# Patient Record
Sex: MALE | Race: WHITE | NOT HISPANIC OR LATINO | Employment: OTHER | URBAN - METROPOLITAN AREA
[De-identification: names, ages, dates, MRNs, and addresses within clinical notes are randomized per-mention and may not be internally consistent; named-entity substitution may affect disease eponyms.]

---

## 2021-10-26 ENCOUNTER — TRANSCRIBE ORDERS (OUTPATIENT)
Dept: ADMINISTRATIVE | Facility: HOSPITAL | Age: 71
End: 2021-10-26

## 2021-10-26 ENCOUNTER — LAB (OUTPATIENT)
Dept: LAB | Facility: HOSPITAL | Age: 71
End: 2021-10-26

## 2021-10-26 DIAGNOSIS — E87.5 SERUM POTASSIUM ELEVATED: Primary | ICD-10-CM

## 2021-10-26 DIAGNOSIS — E87.5 SERUM POTASSIUM ELEVATED: ICD-10-CM

## 2021-10-26 LAB
ALBUMIN SERPL-MCNC: 4.3 G/DL (ref 3.5–5.2)
ALBUMIN/GLOB SERPL: 1.8 G/DL
ALP SERPL-CCNC: 101 U/L (ref 39–117)
ALT SERPL W P-5'-P-CCNC: 25 U/L (ref 1–41)
ANION GAP SERPL CALCULATED.3IONS-SCNC: 7.1 MMOL/L (ref 5–15)
AST SERPL-CCNC: 22 U/L (ref 1–40)
BILIRUB SERPL-MCNC: 0.3 MG/DL (ref 0–1.2)
BUN SERPL-MCNC: 14 MG/DL (ref 8–23)
BUN/CREAT SERPL: 12.3 (ref 7–25)
CALCIUM SPEC-SCNC: 9.8 MG/DL (ref 8.6–10.5)
CHLORIDE SERPL-SCNC: 102 MMOL/L (ref 98–107)
CO2 SERPL-SCNC: 28.9 MMOL/L (ref 22–29)
CREAT SERPL-MCNC: 1.14 MG/DL (ref 0.76–1.27)
GFR SERPL CREATININE-BSD FRML MDRD: 63 ML/MIN/1.73
GLOBULIN UR ELPH-MCNC: 2.4 GM/DL
GLUCOSE SERPL-MCNC: 80 MG/DL (ref 65–99)
POTASSIUM SERPL-SCNC: 4.7 MMOL/L (ref 3.5–5.2)
PROT SERPL-MCNC: 6.7 G/DL (ref 6–8.5)
SODIUM SERPL-SCNC: 138 MMOL/L (ref 136–145)

## 2021-10-26 PROCEDURE — 36415 COLL VENOUS BLD VENIPUNCTURE: CPT

## 2021-10-26 PROCEDURE — 80053 COMPREHEN METABOLIC PANEL: CPT

## 2022-01-14 ENCOUNTER — TRANSCRIBE ORDERS (OUTPATIENT)
Dept: ADMINISTRATIVE | Facility: HOSPITAL | Age: 72
End: 2022-01-14

## 2022-01-14 DIAGNOSIS — C61 PROSTATE CANCER: Primary | ICD-10-CM

## 2022-02-23 ENCOUNTER — HOSPITAL ENCOUNTER (OUTPATIENT)
Dept: MRI IMAGING | Facility: HOSPITAL | Age: 72
Discharge: HOME | End: 2022-02-23
Admitting: STUDENT IN AN ORGANIZED HEALTH CARE EDUCATION/TRAINING PROGRAM

## 2022-02-23 DIAGNOSIS — C61 PROSTATE CANCER: ICD-10-CM

## 2022-02-23 LAB — CREAT BLDA-MCNC: 1.2 MG/DL (ref 0.6–1.3)

## 2022-02-23 PROCEDURE — 82565 ASSAY OF CREATININE: CPT

## 2022-02-23 PROCEDURE — 0 GADOBENATE DIMEGLUMINE 529 MG/ML SOLUTION: Performed by: STUDENT IN AN ORGANIZED HEALTH CARE EDUCATION/TRAINING PROGRAM

## 2022-02-23 PROCEDURE — A9577 INJ MULTIHANCE: HCPCS | Performed by: STUDENT IN AN ORGANIZED HEALTH CARE EDUCATION/TRAINING PROGRAM

## 2022-02-23 PROCEDURE — 72197 MRI PELVIS W/O & W/DYE: CPT

## 2022-02-23 RX ADMIN — GADOBENATE DIMEGLUMINE 16 ML: 529 INJECTION, SOLUTION INTRAVENOUS at 07:11

## 2023-05-09 ENCOUNTER — TRANSCRIBE ORDERS (OUTPATIENT)
Dept: ADMINISTRATIVE | Facility: HOSPITAL | Age: 73
End: 2023-05-09
Payer: MEDICARE

## 2023-05-09 DIAGNOSIS — R74.8 ELEVATED LIVER ENZYMES: Primary | ICD-10-CM

## 2023-05-11 ENCOUNTER — HOSPITAL ENCOUNTER (OUTPATIENT)
Dept: ULTRASOUND IMAGING | Facility: HOSPITAL | Age: 73
Discharge: HOME | End: 2023-05-11
Admitting: NURSE PRACTITIONER
Payer: MEDICARE

## 2023-05-11 DIAGNOSIS — R74.8 ELEVATED LIVER ENZYMES: ICD-10-CM

## 2023-05-11 PROCEDURE — 76705 ECHO EXAM OF ABDOMEN: CPT

## 2023-05-12 ENCOUNTER — TRANSCRIBE ORDERS (OUTPATIENT)
Dept: ADMINISTRATIVE | Facility: HOSPITAL | Age: 73
End: 2023-05-12
Payer: MEDICARE

## 2023-05-12 ENCOUNTER — HOSPITAL ENCOUNTER (OUTPATIENT)
Dept: CT IMAGING | Facility: HOSPITAL | Age: 73
Discharge: HOME | End: 2023-05-12
Payer: MEDICARE

## 2023-05-12 DIAGNOSIS — Z80.42 FAMILY HX OF PROSTATE CANCER: ICD-10-CM

## 2023-05-12 DIAGNOSIS — K76.9 LIVER LESION: Primary | ICD-10-CM

## 2023-05-12 DIAGNOSIS — R93.5 ABNORMAL ULTRASOUND OF ABDOMEN: ICD-10-CM

## 2023-05-12 DIAGNOSIS — K76.9 LIVER LESION: ICD-10-CM

## 2023-05-12 PROCEDURE — 74176 CT ABD & PELVIS W/O CONTRAST: CPT

## 2023-05-12 PROCEDURE — 71250 CT THORAX DX C-: CPT

## 2023-05-15 ENCOUNTER — LAB (OUTPATIENT)
Dept: LAB | Facility: HOSPITAL | Age: 73
End: 2023-05-15
Payer: MEDICARE

## 2023-05-15 ENCOUNTER — TELEPHONE (OUTPATIENT)
Dept: ONCOLOGY | Facility: CLINIC | Age: 73
End: 2023-05-15

## 2023-05-15 ENCOUNTER — CONSULT (OUTPATIENT)
Dept: ONCOLOGY | Facility: CLINIC | Age: 73
End: 2023-05-15
Payer: MEDICARE

## 2023-05-15 VITALS
SYSTOLIC BLOOD PRESSURE: 103 MMHG | WEIGHT: 147.6 LBS | OXYGEN SATURATION: 98 % | HEIGHT: 72 IN | TEMPERATURE: 98.1 F | BODY MASS INDEX: 19.99 KG/M2 | DIASTOLIC BLOOD PRESSURE: 69 MMHG | HEART RATE: 92 BPM

## 2023-05-15 DIAGNOSIS — Z86.002 PERSONAL HISTORY OF IN-SITU NEOPLASM OF OTHER AND UNSPECIFIED GENITAL ORGANS: ICD-10-CM

## 2023-05-15 DIAGNOSIS — C79.9 METASTATIC MALIGNANT NEOPLASM, UNSPECIFIED SITE: ICD-10-CM

## 2023-05-15 DIAGNOSIS — C61 PROSTATE CANCER: ICD-10-CM

## 2023-05-15 DIAGNOSIS — K76.9 LIVER LESION: Primary | ICD-10-CM

## 2023-05-15 DIAGNOSIS — G89.3 PAIN OF METASTATIC MALIGNANCY: ICD-10-CM

## 2023-05-15 LAB
ALBUMIN SERPL-MCNC: 3.8 G/DL (ref 3.5–5.2)
ALBUMIN/GLOB SERPL: 1.4 G/DL
ALP SERPL-CCNC: 275 U/L (ref 39–117)
ALT SERPL W P-5'-P-CCNC: 33 U/L (ref 1–41)
ANION GAP SERPL CALCULATED.3IONS-SCNC: 10 MMOL/L (ref 5–15)
APTT PPP: 32 SECONDS (ref 24–31)
AST SERPL-CCNC: 27 U/L (ref 1–40)
BASOPHILS # BLD AUTO: 0.05 10*3/MM3 (ref 0–0.2)
BASOPHILS NFR BLD AUTO: 0.5 % (ref 0–1.5)
BILIRUB SERPL-MCNC: 0.5 MG/DL (ref 0–1.2)
BUN SERPL-MCNC: 20 MG/DL (ref 8–23)
BUN/CREAT SERPL: 16.9 (ref 7–25)
CALCIUM SPEC-SCNC: 10 MG/DL (ref 8.6–10.5)
CHLORIDE SERPL-SCNC: 91 MMOL/L (ref 98–107)
CO2 SERPL-SCNC: 28 MMOL/L (ref 22–29)
CREAT SERPL-MCNC: 1.18 MG/DL (ref 0.76–1.27)
DEPRECATED RDW RBC AUTO: 40.7 FL (ref 37–54)
EGFRCR SERPLBLD CKD-EPI 2021: 65.6 ML/MIN/1.73
EOSINOPHIL # BLD AUTO: 0.15 10*3/MM3 (ref 0–0.4)
EOSINOPHIL NFR BLD AUTO: 1.5 % (ref 0.3–6.2)
ERYTHROCYTE [DISTWIDTH] IN BLOOD BY AUTOMATED COUNT: 13.1 % (ref 12.3–15.4)
GLOBULIN UR ELPH-MCNC: 2.8 GM/DL
GLUCOSE SERPL-MCNC: 102 MG/DL (ref 65–99)
HCT VFR BLD AUTO: 38.9 % (ref 37.5–51)
HGB BLD-MCNC: 13.1 G/DL (ref 13–17.7)
INR PPP: 1.01 (ref 0.93–1.1)
LYMPHOCYTES # BLD AUTO: 1.03 10*3/MM3 (ref 0.7–3.1)
LYMPHOCYTES NFR BLD AUTO: 10.4 % (ref 19.6–45.3)
MCH RBC QN AUTO: 29.4 PG (ref 26.6–33)
MCHC RBC AUTO-ENTMCNC: 33.7 G/DL (ref 31.5–35.7)
MCV RBC AUTO: 87.2 FL (ref 79–97)
MONOCYTES # BLD AUTO: 1.26 10*3/MM3 (ref 0.1–0.9)
MONOCYTES NFR BLD AUTO: 12.7 % (ref 5–12)
NEUTROPHILS NFR BLD AUTO: 7.46 10*3/MM3 (ref 1.7–7)
NEUTROPHILS NFR BLD AUTO: 74.9 % (ref 42.7–76)
PLATELET # BLD AUTO: 320 10*3/MM3 (ref 140–450)
PMV BLD AUTO: 9.8 FL (ref 6–12)
POTASSIUM SERPL-SCNC: 4.8 MMOL/L (ref 3.5–5.2)
PROT SERPL-MCNC: 6.6 G/DL (ref 6–8.5)
PROTHROMBIN TIME: 10.8 SECONDS (ref 9.6–11.7)
RBC # BLD AUTO: 4.46 10*6/MM3 (ref 4.14–5.8)
SODIUM SERPL-SCNC: 129 MMOL/L (ref 136–145)
WBC NRBC COR # BLD: 9.95 10*3/MM3 (ref 3.4–10.8)

## 2023-05-15 PROCEDURE — 85025 COMPLETE CBC W/AUTO DIFF WBC: CPT

## 2023-05-15 PROCEDURE — 85610 PROTHROMBIN TIME: CPT | Performed by: INTERNAL MEDICINE

## 2023-05-15 PROCEDURE — 36415 COLL VENOUS BLD VENIPUNCTURE: CPT | Performed by: INTERNAL MEDICINE

## 2023-05-15 PROCEDURE — 84153 ASSAY OF PSA TOTAL: CPT | Performed by: INTERNAL MEDICINE

## 2023-05-15 PROCEDURE — 80053 COMPREHEN METABOLIC PANEL: CPT | Performed by: INTERNAL MEDICINE

## 2023-05-15 PROCEDURE — 82378 CARCINOEMBRYONIC ANTIGEN: CPT | Performed by: INTERNAL MEDICINE

## 2023-05-15 PROCEDURE — 85730 THROMBOPLASTIN TIME PARTIAL: CPT | Performed by: INTERNAL MEDICINE

## 2023-05-15 RX ORDER — METOPROLOL TARTRATE 50 MG/1
50 TABLET, FILM COATED ORAL 2 TIMES DAILY WITH MEALS
Status: ON HOLD | COMMUNITY
Start: 2023-03-21

## 2023-05-15 RX ORDER — OXYCODONE HYDROCHLORIDE 5 MG/1
5 TABLET ORAL EVERY 6 HOURS PRN
Qty: 120 TABLET | Refills: 0 | Status: ON HOLD | OUTPATIENT
Start: 2023-05-15

## 2023-05-15 RX ORDER — LISINOPRIL 40 MG/1
1 TABLET ORAL DAILY
Status: ON HOLD | COMMUNITY
Start: 2023-01-30

## 2023-05-15 RX ORDER — HYDROCODONE BITARTRATE AND ACETAMINOPHEN 5; 325 MG/1; MG/1
1 TABLET ORAL EVERY 6 HOURS PRN
COMMUNITY
End: 2023-05-22

## 2023-05-15 RX ORDER — MIRTAZAPINE 7.5 MG/1
7.5 TABLET, FILM COATED ORAL NIGHTLY
Status: ON HOLD | COMMUNITY
Start: 2023-05-08

## 2023-05-15 NOTE — TELEPHONE ENCOUNTER
Caller: LEIDY    Relationship: Other - [facility]    Best call back number: [phone]    What is the best time to reach you: ANYTIME    Who are you requesting to speak with (clinical staff, provider, specific staff member): CLINICAL TEAM    What was the call regarding: LEIDY STATED HE RECVD THE PRESCRIPTION FOR PTS OXYCODONE BUT PT JUST PICKED UP A SCRIPT FOR HYDROCODONE 5-325 (60 TABLETS) - PLEASE CALL ASAP TO ADVISE IF THE OXYCODONE SHOULD BE DISPENSED ON TOP OF THE HYDROCODONE OR NOT.     Do you require a callback: YES

## 2023-05-15 NOTE — TELEPHONE ENCOUNTER
PER DR. MOORE, HE WANTS THE OXYCODONE DISPENSED AS HE IS ELIMINATING THE ACETAMINOPHEN.  GAVE APPROVAL TO PHARMACIST BALTAZAR AT [facility].  SHE WILL ALSO EDUCATE PATIENT TO STOP TAKING THE HYDROCODONE, ONLY USE THE OXYCODONE.

## 2023-05-15 NOTE — PROGRESS NOTES
HEMATOLOGY ONCOLOGY OUTPATIENT CONSULTATION       Patient name: Cali Cavazos  : 1950  MRN: [id number]  Primary Care Physician: William Crawford MD  Referring Physician: No ref. provider found  Reason For Consult: Metastatic malignancy. History of prostate cancer.     History of Present Illness:  Patient is a 72 y.o.     5/15/2023: Mr. Cavazos was referred for the investigation and treatment of what appeared to be a metastatic malignancy. His symptoms had started at the end of  or the beginning of . He first noted a loss of appetite. More important, however, he had dysgeusia. This led slowly to a significant reduction in the amount he ate and consequently he had a drop in his weight. Slowly he developed low back pain that became progressively more intense. His dysgeusia became more severe. He had imaging of the abdomen that reported findings consistent with metastatic disease involving lymph nodes in the ben hepatis, mid abdominal mesentery and the mid abdominal retroperitoneum. Some suggestion of peritoneal carcinomatosis was also present. But more important, there were several nodules in the liver, the largest of which was about 1.8 cm. No obvious source was present, though a non-specific lesion measuring 1.5 cm was present in the mid left kidney. Also a change in caliber of the colon near the level of the rectum. The exam revealed him to be a slender man in no distress. No jaundice and not ill or in distress. No jaundice. No oral lesions and no palpable adenopathy. Lungs diminished. The liver appeared enlarged to palpation of the abdomen; at the the level of the mid clavicular line it seemed to be approximately 5 cm below the costal margin and it was hard and nodular. Family history was not contributory to the present illness. A decision was made to obtain biopsy of the liver. Several laboratory exams were requested. To see me in 2 weeks.     Subjective:  5/15/2023:  In the office for the first time with the above.     Past Medical History:   Diagnosis Date   • Hypertension    • Prostate cancer      Past Surgical History:   Procedure Laterality Date   • BACK SURGERY      two back surgeries    • COLONOSCOPY         Current Outpatient Medications:   •  HYDROcodone-acetaminophen (NORCO) 5-325 MG per tablet, Take 1 tablet by mouth Every 6 (Six) Hours As Needed., Disp: , Rfl:   •  lisinopril (PRINIVIL,ZESTRIL) 40 MG tablet, Take 1 tablet by mouth Daily., Disp: , Rfl:   •  metoprolol tartrate (LOPRESSOR) 50 MG tablet, Take 1 tablet by mouth 2 (Two) Times a Day With Meals., Disp: , Rfl:   •  mirtazapine (REMERON) 7.5 MG tablet, , Disp: , Rfl:   •  oxyCODONE (Roxicodone) 5 MG immediate release tablet, Take 1 tablet by mouth Every 6 (Six) Hours As Needed for Moderate Pain., Disp: 120 tablet, Rfl: 0    Allergies   Allergen Reactions   • Penicillins Hives     Family History   Problem Relation Age of Onset   • Stroke Mother    • Stomach cancer Father 74   • Throat cancer Sister    • Cancer Brother         Unclear origin. Bladder?   • Prostate cancer Brother 65     Cancer-related family history includes Cancer in his brother; Prostate cancer (age of onset: 65) in his brother; Stomach cancer (age of onset: 74) in his father; Throat cancer in his sister.    Social History     Tobacco Use   • Smoking status: Former     Packs/day: 1.00     Years: 50.00     Pack years: 50.00     Types: Cigarettes     Start date:      Quit date: 2022     Years since quittin.0   • Smokeless tobacco: Never   Vaping Use   • Vaping Use: Never used   Substance Use Topics   • Alcohol use: Not Currently   • Drug use: Never     Social History     Social History Narrative   • Not on file     ROS:   Review of Systems   Constitutional: Positive for activity change, appetite change, fatigue and unexpected weight change. Negative for chills, diaphoresis and fever.   HENT: Negative for congestion, dental  "problem, drooling, ear discharge, ear pain, facial swelling, hearing loss, mouth sores, nosebleeds, postnasal drip, rhinorrhea, sinus pressure, sinus pain, sneezing, sore throat, tinnitus, trouble swallowing and voice change.    Eyes: Negative for photophobia, pain, discharge, redness, itching and visual disturbance.   Respiratory: Negative for apnea, cough, choking, chest tightness, shortness of breath, wheezing and stridor.    Cardiovascular: Negative for chest pain, palpitations and leg swelling.   Gastrointestinal: Negative for abdominal distention, abdominal pain, anal bleeding, blood in stool, constipation, diarrhea, nausea, rectal pain and vomiting.        Persistent dysgeusia.    Endocrine: Negative for cold intolerance, heat intolerance, polydipsia and polyuria.   Genitourinary: Negative for decreased urine volume, difficulty urinating, dysuria, flank pain, frequency, genital sores, hematuria and urgency.   Musculoskeletal: Positive for back pain. Negative for arthralgias, gait problem, joint swelling, myalgias, neck pain and neck stiffness.   Skin: Negative for color change, pallor and rash.   Neurological: Negative for dizziness, tremors, seizures, syncope, facial asymmetry, speech difficulty, weakness, light-headedness, numbness and headaches.   Hematological: Negative for adenopathy. Does not bruise/bleed easily.   Psychiatric/Behavioral: Negative for agitation, behavioral problems, confusion, decreased concentration, hallucinations, self-injury, sleep disturbance and suicidal ideas. The patient is not nervous/anxious.        Objective:    Vital Signs:  Vitals:    05/15/23 1448   BP: 103/69   Pulse: 92   Temp: 98.1 °F (36.7 °C)   TempSrc: Oral   SpO2: 98%   Weight: 67 kg (147 lb 9.6 oz)   Height: 182.9 cm (72\")   PainSc: 0-No pain     Body mass index is 20.02 kg/m².    ECOG  (1) Restricted in physically strenuous activity, ambulatory and able to do work of light nature    Physical Exam:   Physical " Exam  Constitutional:       General: He is not in acute distress.     Appearance: He is not ill-appearing, toxic-appearing or diaphoretic.      Comments: Slender man. Appears ill. Conversant and in good spirits. No distress.    HENT:      Head: Normocephalic and atraumatic.      Right Ear: External ear normal.      Left Ear: External ear normal.      Nose: Nose normal.      Mouth/Throat:      Mouth: Mucous membranes are moist.      Pharynx: Oropharynx is clear.   Eyes:      General: No scleral icterus.        Right eye: No discharge.         Left eye: No discharge.      Conjunctiva/sclera: Conjunctivae normal.      Pupils: Pupils are equal, round, and reactive to light.   Cardiovascular:      Rate and Rhythm: Normal rate and regular rhythm.      Pulses: Normal pulses.      Heart sounds: Normal heart sounds. No murmur heard.    No friction rub. No gallop.   Pulmonary:      Effort: No respiratory distress.      Breath sounds: No stridor. No wheezing, rhonchi or rales.   Chest:      Chest wall: No tenderness.   Abdominal:      General: Abdomen is flat. Bowel sounds are normal. There is no distension.      Palpations: Abdomen is soft. There is no mass.      Tenderness: There is no abdominal tenderness. There is no right CVA tenderness, left CVA tenderness, guarding or rebound.   Musculoskeletal:         General: No swelling, tenderness, deformity or signs of injury.      Cervical back: No rigidity.      Right lower leg: No edema.      Left lower leg: No edema.   Lymphadenopathy:      Cervical: No cervical adenopathy.   Skin:     General: Skin is warm and dry.      Coloration: Skin is not jaundiced.      Findings: No bruising or rash.   Neurological:      General: No focal deficit present.      Mental Status: He is alert and oriented to person, place, and time.      Gait: Gait normal.   Psychiatric:         Mood and Affect: Mood normal.         Behavior: Behavior normal.         Thought Content: Thought content normal.          Judgment: Judgment normal.     I Jonathan Jacob MD performed the physical exam on 5/15/2023 as documented above.     Lab Results - Last 18 Months   Lab Units 05/15/23  1437   WBC 10*3/mm3 9.95   HEMOGLOBIN g/dL 13.1   HEMATOCRIT % 38.9   PLATELETS 10*3/mm3 320   MCV fL 87.2     Lab Results - Last 18 Months   Lab Units 02/23/22  0637   CREATININE mg/dL 1.20     Lab Results   Component Value Date    GLUCOSE 80 10/26/2021    BUN 14 10/26/2021    CREATININE 1.20 02/23/2022    EGFRIFNONA 63 10/26/2021    BCR 12.3 10/26/2021    K 4.7 10/26/2021    CO2 28.9 10/26/2021    CALCIUM 9.8 10/26/2021    ALBUMIN 4.30 10/26/2021    AST 22 10/26/2021    ALT 25 10/26/2021     Assessment & Plan     1. Apparent metastatic malignancy. I independently reviewed the images of the scans of the chest, abdomen and pelvis. Reviewed the reports and all the records available. Discussed with him and his family. I strongly suspect metastatic malignancy. It is unlikely to be of prostate origin, both because of the pattern of disease but also because, reportedly, when the symptoms began, his PSA was very low. If indeed he has a malignancy, it would very likely be of gastrointestinal symptoms. Discussed with him and his family the findings and my impression. I have requested a biopsy of one of the liver lesions. I have also requested laboratory exams. Discussed with him the plans.   2. Reviewed all records available.   3. He is to return in 2 weeks with new results.     Jonathan Jacob MD on 5/15/2023 at 16:47

## 2023-05-16 LAB
CEA SERPL-MCNC: 25.1 NG/ML
PSA SERPL-MCNC: 0.39 NG/ML (ref 0–4)

## 2023-05-17 ENCOUNTER — TELEPHONE (OUTPATIENT)
Dept: ONCOLOGY | Facility: CLINIC | Age: 73
End: 2023-05-17
Payer: MEDICARE

## 2023-05-17 DIAGNOSIS — C61 PROSTATE CANCER: ICD-10-CM

## 2023-05-17 DIAGNOSIS — C79.9 METASTATIC MALIGNANT NEOPLASM, UNSPECIFIED SITE: Primary | ICD-10-CM

## 2023-05-17 NOTE — TELEPHONE ENCOUNTER
SPOKE WITH PATIENT'S WIFE TO GIVE INFORMATION REGARDING STAT CT OF AB/PELVIS SCHEDULED FOR 05/18 @ 9AM.  INFORMED HER TO BE HERE AT 8:45AM, AND LET HER KNOW PATIENT IS NOT TO HAVE ANYTHING TO EAT OR DRINK AFTER 5AM EXCEPT PLAIN WATER.  ALSO, OKAY TO TAKE MORNING MEDICATIONS.

## 2023-05-18 ENCOUNTER — HOSPITAL ENCOUNTER (OUTPATIENT)
Dept: PET IMAGING | Facility: HOSPITAL | Age: 73
Discharge: HOME | End: 2023-05-18
Payer: MEDICARE

## 2023-05-18 DIAGNOSIS — C61 PROSTATE CANCER: ICD-10-CM

## 2023-05-18 DIAGNOSIS — C79.9 METASTATIC MALIGNANT NEOPLASM, UNSPECIFIED SITE: ICD-10-CM

## 2023-05-18 PROCEDURE — 74177 CT ABD & PELVIS W/CONTRAST: CPT

## 2023-05-18 PROCEDURE — 25510000001 IOPAMIDOL PER 1 ML: Performed by: INTERNAL MEDICINE

## 2023-05-18 PROCEDURE — 71260 CT THORAX DX C+: CPT

## 2023-05-18 RX ADMIN — IOPAMIDOL 100 ML: 755 INJECTION, SOLUTION INTRAVENOUS at 10:11

## 2023-05-21 ENCOUNTER — HOSPITAL ENCOUNTER (INPATIENT)
Facility: HOSPITAL | Age: 73
LOS: 3 days | Discharge: HOME | End: 2023-05-26
Attending: EMERGENCY MEDICINE | Admitting: INTERNAL MEDICINE
Payer: MEDICARE

## 2023-05-21 DIAGNOSIS — C61 PROSTATE CANCER: ICD-10-CM

## 2023-05-21 DIAGNOSIS — C78.6 PERITONEAL CARCINOMATOSIS: Primary | ICD-10-CM

## 2023-05-21 DIAGNOSIS — K56.609 COLON OBSTRUCTION: ICD-10-CM

## 2023-05-21 DIAGNOSIS — R93.5 ABNORMAL CT OF THE ABDOMEN: ICD-10-CM

## 2023-05-21 DIAGNOSIS — K76.9 LIVER LESION: ICD-10-CM

## 2023-05-21 DIAGNOSIS — E87.1 HYPONATREMIA: ICD-10-CM

## 2023-05-21 DIAGNOSIS — R14.0 ABDOMINAL DISTENSION: ICD-10-CM

## 2023-05-21 DIAGNOSIS — R10.9 ABDOMINAL PAIN, UNSPECIFIED ABDOMINAL LOCATION: ICD-10-CM

## 2023-05-21 DIAGNOSIS — C79.9 METASTATIC MALIGNANT NEOPLASM, UNSPECIFIED SITE: ICD-10-CM

## 2023-05-21 PROCEDURE — 85025 COMPLETE CBC W/AUTO DIFF WBC: CPT | Performed by: EMERGENCY MEDICINE

## 2023-05-21 PROCEDURE — 80053 COMPREHEN METABOLIC PANEL: CPT | Performed by: EMERGENCY MEDICINE

## 2023-05-21 PROCEDURE — 99285 EMERGENCY DEPT VISIT HI MDM: CPT

## 2023-05-21 PROCEDURE — 82140 ASSAY OF AMMONIA: CPT | Performed by: EMERGENCY MEDICINE

## 2023-05-21 PROCEDURE — 85610 PROTHROMBIN TIME: CPT | Performed by: EMERGENCY MEDICINE

## 2023-05-21 RX ORDER — MAGNESIUM CARB/ALUMINUM HYDROX 105-160MG
296 TABLET,CHEWABLE ORAL ONCE
Status: DISCONTINUED | OUTPATIENT
Start: 2023-05-22 | End: 2023-05-22

## 2023-05-21 RX ORDER — SODIUM CHLORIDE 0.9 % (FLUSH) 0.9 %
10 SYRINGE (ML) INJECTION AS NEEDED
Status: DISCONTINUED | OUTPATIENT
Start: 2023-05-21 | End: 2023-05-26 | Stop reason: HOSPADM

## 2023-05-22 ENCOUNTER — INPATIENT HOSPITAL (OUTPATIENT)
Dept: URBAN - METROPOLITAN AREA HOSPITAL 84 | Facility: HOSPITAL | Age: 73
End: 2023-05-22

## 2023-05-22 DIAGNOSIS — C78.7 SECONDARY MALIGNANT NEOPLASM OF LIVER AND INTRAHEPATIC BILE: ICD-10-CM

## 2023-05-22 DIAGNOSIS — R18.8 OTHER ASCITES: ICD-10-CM

## 2023-05-22 DIAGNOSIS — R74.01 ELEVATION OF LEVELS OF LIVER TRANSAMINASE LEVELS: ICD-10-CM

## 2023-05-22 DIAGNOSIS — R43.8 OTHER DISTURBANCES OF SMELL AND TASTE: ICD-10-CM

## 2023-05-22 DIAGNOSIS — K59.00 CONSTIPATION, UNSPECIFIED: ICD-10-CM

## 2023-05-22 DIAGNOSIS — R63.0 ANOREXIA: ICD-10-CM

## 2023-05-22 DIAGNOSIS — C48.1 MALIGNANT NEOPLASM OF SPECIFIED PARTS OF PERITONEUM: ICD-10-CM

## 2023-05-22 DIAGNOSIS — R10.9 UNSPECIFIED ABDOMINAL PAIN: ICD-10-CM

## 2023-05-22 PROBLEM — R93.5 ABNORMAL CT OF THE ABDOMEN: Status: ACTIVE | Noted: 2023-05-21

## 2023-05-22 PROBLEM — E87.1 HYPONATREMIA: Status: ACTIVE | Noted: 2023-05-22

## 2023-05-22 PROBLEM — C78.6 PERITONEAL CARCINOMATOSIS: Status: ACTIVE | Noted: 2023-05-22

## 2023-05-22 LAB
ALBUMIN SERPL-MCNC: 3.7 G/DL (ref 3.5–5.2)
ALBUMIN/GLOB SERPL: 1.3 G/DL
ALP SERPL-CCNC: 299 U/L (ref 39–117)
ALT SERPL W P-5'-P-CCNC: 43 U/L (ref 1–41)
AMMONIA BLD-SCNC: 25 UMOL/L (ref 16–60)
ANION GAP SERPL CALCULATED.3IONS-SCNC: 10 MMOL/L (ref 5–15)
ANION GAP SERPL CALCULATED.3IONS-SCNC: 9 MMOL/L (ref 5–15)
AST SERPL-CCNC: 47 U/L (ref 1–40)
BASOPHILS # BLD AUTO: 0.1 10*3/MM3 (ref 0–0.2)
BASOPHILS # BLD AUTO: 0.1 10*3/MM3 (ref 0–0.2)
BASOPHILS NFR BLD AUTO: 0.9 % (ref 0–1.5)
BASOPHILS NFR BLD AUTO: 0.9 % (ref 0–1.5)
BILIRUB SERPL-MCNC: 0.4 MG/DL (ref 0–1.2)
BUN SERPL-MCNC: 19 MG/DL (ref 8–23)
BUN SERPL-MCNC: 19 MG/DL (ref 8–23)
BUN/CREAT SERPL: 18.6 (ref 7–25)
BUN/CREAT SERPL: 19.2 (ref 7–25)
CALCIUM SPEC-SCNC: 9.5 MG/DL (ref 8.6–10.5)
CALCIUM SPEC-SCNC: 9.9 MG/DL (ref 8.6–10.5)
CHLORIDE SERPL-SCNC: 95 MMOL/L (ref 98–107)
CHLORIDE SERPL-SCNC: 95 MMOL/L (ref 98–107)
CO2 SERPL-SCNC: 24 MMOL/L (ref 22–29)
CO2 SERPL-SCNC: 27 MMOL/L (ref 22–29)
CREAT SERPL-MCNC: 0.99 MG/DL (ref 0.76–1.27)
CREAT SERPL-MCNC: 1.02 MG/DL (ref 0.76–1.27)
DEPRECATED RDW RBC AUTO: 39.4 FL (ref 37–54)
DEPRECATED RDW RBC AUTO: 39.8 FL (ref 37–54)
EGFRCR SERPLBLD CKD-EPI 2021: 78.1 ML/MIN/1.73
EGFRCR SERPLBLD CKD-EPI 2021: 80.9 ML/MIN/1.73
EOSINOPHIL # BLD AUTO: 0.1 10*3/MM3 (ref 0–0.4)
EOSINOPHIL # BLD AUTO: 0.1 10*3/MM3 (ref 0–0.4)
EOSINOPHIL NFR BLD AUTO: 0.6 % (ref 0.3–6.2)
EOSINOPHIL NFR BLD AUTO: 0.8 % (ref 0.3–6.2)
ERYTHROCYTE [DISTWIDTH] IN BLOOD BY AUTOMATED COUNT: 12.9 % (ref 12.3–15.4)
ERYTHROCYTE [DISTWIDTH] IN BLOOD BY AUTOMATED COUNT: 12.9 % (ref 12.3–15.4)
GLOBULIN UR ELPH-MCNC: 2.8 GM/DL
GLUCOSE SERPL-MCNC: 101 MG/DL (ref 65–99)
GLUCOSE SERPL-MCNC: 117 MG/DL (ref 65–99)
HCT VFR BLD AUTO: 36.5 % (ref 37.5–51)
HCT VFR BLD AUTO: 36.6 % (ref 37.5–51)
HGB BLD-MCNC: 11.9 G/DL (ref 13–17.7)
HGB BLD-MCNC: 12.1 G/DL (ref 13–17.7)
HOLD SPECIMEN: NORMAL
INR PPP: 1.04 (ref 0.93–1.1)
LYMPHOCYTES # BLD AUTO: 0.7 10*3/MM3 (ref 0.7–3.1)
LYMPHOCYTES # BLD AUTO: 0.9 10*3/MM3 (ref 0.7–3.1)
LYMPHOCYTES NFR BLD AUTO: 11 % (ref 19.6–45.3)
LYMPHOCYTES NFR BLD AUTO: 7.8 % (ref 19.6–45.3)
MAGNESIUM SERPL-MCNC: 1.9 MG/DL (ref 1.6–2.4)
MCH RBC QN AUTO: 29 PG (ref 26.6–33)
MCH RBC QN AUTO: 29 PG (ref 26.6–33)
MCHC RBC AUTO-ENTMCNC: 32.8 G/DL (ref 31.5–35.7)
MCHC RBC AUTO-ENTMCNC: 33.1 G/DL (ref 31.5–35.7)
MCV RBC AUTO: 87.7 FL (ref 79–97)
MCV RBC AUTO: 88.4 FL (ref 79–97)
MONOCYTES # BLD AUTO: 1.3 10*3/MM3 (ref 0.1–0.9)
MONOCYTES # BLD AUTO: 1.3 10*3/MM3 (ref 0.1–0.9)
MONOCYTES NFR BLD AUTO: 14.3 % (ref 5–12)
MONOCYTES NFR BLD AUTO: 15.6 % (ref 5–12)
NEUTROPHILS NFR BLD AUTO: 5.8 10*3/MM3 (ref 1.7–7)
NEUTROPHILS NFR BLD AUTO: 6.9 10*3/MM3 (ref 1.7–7)
NEUTROPHILS NFR BLD AUTO: 71.7 % (ref 42.7–76)
NEUTROPHILS NFR BLD AUTO: 76.4 % (ref 42.7–76)
NRBC BLD AUTO-RTO: 0 /100 WBC (ref 0–0.2)
NRBC BLD AUTO-RTO: 0 /100 WBC (ref 0–0.2)
PHOSPHATE SERPL-MCNC: 3.1 MG/DL (ref 2.5–4.5)
PLATELET # BLD AUTO: 326 10*3/MM3 (ref 140–450)
PLATELET # BLD AUTO: 342 10*3/MM3 (ref 140–450)
PMV BLD AUTO: 7.5 FL (ref 6–12)
PMV BLD AUTO: 7.7 FL (ref 6–12)
POTASSIUM SERPL-SCNC: 4.8 MMOL/L (ref 3.5–5.2)
POTASSIUM SERPL-SCNC: 4.9 MMOL/L (ref 3.5–5.2)
PROT SERPL-MCNC: 6.5 G/DL (ref 6–8.5)
PROTHROMBIN TIME: 11.1 SECONDS (ref 9.6–11.7)
RBC # BLD AUTO: 4.12 10*6/MM3 (ref 4.14–5.8)
RBC # BLD AUTO: 4.17 10*6/MM3 (ref 4.14–5.8)
SODIUM SERPL-SCNC: 129 MMOL/L (ref 136–145)
SODIUM SERPL-SCNC: 131 MMOL/L (ref 136–145)
WBC NRBC COR # BLD: 8.1 10*3/MM3 (ref 3.4–10.8)
WBC NRBC COR # BLD: 9 10*3/MM3 (ref 3.4–10.8)

## 2023-05-22 PROCEDURE — G0378 HOSPITAL OBSERVATION PER HR: HCPCS

## 2023-05-22 PROCEDURE — 84100 ASSAY OF PHOSPHORUS: CPT

## 2023-05-22 PROCEDURE — 83735 ASSAY OF MAGNESIUM: CPT

## 2023-05-22 PROCEDURE — 99214 OFFICE O/P EST MOD 30 MIN: CPT | Performed by: INTERNAL MEDICINE

## 2023-05-22 PROCEDURE — 85025 COMPLETE CBC W/AUTO DIFF WBC: CPT

## 2023-05-22 PROCEDURE — 99222 1ST HOSP IP/OBS MODERATE 55: CPT | Performed by: NURSE PRACTITIONER

## 2023-05-22 PROCEDURE — 92610 EVALUATE SWALLOWING FUNCTION: CPT

## 2023-05-22 PROCEDURE — 80048 BASIC METABOLIC PNL TOTAL CA: CPT

## 2023-05-22 PROCEDURE — 25010000002 HYDROMORPHONE 1 MG/ML SOLUTION: Performed by: NURSE PRACTITIONER

## 2023-05-22 RX ORDER — ALBUMIN (HUMAN) 12.5 G/50ML
62.5 SOLUTION INTRAVENOUS ONCE
Status: DISCONTINUED | OUTPATIENT
Start: 2023-05-22 | End: 2023-05-25

## 2023-05-22 RX ORDER — POLYETHYLENE GLYCOL 3350 17 G/17G
17 POWDER, FOR SOLUTION ORAL DAILY PRN
Status: DISCONTINUED | OUTPATIENT
Start: 2023-05-22 | End: 2023-05-23

## 2023-05-22 RX ORDER — SORBITOL SOLUTION 70 %
50 SOLUTION, ORAL MISCELLANEOUS ONCE
Status: DISCONTINUED | OUTPATIENT
Start: 2023-05-23 | End: 2023-05-26 | Stop reason: HOSPADM

## 2023-05-22 RX ORDER — SODIUM CHLORIDE 0.9 % (FLUSH) 0.9 %
3-10 SYRINGE (ML) INJECTION AS NEEDED
Status: DISCONTINUED | OUTPATIENT
Start: 2023-05-22 | End: 2023-05-22

## 2023-05-22 RX ORDER — DIPHENHYDRAMINE HYDROCHLORIDE AND LIDOCAINE HYDROCHLORIDE AND ALUMINUM HYDROXIDE AND MAGNESIUM HYDRO
5 KIT EVERY 6 HOURS
Status: DISCONTINUED | OUTPATIENT
Start: 2023-05-22 | End: 2023-05-26 | Stop reason: HOSPADM

## 2023-05-22 RX ORDER — ALBUMIN (HUMAN) 12.5 G/50ML
50 SOLUTION INTRAVENOUS ONCE
Status: DISCONTINUED | OUTPATIENT
Start: 2023-05-22 | End: 2023-05-25

## 2023-05-22 RX ORDER — SODIUM CHLORIDE 9 MG/ML
40 INJECTION, SOLUTION INTRAVENOUS AS NEEDED
Status: DISCONTINUED | OUTPATIENT
Start: 2023-05-22 | End: 2023-05-22

## 2023-05-22 RX ORDER — ALBUMIN (HUMAN) 12.5 G/50ML
112.5 SOLUTION INTRAVENOUS ONCE
Status: DISCONTINUED | OUTPATIENT
Start: 2023-05-22 | End: 2023-05-25

## 2023-05-22 RX ORDER — METOPROLOL TARTRATE 50 MG/1
50 TABLET, FILM COATED ORAL 2 TIMES DAILY WITH MEALS
Status: DISCONTINUED | OUTPATIENT
Start: 2023-05-22 | End: 2023-05-26 | Stop reason: HOSPADM

## 2023-05-22 RX ORDER — OXYCODONE HYDROCHLORIDE 5 MG/1
5 TABLET ORAL EVERY 4 HOURS PRN
Status: DISCONTINUED | OUTPATIENT
Start: 2023-05-22 | End: 2023-05-26

## 2023-05-22 RX ORDER — AMOXICILLIN 250 MG
2 CAPSULE ORAL 2 TIMES DAILY
Status: DISCONTINUED | OUTPATIENT
Start: 2023-05-22 | End: 2023-05-23

## 2023-05-22 RX ORDER — BISACODYL 5 MG/1
5 TABLET, DELAYED RELEASE ORAL DAILY PRN
Status: DISCONTINUED | OUTPATIENT
Start: 2023-05-22 | End: 2023-05-23

## 2023-05-22 RX ORDER — SODIUM, POTASSIUM,MAG SULFATES 17.5-3.13G
1 SOLUTION, RECONSTITUTED, ORAL ORAL EVERY 12 HOURS
Status: COMPLETED | OUTPATIENT
Start: 2023-05-22 | End: 2023-05-23

## 2023-05-22 RX ORDER — ONDANSETRON 4 MG/1
4 TABLET, FILM COATED ORAL EVERY 6 HOURS PRN
Status: DISCONTINUED | OUTPATIENT
Start: 2023-05-22 | End: 2023-05-26 | Stop reason: HOSPADM

## 2023-05-22 RX ORDER — HYDROCODONE BITARTRATE AND ACETAMINOPHEN 5; 325 MG/1; MG/1
1 TABLET ORAL EVERY 6 HOURS PRN
Status: DISCONTINUED | OUTPATIENT
Start: 2023-05-22 | End: 2023-05-22

## 2023-05-22 RX ORDER — MIRTAZAPINE 7.5 MG/1
7.5 TABLET, FILM COATED ORAL NIGHTLY
Status: DISCONTINUED | OUTPATIENT
Start: 2023-05-22 | End: 2023-05-26 | Stop reason: HOSPADM

## 2023-05-22 RX ORDER — SODIUM CHLORIDE 9 MG/ML
75 INJECTION, SOLUTION INTRAVENOUS CONTINUOUS
Status: DISCONTINUED | OUTPATIENT
Start: 2023-05-22 | End: 2023-05-26

## 2023-05-22 RX ORDER — ALBUMIN (HUMAN) 12.5 G/50ML
37.5 SOLUTION INTRAVENOUS ONCE
Status: DISCONTINUED | OUTPATIENT
Start: 2023-05-22 | End: 2023-05-25

## 2023-05-22 RX ORDER — ACETAMINOPHEN 325 MG/1
650 TABLET ORAL EVERY 4 HOURS PRN
Status: DISCONTINUED | OUTPATIENT
Start: 2023-05-22 | End: 2023-05-26 | Stop reason: HOSPADM

## 2023-05-22 RX ORDER — BISACODYL 5 MG/1
20 TABLET, DELAYED RELEASE ORAL ONCE
Status: COMPLETED | OUTPATIENT
Start: 2023-05-22 | End: 2023-05-23

## 2023-05-22 RX ORDER — CHOLECALCIFEROL (VITAMIN D3) 125 MCG
5 CAPSULE ORAL NIGHTLY
Status: DISCONTINUED | OUTPATIENT
Start: 2023-05-22 | End: 2023-05-26 | Stop reason: HOSPADM

## 2023-05-22 RX ORDER — NITROGLYCERIN 0.4 MG/1
0.4 TABLET SUBLINGUAL
Status: DISCONTINUED | OUTPATIENT
Start: 2023-05-22 | End: 2023-05-26 | Stop reason: HOSPADM

## 2023-05-22 RX ORDER — SORBITOL SOLUTION 70 %
50 SOLUTION, ORAL MISCELLANEOUS ONCE
Status: COMPLETED | OUTPATIENT
Start: 2023-05-22 | End: 2023-05-22

## 2023-05-22 RX ORDER — SODIUM CHLORIDE 0.9 % (FLUSH) 0.9 %
10 SYRINGE (ML) INJECTION EVERY 12 HOURS SCHEDULED
Status: DISCONTINUED | OUTPATIENT
Start: 2023-05-22 | End: 2023-05-26 | Stop reason: HOSPADM

## 2023-05-22 RX ORDER — ALBUMIN (HUMAN) 12.5 G/50ML
75 SOLUTION INTRAVENOUS ONCE
Status: DISCONTINUED | OUTPATIENT
Start: 2023-05-22 | End: 2023-05-25

## 2023-05-22 RX ORDER — SODIUM CHLORIDE 0.9 % (FLUSH) 0.9 %
3 SYRINGE (ML) INJECTION EVERY 12 HOURS SCHEDULED
Status: DISCONTINUED | OUTPATIENT
Start: 2023-05-22 | End: 2023-05-23 | Stop reason: HOSPADM

## 2023-05-22 RX ORDER — ONDANSETRON 2 MG/ML
4 INJECTION INTRAMUSCULAR; INTRAVENOUS EVERY 6 HOURS PRN
Status: DISCONTINUED | OUTPATIENT
Start: 2023-05-22 | End: 2023-05-26 | Stop reason: HOSPADM

## 2023-05-22 RX ORDER — ALBUMIN (HUMAN) 12.5 G/50ML
87.5 SOLUTION INTRAVENOUS ONCE
Status: DISCONTINUED | OUTPATIENT
Start: 2023-05-22 | End: 2023-05-25

## 2023-05-22 RX ORDER — ALBUMIN (HUMAN) 12.5 G/50ML
100 SOLUTION INTRAVENOUS ONCE
Status: DISCONTINUED | OUTPATIENT
Start: 2023-05-22 | End: 2023-05-25

## 2023-05-22 RX ORDER — FAMOTIDINE 20 MG/1
40 TABLET, FILM COATED ORAL DAILY
Status: DISCONTINUED | OUTPATIENT
Start: 2023-05-22 | End: 2023-05-23

## 2023-05-22 RX ORDER — SODIUM CHLORIDE 9 MG/ML
40 INJECTION, SOLUTION INTRAVENOUS AS NEEDED
Status: DISCONTINUED | OUTPATIENT
Start: 2023-05-22 | End: 2023-05-26 | Stop reason: HOSPADM

## 2023-05-22 RX ORDER — SODIUM CHLORIDE 0.9 % (FLUSH) 0.9 %
10 SYRINGE (ML) INJECTION AS NEEDED
Status: DISCONTINUED | OUTPATIENT
Start: 2023-05-22 | End: 2023-05-22

## 2023-05-22 RX ORDER — LISINOPRIL 20 MG/1
40 TABLET ORAL DAILY
Status: DISCONTINUED | OUTPATIENT
Start: 2023-05-22 | End: 2023-05-26 | Stop reason: HOSPADM

## 2023-05-22 RX ORDER — BISACODYL 10 MG
10 SUPPOSITORY, RECTAL RECTAL DAILY PRN
Status: DISCONTINUED | OUTPATIENT
Start: 2023-05-22 | End: 2023-05-26 | Stop reason: HOSPADM

## 2023-05-22 RX ADMIN — MIRTAZAPINE 7.5 MG: 7.5 TABLET ORAL at 21:27

## 2023-05-22 RX ADMIN — FAMOTIDINE 40 MG: 20 TABLET, FILM COATED ORAL at 05:14

## 2023-05-22 RX ADMIN — OXYCODONE 5 MG: 5 TABLET ORAL at 01:44

## 2023-05-22 RX ADMIN — SENNOSIDES AND DOCUSATE SODIUM 2 TABLET: 50; 8.6 TABLET ORAL at 21:27

## 2023-05-22 RX ADMIN — SENNOSIDES AND DOCUSATE SODIUM 2 TABLET: 50; 8.6 TABLET ORAL at 02:07

## 2023-05-22 RX ADMIN — Medication 10 ML: at 21:27

## 2023-05-22 RX ADMIN — HYDROCODONE BITARTRATE AND ACETAMINOPHEN 1 TABLET: 5; 325 TABLET ORAL at 18:12

## 2023-05-22 RX ADMIN — SORBITOL SOLUTION (BULK) 50 ML: 70 SOLUTION at 00:42

## 2023-05-22 RX ADMIN — DIPHENHYDRAMINE HYDROCHLORIDE AND LIDOCAINE HYDROCHLORIDE AND ALUMINUM HYDROXIDE AND MAGNESIUM HYDRO 5 ML: KIT at 21:27

## 2023-05-22 RX ADMIN — OXYCODONE 5 MG: 5 TABLET ORAL at 17:05

## 2023-05-22 RX ADMIN — SENNOSIDES AND DOCUSATE SODIUM 2 TABLET: 50; 8.6 TABLET ORAL at 08:47

## 2023-05-22 RX ADMIN — SODIUM SULFATE, POTASSIUM SULFATE, MAGNESIUM SULFATE 1 BOTTLE: 1.6; 3.13; 17.5 SOLUTION ORAL at 14:39

## 2023-05-22 RX ADMIN — METOPROLOL TARTRATE 50 MG: 50 TABLET ORAL at 11:52

## 2023-05-22 RX ADMIN — LISINOPRIL 40 MG: 20 TABLET ORAL at 11:52

## 2023-05-22 RX ADMIN — Medication 10 ML: at 01:44

## 2023-05-22 RX ADMIN — OXYCODONE 5 MG: 5 TABLET ORAL at 05:53

## 2023-05-22 RX ADMIN — SODIUM CHLORIDE 75 ML/HR: 9 INJECTION, SOLUTION INTRAVENOUS at 00:41

## 2023-05-22 RX ADMIN — Medication 5 MG: at 21:27

## 2023-05-22 RX ADMIN — Medication 3 ML: at 21:28

## 2023-05-22 RX ADMIN — HYDROMORPHONE HYDROCHLORIDE 1 MG: 1 INJECTION, SOLUTION INTRAMUSCULAR; INTRAVENOUS; SUBCUTANEOUS at 21:36

## 2023-05-22 NOTE — H&P
Patient Care Team:  William Crawford MD as PCP - General (Family Medicine)    Chief complaint constipation    Subjective     Patient is a 72 y.o. male who presents with complaints of constipation, poor appetite, and abdominal discomfort and distention. Patient has recently been diagnosed with metastatic cancer of unknown primary origin. Denies any fever, cough, shortness of breath, chest pain. Family reports a rapid decline at home. He had also had a history of prostate cancer with radiation treatment last year.   In the ER he was given soap suds enema for constipation. It was felt he did not warrant another CT scan given his findings on recent scan 4 days ago.    CT A/P and chest on 5/18 showed: consistent with peritoneal and omental carcinomatosis in the abdomen and pelvis there was some ascites.  There was enlarged mesenteric retroperitoneal and ben hepatitis adenopathy.  There is extensive hepatic metastatic disease.  Chest was without evidence of metastasis.  There was some borderline prominent lymph nodes within the mediastinum.  There was 1.5 cm hyperdense lesion in the lateral margin left mid kidney.  There was an indeterminate 8 mm vague lucent lesion in the L2 vertebral body.  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   HENT: Negative.    Eyes: Negative.    Respiratory: Negative.    Cardiovascular: Negative.    Gastrointestinal: Positive for abdominal distention, abdominal pain and constipation.   Endocrine: Negative.    Genitourinary: Negative.    Musculoskeletal: Negative.    Skin: Negative.    Neurological: Negative.    Psychiatric/Behavioral: Negative.           History  Past Medical History:   Diagnosis Date   • Hypertension    • Prostate cancer      Past Surgical History:   Procedure Laterality Date   • BACK SURGERY      two back  surgeries    • COLONOSCOPY       Family History   Problem Relation Age of Onset   • Stroke Mother    • Stomach cancer Father 74   • Throat cancer Sister    • Cancer Brother         Unclear origin. Bladder?   • Prostate cancer Brother 65     Social History     Tobacco Use   • Smoking status: Former     Packs/day: 1.00     Years: 50.00     Pack years: 50.00     Types: Cigarettes     Start date:      Quit date: 2022     Years since quittin.0   • Smokeless tobacco: Never   Vaping Use   • Vaping Use: Never used   Substance Use Topics   • Alcohol use: Not Currently   • Drug use: Never     (Not in a hospital admission)    Allergies:  Penicillins    Objective     Vital Signs  Temp:  [98.2 °F (36.8 °C)] 98.2 °F (36.8 °C)  Heart Rate:  [62-88] 81  Resp:  [16-22] 20  BP: (101-134)/(60-80) 121/68     Physical Exam:      General Appearance:    Alert, cooperative, in no acute distress   Head:    Normocephalic, without obvious abnormality, atraumatic   Eyes:            Lids and lashes normal, conjunctivae and sclerae normal, no   icterus, no pallor, corneas clear, PERRLA   Ears:    Ears appear intact with no abnormalities noted   Throat:   No oral lesions, no thrush, oral mucosa moist   Neck:   No adenopathy, supple, trachea midline, no thyromegaly, no   carotid bruit, no JVD   Lungs:     Clear to auscultation,respirations regular, even and                  unlabored    Heart:    Regular rhythm and normal rate, normal S1 and S2, no            murmur, no gallop, no rub, no click   Chest Wall:    No abnormalities observed   Abdomen:     Normal bowel sounds, no masses, no organomegaly, soft        Generalized Tenderness present, abdomen distended, no guarding, no rebound tenderness   Extremities:   Moves all extremities well, no edema, no cyanosis, no             redness   Pulses:   Pulses palpable and equal bilaterally   Skin:   No bleeding, bruising or rash   Lymph nodes:   No palpable adenopathy   Neurologic:    No focal deficits noted       Results Review:     Imaging Results (Last 24 Hours)     ** No results found for the last 24 hours. **           Lab Results (last 24 hours)     Procedure Component Value Units Date/Time    Magnesium [[id number]]  (Normal) Collected: 05/22/23 0447    Specimen: Blood Updated: 05/22/23 0512     Magnesium 1.9 mg/dL     Basic Metabolic Panel [[id number]]  (Abnormal) Collected: 05/22/23 0447    Specimen: Blood Updated: 05/22/23 0512     Glucose 117 mg/dL      BUN 19 mg/dL      Creatinine 1.02 mg/dL      Sodium 131 mmol/L      Potassium 4.8 mmol/L      Chloride 95 mmol/L      CO2 27.0 mmol/L      Calcium 9.9 mg/dL      BUN/Creatinine Ratio 18.6     Anion Gap 9.0 mmol/L      eGFR 78.1 mL/min/1.73     Narrative:      GFR Normal >60  Chronic Kidney Disease <60  Kidney Failure <15    The GFR formula is only valid for adults with stable renal function between ages 18 and 70.    Phosphorus [[id number]]  (Normal) Collected: 05/22/23 0447    Specimen: Blood Updated: 05/22/23 0512     Phosphorus 3.1 mg/dL     CBC & Differential [[id number]]  (Abnormal) Collected: 05/22/23 0447    Specimen: Blood Updated: 05/22/23 0506    Narrative:      The following orders were created for panel order CBC & Differential.  Procedure                               Abnormality         Status                     ---------                               -----------         ------                     CBC Auto Differential[[id number]]        Abnormal            Final result                 Please view results for these tests on the individual orders.    CBC Auto Differential [[id number]]  (Abnormal) Collected: 05/22/23 0447    Specimen: Blood Updated: 05/22/23 0506     WBC 9.00 10*3/mm3      RBC 4.12 10*6/mm3      Hemoglobin 11.9 g/dL      Hematocrit 36.5 %      MCV 88.4 fL      MCH 29.0 pg      MCHC 32.8 g/dL      RDW 12.9 %      RDW-SD 39.8 fl      MPV 7.5 fL      Platelets 342 10*3/mm3      Neutrophil % 76.4 %      Lymphocyte %  7.8 %      Monocyte % 14.3 %      Eosinophil % 0.6 %      Basophil % 0.9 %      Neutrophils, Absolute 6.90 10*3/mm3      Lymphocytes, Absolute 0.70 10*3/mm3      Monocytes, Absolute 1.30 10*3/mm3      Eosinophils, Absolute 0.10 10*3/mm3      Basophils, Absolute 0.10 10*3/mm3      nRBC 0.0 /100 WBC     Extra Tubes [[id number]] Collected: 05/21/23 2352    Specimen: Blood, Venous Line Updated: 05/22/23 0100    Narrative:      The following orders were created for panel order Extra Tubes.  Procedure                               Abnormality         Status                     ---------                               -----------         ------                     Gold Top - SST[[id number]]                                   Final result                 Please view results for these tests on the individual orders.    Gold Top - SST [[id number]] Collected: 05/21/23 2352    Specimen: Blood Updated: 05/22/23 0100     Extra Tube Hold for add-ons.     Comment: Auto resulted.       Comprehensive Metabolic Panel [[id number]]  (Abnormal) Collected: 05/21/23 2352    Specimen: Blood Updated: 05/22/23 0032     Glucose 101 mg/dL      BUN 19 mg/dL      Creatinine 0.99 mg/dL      Sodium 129 mmol/L      Potassium 4.9 mmol/L      Comment: Slight hemolysis detected by analyzer. Results may be affected.        Chloride 95 mmol/L      CO2 24.0 mmol/L      Calcium 9.5 mg/dL      Total Protein 6.5 g/dL      Albumin 3.7 g/dL      ALT (SGPT) 43 U/L      AST (SGOT) 47 U/L      Alkaline Phosphatase 299 U/L      Total Bilirubin 0.4 mg/dL      Globulin 2.8 gm/dL      A/G Ratio 1.3 g/dL      BUN/Creatinine Ratio 19.2     Anion Gap 10.0 mmol/L      eGFR 80.9 mL/min/1.73     Narrative:      GFR Normal >60  Chronic Kidney Disease <60  Kidney Failure <15    The GFR formula is only valid for adults with stable renal function between ages 18 and 70.    Ammonia [[id number]]  (Normal) Collected: 05/21/23 2352    Specimen: Blood Updated: 05/22/23 0022     Ammonia 25  umol/L     Protime-INR [[id number]]  (Normal) Collected: 05/21/23 2352    Specimen: Blood Updated: 05/22/23 0014     Protime 11.1 Seconds      INR 1.04    CBC & Differential [[id number]]  (Abnormal) Collected: 05/21/23 2352    Specimen: Blood Updated: 05/22/23 0009    Narrative:      The following orders were created for panel order CBC & Differential.  Procedure                               Abnormality         Status                     ---------                               -----------         ------                     CBC Auto Differential[[id number]]        Abnormal            Final result                 Please view results for these tests on the individual orders.    CBC Auto Differential [[id number]]  (Abnormal) Collected: 05/21/23 2352    Specimen: Blood Updated: 05/22/23 0009     WBC 8.10 10*3/mm3      RBC 4.17 10*6/mm3      Hemoglobin 12.1 g/dL      Hematocrit 36.6 %      MCV 87.7 fL      MCH 29.0 pg      MCHC 33.1 g/dL      RDW 12.9 %      RDW-SD 39.4 fl      MPV 7.7 fL      Platelets 326 10*3/mm3      Neutrophil % 71.7 %      Lymphocyte % 11.0 %      Monocyte % 15.6 %      Eosinophil % 0.8 %      Basophil % 0.9 %      Neutrophils, Absolute 5.80 10*3/mm3      Lymphocytes, Absolute 0.90 10*3/mm3      Monocytes, Absolute 1.30 10*3/mm3      Eosinophils, Absolute 0.10 10*3/mm3      Basophils, Absolute 0.10 10*3/mm3      nRBC 0.0 /100 WBC            I reviewed the patient's new clinical results.    Assessment & Plan       Peritoneal carcinomatosis  -patient was not scanned again, given his findings on CT 4 days ago  -pain control    Hyponatremia  -normal saline infusion    Constipation  -soap suds enema    Hypertension  Prostate Cancer      DVT prophylaxis- SCD's  GI prophylaxis- pepcid    I discussed the patient's findings and my recommendations with patient.     Chani Reyes, APRN  05/22/23  06:03 EDT

## 2023-05-22 NOTE — CONSULTS
GI CONSULT  NOTE:    Referring Provider:  Dr. Nunes    Chief complaint: Abdominal pain, ascites    Subjective .     History of present illness: Cali Cavazos is a 72 y.o. male with history of hypertension, prostate cancer, and back surgery x2 who presents with complaints of constipation and abdominal pain.  The patient reports that he recently underwent outpatient CT which did show metastatic disease in the liver.  He was evaluated by Dr. Jacob on 5/15 and outpatient liver biopsy was ordered, but has not yet been completed.  However, the patient reports that he has had significant worsening in constipation and abdominal pain which prompted his presentation to the ER.  The patient reports that his last bowel movement was approximately 2 weeks ago.  He states that he has been having progressive abdominal distention since that time.  He denies nausea/vomiting, but does acknowledge decreased appetite along with altered taste.  No heartburn or dysphagia.  He denies overt GI bleeding.  Does report recent unintentional weight loss.      Endo History:  2005 colonoscopy (Dr. Ramirez) -polyps ([place], [place])    Past Medical History:  Past Medical History:   Diagnosis Date   • Hypertension    • Prostate cancer        Past Surgical History:  Past Surgical History:   Procedure Laterality Date   • BACK SURGERY      two back surgeries    • COLONOSCOPY         Social History:  Social History     Tobacco Use   • Smoking status: Former     Packs/day: 1.00     Years: 50.00     Pack years: 50.00     Types: Cigarettes     Start date:      Quit date: 2022     Years since quittin.0   • Smokeless tobacco: Never   Vaping Use   • Vaping Use: Never used   Substance Use Topics   • Alcohol use: Not Currently   • Drug use: Never       Family History:  Family History   Problem Relation Age of Onset   • Stroke Mother    • Stomach cancer Father 74   • Throat cancer Sister    • Cancer Brother         Unclear origin. Bladder?    • Prostate cancer Brother 65       Medications:  (Not in a hospital admission)      Scheduled Meds:famotidine, 40 mg, Oral, Daily  senna-docusate sodium, 2 tablet, Oral, BID  sodium chloride, 10 mL, Intravenous, Q12H      Continuous Infusions:sodium chloride, 75 mL/hr, Last Rate: 75 mL/hr (05/22/23 0839)      PRN Meds:.•  acetaminophen  •  senna-docusate sodium **AND** polyethylene glycol **AND** bisacodyl **AND** bisacodyl  •  Magnesium Standard Dose Replacement - Follow Nurse / BPA Driven Protocol  •  nitroglycerin  •  ondansetron **OR** ondansetron  •  oxyCODONE  •  [COMPLETED] Insert Peripheral IV **AND** sodium chloride  •  sodium chloride  •  sodium chloride    ALLERGIES:  Penicillins    ROS:  The following systems were reviewed;   Constitution:  No fevers, no chills, unintentional weight loss  Skin: no rash, no jaundice  Eyes:  No blurry vision, no eye pain  HENT:  No change in hearing or smell  Resp:  No dyspnea or cough  CV:  No chest pain or palpitations  :  No dysuria, hematuria  Musculoskeletal:  No leg cramps or arthralgias  Neuro:  No tremor, no numbness  Psych:  No depression or confusion    Objective     Vital Signs:   Vitals:    05/22/23 0602 05/22/23 0701 05/22/23 0901 05/22/23 0906   BP: 115/68 130/72 124/72    BP Location:       Patient Position:       Pulse: 83 78 77    Resp:       Temp:       TempSrc:       SpO2: 100% 98%  97%   Weight:       Height:           Physical Exam:       General Appearance:    Awake and alert, in no acute distress   Head:    Normocephalic, without obvious abnormality, atraumatic   Throat:   No oral lesions, no thrush, oral mucosa moist   Lungs:     Respirations regular, even and unlabored   Chest Wall:    No abnormalities observed   Abdomen:     Soft, mild generalized tenderness, no rebound or guarding, mild distention   Rectal:     Deferred   Extremities:   Moves all extremities, no edema, no cyanosis   Pulses:   Pulses palpable and equal bilaterally   Skin:   No  rash, no jaundice, normal palpation   Lymph nodes:   No cervical, supraclavicular or submandibular palpable adenopathy   Neurologic:   Cranial nerves 2 - 12 grossly intact, no asterixis       Results Review:   I reviewed the patient's labs and imaging.  CBC    Results from last 7 days   Lab Units 05/22/23  0447 05/21/23  2352 05/15/23  1437   WBC 10*3/mm3 9.00 8.10 9.95   HEMOGLOBIN g/dL 11.9* 12.1* 13.1   PLATELETS 10*3/mm3 342 326 320     CMP   Results from last 7 days   Lab Units 05/22/23  0447 05/21/23  2352 05/15/23  1628   SODIUM mmol/L 131* 129* 129*   POTASSIUM mmol/L 4.8 4.9 4.8   CHLORIDE mmol/L 95* 95* 91*   CO2 mmol/L 27.0 24.0 28.0   BUN mg/dL 19 19 20   CREATININE mg/dL 1.02 0.99 1.18   GLUCOSE mg/dL 117* 101* 102*   ALBUMIN g/dL  --  3.7 3.8   BILIRUBIN mg/dL  --  0.4 0.5   ALK PHOS U/L  --  299* 275*   AST (SGOT) U/L  --  47* 27   ALT (SGPT) U/L  --  43* 33   MAGNESIUM mg/dL 1.9  --   --    PHOSPHORUS mg/dL 3.1  --   --    AMMONIA umol/L  --  25  --      Cr Clearance Estimated Creatinine Clearance: 61.8 mL/min (by C-G formula based on SCr of 1.02 mg/dL).  Coag   Results from last 7 days   Lab Units 05/21/23  2352 05/15/23  1628   INR  1.04 1.01   APTT seconds  --  32.0*     HbA1C No results found for: HGBA1C  Blood Glucose No results found for: POCGLU  Infection     UA      Radiology(recent) No radiology results for the last day       ASSESSMENT:  -Constipation  -Abnormal CT showing peritoneal carcinomatosis, liver metastasis, left renal lesion, small quantity of ascites  -Peritoneal carcinomatosis  -Liver metastasis  -Ascites  -Unintentional weight loss  -Abdominal pain  -Elevated LFTs -suspect due to liver mets  -Mild normocytic anemia  -Anorexia/altered taste  -Hypertension  -History of prostate cancer    PLAN:  Patient is a 72-year-old male with history of hypertension and prostate cancer who was recently diagnosed with metastatic disease of unknown primary on outpatient CT.  Liver biopsy has  been ordered as an outpatient, but not yet performed.  He began having worsening constipation and abdominal pain which prompted his presentation to the ER on 5/21.    CT abdomen/pelvis on admission shows peritoneal carcinomatosis, liver metastasis, left renal lesion, small quantity of ascites.  We will plan paracentesis and send fluid studies.  Suspect malignant ascites due to peritoneal carcinomatosis.  Plan bowel purge to help resolve constipation.  Also plan EGD/colonoscopy tomorrow to assess for possible primary malignancy.  CEA is normal at 25.1  Hemoglobin stable at 11.9.  MCV normal.  Continue to monitor H/H and transfuse as needed.  Elevated LFTs noted.  Suspect due to liver metastasis.  Patient is followed by Dr. Jacob.  Supportive care.      I discussed the patients findings and my recommendations with the patient.  I will discuss case with Dr. Adamson and change the plan accordingly.    We appreciate the referral.    Electronically signed by RALEIGH Saldivar, 05/22/23, 9:52 AM EDT.

## 2023-05-22 NOTE — PLAN OF CARE
72-year-old male presented to the ED with constipation.  He has had poor appetite for over a year (with 30 pound weight loss). He has recently been diagnosed with metastatic cancer of unknown primary.  He reported no cough or shortness of breath.  Family reported he has been declining rapidly.  He has had history of prostate cancer treated with radiation last year from February to May. Paracentesis, EGD and Colonoscopy are planned for tomorrow. SLP dept received orders for swallow evaluation. Pt on full, clear liquid diet w/ NPO at midnight order placed d/t upcoming procedures. Pt awake and lying in bed upon arrival. Daughter present. Pt and dgtr denied hx of aspiration PNA or symptoms of oropharyngeal dysphagia. Only able to trials ice chips/water d/t full, clear liquid diet in place. Timely oral transit and no overt s/s of aspiration noted across limited trials. Pt reports changes to food and liquid tastes over last year.      ST dept to f/u following completion of Paracentesis, EGD and Colonoscopy to trial advanced solids and determine pt's safest and least restrictive diet recommendation.

## 2023-05-22 NOTE — THERAPY EVALUATION
Acute Care - Speech Language Pathology   Swallow Initial Evaluation  Chaitanya     Patient Name: Cali Cavazos  : 1950  MRN: [id number]  Today's Date: 2023               Admit Date: 2023    Visit Dx:     ICD-10-CM ICD-9-CM   1. Peritoneal carcinomatosis  C78.6 197.6   2. Abdominal pain, unspecified abdominal location  R10.9 789.00   3. Hyponatremia  E87.1 276.1   4. Abnormal CT of the abdomen  R93.5 793.6     Patient Active Problem List   Diagnosis   • Peritoneal carcinomatosis   • Abdominal pain   • Hyponatremia   • Abnormal CT of the abdomen     Past Medical History:   Diagnosis Date   • Hypertension    • Prostate cancer      Past Surgical History:   Procedure Laterality Date   • BACK SURGERY      two back surgeries    • COLONOSCOPY         SLP Recommendation and Plan     SLP Diet Recommendation: clear liquid diet, full liquid diet, other (see comments) (d/t upcoming completion of Paracentesis, EGD and Colonoscopy tomorrow.) (23 1600)      Therapy Frequency (Swallow): PRN (23 1600)       SWALLOW EVALUATION (last 72 hours)     SLP Adult Swallow Evaluation     Row Name 23 1600          Document Type evaluation  -KL    Patient Observations cooperative;agree to therapy  -    Patient/Family/Caregiver Comments/Observations daughter present  -    Patient Effort excellent  -          Patient Profile Reviewed yes  -KL    Current Method of Nutrition clear liquids;full liquids  -KL    Prior Level of Function-Swallowing no diet consistency restrictions  -    Plans/Goals Discussed with patient;family  -          Dentition Assessment natural, present and adequate  -KL    Secretion Management WNL/WFL  -KL    Mucosal Quality moist, healthy  -KL    Volitional Swallow WFL  -KL    Volitional Cough WFL  -KL          Oral Motor General Assessment WFL  -KL          Clinical Swallow Evaluation Summary 72-year-old male presented to the ED with constipation.  He has had poor appetite  for over a year (with 30 pound weight loss). He has recently been diagnosed with metastatic cancer of unknown primary.  He reported no cough or shortness of breath.  Family reported he has been declining rapidly.  He has had history of prostate cancer treated with radiation last year from February to May. Paracentesis, EGD and Colonoscopy are planned for tomorrow. SLP dept received orders for swallow evaluation. Pt on full, clear liquid diet w/ NPO at midnight order placed d/t upcoming procedures. Pt awake and lying in bed upon arrival. Daughter present. Pt and dgtr denied hx of aspiration PNA or symptoms of oropharyngeal dysphagia. Only able to trials ice chips/water d/t full, clear liquid diet in place. Timely oral transit and no overt s/s of aspiration noted across limited trials. Pt reports changes to food and liquid tastes over last year.     ST dept to f/u following completion of Paracentesis, EGD and Colonoscopy to trial advanced solids and determine pt's safest and least restrictive diet recommendation.   -KL          Therapy Frequency (Swallow) PRN  -KL    SLP Diet Recommendation clear liquid diet;full liquid diet;other (see comments)  d/t upcoming completion of Paracentesis, EGD and Colonoscopy tomorrow.  -KL    Oral Care Recommendations Oral Care BID/PRN  -KL          Swallow LTGs Patient will demonstrate functional swallow for;Swallow Long Term Goal (free text)  -KL          Diet Texture (Demonstrate functional swallow) regular textures  -KL    Liquid viscosity (Demonstrate functional swallow) thin liquids  -KL    [place] (Demonstrate functional swallow) independently (over 90% accuracy)  -KL    Time Frame (Demonstrate functional swallow) by discharge  -KL    Progress/Outcomes (Demonstrate functional swallow) new goal  -KL          (LTG) Swallow Pt will return to baseline level of diet w/ no observable or reported issues.  -KL    [place] (Swallow Long Term Goal) independently (over 90%  accuracy)  -KL    Progress/Outcomes (Swallow Long Term Goal) new goal  -KL          User Key  (r) = Recorded By, (t) = Taken By, (c) = Cosigned By    Initials Name Effective Dates    Ariela Al 06/16/21 -                 EDUCATION  The patient has been educated in the following areas:   Oral Care/Hydration.        SLP GOALS     Row Name 05/22/23 1600       (LTG) Patient will demonstrate functional swallow for    Diet Texture (Demonstrate functional swallow) regular textures  -KL    Liquid viscosity (Demonstrate functional swallow) thin liquids  -KL    [place] (Demonstrate functional swallow) independently (over 90% accuracy)  -KL    Time Frame (Demonstrate functional swallow) by discharge  -KL    Progress/Outcomes (Demonstrate functional swallow) new goal  -KL       (LTG) Swallow    (LTG) Swallow Pt will return to baseline level of diet w/ no observable or reported issues.  -KL    [place] (Swallow Long Term Goal) independently (over 90% accuracy)  -KL    Progress/Outcomes (Swallow Long Term Goal) new goal  -KL          User Key  (r) = Recorded By, (t) = Taken By, (c) = Cosigned By    Initials Name Provider Type    Ariela Al Speech and Language Pathologist                   Time Calculation:                Ariela Combs  5/22/2023

## 2023-05-22 NOTE — CONSULTS
Hematology/Oncology Inpatient Consultation    Patient name: Cali Cavazos  : 1950  MRN: [id number]  Referring Provider: Dr. Nunes  Reason for Consultation: Metastatic malignancy.     Chief complaint: Severe constipation and progressive loss of his sense of well being.     History of present illness:    Cali Cavazos is a 72 y.o. male who presented to [facility] on 2023 with complaints of     5/15/2023: Mr. Cavazos was referred for the investigation and treatment of what appeared to be a metastatic malignancy. His symptoms had started at the end of  or the beginning of . He first noted a loss of appetite. More important, however, he had dysgeusia. This led slowly to a significant reduction in the amount he ate and consequently he had a drop in his weight. Slowly he developed low back pain that became progressively more intense. His dysgeusia became more severe. He had imaging of the abdomen that reported findings consistent with metastatic disease involving lymph nodes in the ben hepatis, mid abdominal mesentery and the mid abdominal retroperitoneum. Some suggestion of peritoneal carcinomatosis was also present. But more important, there were several nodules in the liver, the largest of which was about 1.8 cm. No obvious source was present, though a non-specific lesion measuring 1.5 cm was present in the mid left kidney. Also a change in caliber of the colon near the level of the rectum. The exam revealed him to be a slender man in no distress. No jaundice and not ill or in distress. No jaundice. No oral lesions and no palpable adenopathy. Lungs diminished. The liver appeared enlarged to palpation of the abdomen; at the the level of the mid clavicular line it seemed to be approximately 5 cm below the costal margin and it was hard and nodular. Family history was not contributory to the present illness. A decision was made to obtain biopsy of the liver.     2023:  Mr. Cavazos came to the emergency department complaining of progressive loss of his sense of well being. Profound anorexia and inability to eat anything were other prominent symptoms. As well, he was having abdominal pain. He was admitted for further investigations.     He/She  has a past medical history of Hypertension and Prostate cancer.    PCP: William Crawford MD    History:  Past Medical History:   Diagnosis Date   • Hypertension    • Prostate cancer    ,   Past Surgical History:   Procedure Laterality Date   • BACK SURGERY      two back surgeries    • COLONOSCOPY     ,   Family History   Problem Relation Age of Onset   • Stroke Mother    • Stomach cancer Father 74   • Throat cancer Sister    • Cancer Brother         Unclear origin. Bladder?   • Prostate cancer Brother 65   ,   Social History     Tobacco Use   • Smoking status: Former     Packs/day: 1.00     Years: 50.00     Pack years: 50.00     Types: Cigarettes     Start date:      Quit date: 2022     Years since quittin.0   • Smokeless tobacco: Never   Vaping Use   • Vaping Use: Never used   Substance Use Topics   • Alcohol use: Not Currently   • Drug use: Never   ,   Medications Prior to Admission   Medication Sig Dispense Refill Last Dose   • lisinopril (PRINIVIL,ZESTRIL) 40 MG tablet Take 1 tablet by mouth Daily.   2023   • metoprolol tartrate (LOPRESSOR) 50 MG tablet Take 1 tablet by mouth 2 (Two) Times a Day With Meals.   2023   • mirtazapine (REMERON) 7.5 MG tablet Take 1 tablet by mouth Every Night.   Past Week   • oxyCODONE (Roxicodone) 5 MG immediate release tablet Take 1 tablet by mouth Every 6 (Six) Hours As Needed for Moderate Pain. 120 tablet 0 2023   , Scheduled Meds:  albumin human, 37.5 g, Intravenous, Once   Or  albumin human, 50 g, Intravenous, Once   Or  albumin human, 62.5 g, Intravenous, Once   Or  albumin human, 75 g, Intravenous, Once   Or  albumin human, 87.5 g, Intravenous, Once   Or  albumin  human, 100 g, Intravenous, Once   Or  albumin human, 112.5 g, Intravenous, Once  bisacodyl, 20 mg, Oral, Once  famotidine, 40 mg, Oral, Daily  lisinopril, 40 mg, Oral, Daily  metoprolol tartrate, 50 mg, Oral, BID With Meals  mirtazapine, 7.5 mg, Oral, Nightly  senna-docusate sodium, 2 tablet, Oral, BID  sodium chloride, 10 mL, Intravenous, Q12H  sodium chloride, 3 mL, Intravenous, Q12H  sodium-potassium-magnesium sulfates, 1 bottle, Oral, Q12H  [START ON 5/23/2023] sorbitol, 50 mL, Oral, Once    , Continuous Infusions:  sodium chloride, 75 mL/hr, Last Rate: 75 mL/hr (05/22/23 1657)    , PRN Meds:  •  acetaminophen  •  senna-docusate sodium **AND** polyethylene glycol **AND** bisacodyl **AND** bisacodyl  •  HYDROcodone-acetaminophen  •  Magnesium Standard Dose Replacement - Follow Nurse / BPA Driven Protocol  •  nitroglycerin  •  ondansetron **OR** ondansetron  •  oxyCODONE  •  [COMPLETED] Insert Peripheral IV **AND** sodium chloride  •  sodium chloride  •  sodium chloride  •  sodium chloride  •  sodium chloride   Allergies:  Penicillins    Subjective     ROS:  Review of Systems   Constitutional: Positive for activity change, appetite change, fatigue and unexpected weight change. Negative for chills, diaphoresis and fever.   HENT: Negative for congestion, dental problem, drooling, ear discharge, ear pain, facial swelling, hearing loss, mouth sores, nosebleeds, postnasal drip, rhinorrhea, sinus pressure, sinus pain, sneezing, sore throat, tinnitus, trouble swallowing and voice change.    Eyes: Negative for photophobia, pain, discharge, redness, itching and visual disturbance.   Respiratory: Negative for apnea, cough, choking, chest tightness, shortness of breath, wheezing and stridor.    Cardiovascular: Negative for chest pain, palpitations and leg swelling.   Gastrointestinal: Positive for abdominal pain, constipation and nausea. Negative for abdominal distention, anal bleeding, blood in stool, diarrhea, rectal pain  "and vomiting.   Endocrine: Negative for cold intolerance, heat intolerance, polydipsia and polyuria.   Genitourinary: Negative for decreased urine volume, difficulty urinating, dysuria, flank pain, frequency, genital sores, hematuria and urgency.   Musculoskeletal: Negative for arthralgias, back pain, gait problem, joint swelling, myalgias, neck pain and neck stiffness.   Skin: Negative for color change, pallor and rash.   Neurological: Negative for dizziness, tremors, seizures, syncope, facial asymmetry, speech difficulty, weakness, light-headedness, numbness and headaches.   Hematological: Negative for adenopathy. Does not bruise/bleed easily.   Psychiatric/Behavioral: Negative for agitation, behavioral problems, confusion, decreased concentration, hallucinations, self-injury, sleep disturbance and suicidal ideas. The patient is not nervous/anxious.       Objective   Vital Signs:   /72   Pulse 76   Temp 98.1 °F (36.7 °C)   Resp 14   Ht 182.9 cm (72\")   Wt 66.7 kg (147 lb 0.8 oz)   SpO2 96%   BMI 19.94 kg/m²     Physical Exam: (performed by MD)  Physical Exam  Constitutional:       General: He is not in acute distress.     Appearance: He is not ill-appearing, toxic-appearing or diaphoretic.      Comments: Pale and chronically ill appearing. Awake, conversant and oriented. No distress.    HENT:      Head: Normocephalic and atraumatic.      Right Ear: External ear normal.      Left Ear: External ear normal.      Nose: Nose normal.      Mouth/Throat:      Mouth: Mucous membranes are moist.      Pharynx: Oropharynx is clear.   Eyes:      General: No scleral icterus.        Right eye: No discharge.         Left eye: No discharge.      Conjunctiva/sclera: Conjunctivae normal.      Pupils: Pupils are equal, round, and reactive to light.   Cardiovascular:      Rate and Rhythm: Normal rate and regular rhythm.      Pulses: Normal pulses.      Heart sounds: Normal heart sounds. No murmur heard.    No friction " rub. No gallop.   Pulmonary:      Effort: No respiratory distress.      Breath sounds: No stridor. No wheezing, rhonchi or rales.   Chest:      Chest wall: No tenderness.   Abdominal:      General: Abdomen is flat. Bowel sounds are normal. There is no distension.      Palpations: Abdomen is soft. There is no mass.      Tenderness: There is no abdominal tenderness. There is no right CVA tenderness, left CVA tenderness, guarding or rebound.      Comments: Full to palpation, especially of the epigastrium. Bowel sounds present.    Musculoskeletal:         General: No swelling, tenderness, deformity or signs of injury.      Cervical back: No rigidity.      Right lower leg: No edema.      Left lower leg: No edema.   Lymphadenopathy:      Cervical: No cervical adenopathy.   Skin:     General: Skin is warm and dry.      Coloration: Skin is not jaundiced.      Findings: No bruising or rash.   Neurological:      General: No focal deficit present.      Mental Status: He is alert and oriented to person, place, and time.      Gait: Gait normal.   Psychiatric:         Mood and Affect: Mood normal.         Behavior: Behavior normal.         Thought Content: Thought content normal.         Judgment: Judgment normal.     ARELIS Jacob MD performed the physical exam on 5/22/2023 as documented above.     Results Review:  Lab Results (last 48 hours)     Procedure Component Value Units Date/Time    Magnesium [[id number]]  (Normal) Collected: 05/22/23 0447    Specimen: Blood Updated: 05/22/23 0512     Magnesium 1.9 mg/dL     Basic Metabolic Panel [[id number]]  (Abnormal) Collected: 05/22/23 0447    Specimen: Blood Updated: 05/22/23 0512     Glucose 117 mg/dL      BUN 19 mg/dL      Creatinine 1.02 mg/dL      Sodium 131 mmol/L      Potassium 4.8 mmol/L      Chloride 95 mmol/L      CO2 27.0 mmol/L      Calcium 9.9 mg/dL      BUN/Creatinine Ratio 18.6     Anion Gap 9.0 mmol/L      eGFR 78.1 mL/min/1.73     Narrative:      GFR Normal  >60  Chronic Kidney Disease <60  Kidney Failure <15    The GFR formula is only valid for adults with stable renal function between ages 18 and 70.    Phosphorus [[id number]]  (Normal) Collected: 05/22/23 0447    Specimen: Blood Updated: 05/22/23 0512     Phosphorus 3.1 mg/dL     CBC & Differential [[id number]]  (Abnormal) Collected: 05/22/23 0447    Specimen: Blood Updated: 05/22/23 0506    Narrative:      The following orders were created for panel order CBC & Differential.  Procedure                               Abnormality         Status                     ---------                               -----------         ------                     CBC Auto Differential[[id number]]        Abnormal            Final result                 Please view results for these tests on the individual orders.    CBC Auto Differential [[id number]]  (Abnormal) Collected: 05/22/23 0447    Specimen: Blood Updated: 05/22/23 0506     WBC 9.00 10*3/mm3      RBC 4.12 10*6/mm3      Hemoglobin 11.9 g/dL      Hematocrit 36.5 %      MCV 88.4 fL      MCH 29.0 pg      MCHC 32.8 g/dL      RDW 12.9 %      RDW-SD 39.8 fl      MPV 7.5 fL      Platelets 342 10*3/mm3      Neutrophil % 76.4 %      Lymphocyte % 7.8 %      Monocyte % 14.3 %      Eosinophil % 0.6 %      Basophil % 0.9 %      Neutrophils, Absolute 6.90 10*3/mm3      Lymphocytes, Absolute 0.70 10*3/mm3      Monocytes, Absolute 1.30 10*3/mm3      Eosinophils, Absolute 0.10 10*3/mm3      Basophils, Absolute 0.10 10*3/mm3      nRBC 0.0 /100 WBC     Extra Tubes [[id number]] Collected: 05/21/23 2352    Specimen: Blood, Venous Line Updated: 05/22/23 0100    Narrative:      The following orders were created for panel order Extra Tubes.  Procedure                               Abnormality         Status                     ---------                               -----------         ------                     [facility][[id number]]                                   Final result                  Please view results for these tests on the individual orders.    [facility] [[id number]] Collected: 05/21/23 2352    Specimen: Blood Updated: 05/22/23 0100     Extra Tube Hold for add-ons.     Comment: Auto resulted.       Comprehensive Metabolic Panel [[id number]]  (Abnormal) Collected: 05/21/23 2352    Specimen: Blood Updated: 05/22/23 0032     Glucose 101 mg/dL      BUN 19 mg/dL      Creatinine 0.99 mg/dL      Sodium 129 mmol/L      Potassium 4.9 mmol/L      Comment: Slight hemolysis detected by analyzer. Results may be affected.        Chloride 95 mmol/L      CO2 24.0 mmol/L      Calcium 9.5 mg/dL      Total Protein 6.5 g/dL      Albumin 3.7 g/dL      ALT (SGPT) 43 U/L      AST (SGOT) 47 U/L      Alkaline Phosphatase 299 U/L      Total Bilirubin 0.4 mg/dL      Globulin 2.8 gm/dL      A/G Ratio 1.3 g/dL      BUN/Creatinine Ratio 19.2     Anion Gap 10.0 mmol/L      eGFR 80.9 mL/min/1.73     Narrative:      GFR Normal >60  Chronic Kidney Disease <60  Kidney Failure <15    The GFR formula is only valid for adults with stable renal function between ages 18 and 70.    Ammonia [[id number]]  (Normal) Collected: 05/21/23 2352    Specimen: Blood Updated: 05/22/23 0022     Ammonia 25 umol/L     Protime-INR [[id number]]  (Normal) Collected: 05/21/23 2352    Specimen: Blood Updated: 05/22/23 0014     Protime 11.1 Seconds      INR 1.04    CBC & Differential [[id number]]  (Abnormal) Collected: 05/21/23 2352    Specimen: Blood Updated: 05/22/23 0009    Narrative:      The following orders were created for panel order CBC & Differential.  Procedure                               Abnormality         Status                     ---------                               -----------         ------                     CBC Auto Differential[[id number]]        Abnormal            Final result                 Please view results for these tests on the individual orders.    CBC Auto Differential [[id number]]  (Abnormal) Collected: 05/21/23  2352    Specimen: Blood Updated: 05/22/23 0009     WBC 8.10 10*3/mm3      RBC 4.17 10*6/mm3      Hemoglobin 12.1 g/dL      Hematocrit 36.6 %      MCV 87.7 fL      MCH 29.0 pg      MCHC 33.1 g/dL      RDW 12.9 %      RDW-SD 39.4 fl      MPV 7.7 fL      Platelets 326 10*3/mm3      Neutrophil % 71.7 %      Lymphocyte % 11.0 %      Monocyte % 15.6 %      Eosinophil % 0.8 %      Basophil % 0.9 %      Neutrophils, Absolute 5.80 10*3/mm3      Lymphocytes, Absolute 0.90 10*3/mm3      Monocytes, Absolute 1.30 10*3/mm3      Eosinophils, Absolute 0.10 10*3/mm3      Basophils, Absolute 0.10 10*3/mm3      nRBC 0.0 /100 WBC        maging Reviewed:   CT Chest With Contrast Diagnostic    Result Date: 5/18/2023  1. The examination was requested to be interpreted on a STAT basis. There are no acute or critical findings on this examination. Overall, the findings are not significantly changed from the study performed 6 days earlier on 5/12/2023. 2. Findings consistent with peritoneal and omental carcinomatosis in the abdomen and pelvis. Small quantity ascites is likely malignant ascites. 3. Pathologically enlarged mesenteric, retroperitoneal and ben hepatis adenopathy within the abdomen, consistent with malignancy. 4. Extensive hepatic metastatic disease. 5. No convincing evidence of metastatic disease within the chest. There is some borderline prominent lymph nodes within the mediastinum and adjacent to the lower SVC, for which attention at surveillance imaging is advised. 6. 1.5 cm hyperdense lesion at the lateral margin left mid kidney. It is difficult to determine whether it demonstrates internal hemorrhagic/proteinaceous components or true enhancement. Renal malignancy cannot be excluded. 7. Indeterminate 8 mm vague lucent lesion in the L2 vertebral body. 8. Stable borderline aneurysmal dilation of the thoracic and abdominal aorta. 9. Mild right hydronephrosis and hydroureter. The right ureter appears to narrow in the lower  pelvis, and may be involved by peritoneal carcinomatosis. 10. Moderate to large colonic stool burden. Electronically Signed: Tiffanie Perezmaira  5/18/2023 10:42 AM EDT  Workstation ID: [id number]    CT Abdomen Pelvis With Contrast    Result Date: 5/18/2023  1. The examination was requested to be interpreted on a STAT basis. There are no acute or critical findings on this examination. Overall, the findings are not significantly changed from the study performed 6 days earlier on 5/12/2023. 2. Findings consistent with peritoneal and omental carcinomatosis in the abdomen and pelvis. Small quantity ascites is likely malignant ascites. 3. Pathologically enlarged mesenteric, retroperitoneal and ben hepatis adenopathy within the abdomen, consistent with malignancy. 4. Extensive hepatic metastatic disease. 5. No convincing evidence of metastatic disease within the chest. There is some borderline prominent lymph nodes within the mediastinum and adjacent to the lower SVC, for which attention at surveillance imaging is advised. 6. 1.5 cm hyperdense lesion at the lateral margin left mid kidney. It is difficult to determine whether it demonstrates internal hemorrhagic/proteinaceous components or true enhancement. Renal malignancy cannot be excluded. 7. Indeterminate 8 mm vague lucent lesion in the L2 vertebral body. 8. Stable borderline aneurysmal dilation of the thoracic and abdominal aorta. 9. Mild right hydronephrosis and hydroureter. The right ureter appears to narrow in the lower pelvis, and may be involved by peritoneal carcinomatosis. 10. Moderate to large colonic stool burden. Electronically Signed: Tiffanieanastasia Perezmaira  5/18/2023 10:42 AM EDT  Workstation ID: [id number]    Assessment & Plan   ASSESSMENT  1. Metastatic malignancy. Likely of colonic origin. Additional investigations have been obtained and he is to have endoscopies tomorrow. The preparation has been requested already. Will follow results. Discussed with him and his  daughter.   2. I doubt there is progression of his prostate cancer. Last PSA was undetectable and this would be a very unusual presentation for prostate cancer.   3. A biopsy of the liver has been arranged. Communicated with interventional radiology for coordination of care.   4. Reviewed all the records and discussed with him and his daughter.     PLAN  1. As above.     Electronically signed by Jonathan Jcaob MD, 05/22/23, 5:43 PM EDT.

## 2023-05-22 NOTE — PLAN OF CARE
Goal Outcome Evaluation:              Outcome Evaluation: Patient pleasant and cooperative. Head-to-toe assessment completed. Paracentesis, EGD and Colonoscopy tomorrow. Family at bedside.

## 2023-05-22 NOTE — ED PROVIDER NOTES
Subjective   History of Present Illness  72-year-old male presents with constipation.  He has had poor appetite.  He has had abdominal distention some discomfort.  He has recently been diagnosed with metastatic cancer of unknown primary.  He reports no cough or shortness of breath.  Family reports he is just been declining rapidly.  He has had history of prostate cancer treated with radiation last year from February to May.  Review of Systems    Past Medical History:   Diagnosis Date   • Hypertension    • Prostate cancer        Allergies   Allergen Reactions   • Penicillins Hives       Past Surgical History:   Procedure Laterality Date   • BACK SURGERY      two back surgeries    • COLONOSCOPY         Family History   Problem Relation Age of Onset   • Stroke Mother    • Stomach cancer Father 74   • Throat cancer Sister    • Cancer Brother         Unclear origin. Bladder?   • Prostate cancer Brother 65       Social History     Socioeconomic History   • Marital status:    Tobacco Use   • Smoking status: Former     Packs/day: 1.00     Years: 50.00     Pack years: 50.00     Types: Cigarettes     Start date:      Quit date: 2022     Years since quittin.0   • Smokeless tobacco: Never   Vaping Use   • Vaping Use: Never used   Substance and Sexual Activity   • Alcohol use: Not Currently   • Drug use: Never   • Sexual activity: Defer     Prior to Admission medications    Medication Sig Start Date End Date Taking? Authorizing Provider   HYDROcodone-acetaminophen (NORCO) 5-325 MG per tablet Take 1 tablet by mouth Every 6 (Six) Hours As Needed.    Wilmar Ventura MD   lisinopril (PRINIVIL,ZESTRIL) 40 MG tablet Take 1 tablet by mouth Daily. 23   Wilmar Ventura MD   metoprolol tartrate (LOPRESSOR) 50 MG tablet Take 1 tablet by mouth 2 (Two) Times a Day With Meals. 3/21/23   Wilmar Ventura MD   mirtazapine (REMERON) 7.5 MG tablet  23   Wilmar Ventura MD   oxyCODONE  (Roxicodone) 5 MG immediate release tablet Take 1 tablet by mouth Every 6 (Six) Hours As Needed for Moderate Pain. 5/15/23   Jonathan Jacob MD           Objective   Physical Exam  72-year-old male awake alert.  Generally thin.  Neck supple chest clear cardiovascular a rhythm abdomen is distended.  He has some mid lower abdominal tenderness.  Rectal exam reveals no blood.  He appears to have some anal stenosis.  Legs without tenderness significant edema.  Procedures           ED Course                                           MDM  Chart review: Patient was noted to have had a CT abdomen pelvis and chest with contrast on the 18th.  Findings were consistent with peritoneal and omental carcinomatosis in the abdomen and pelvis there was some ascites.  There was enlarged mesenteric retroperitoneal and ben hepatitis adenopathy.  There is extensive hepatic metastatic disease.  Chest was without evidence of metastasis.  There was some borderline prominent lymph nodes within the mediastinum.  There was 1.5 cm hyperdense lesion in the lateral margin left mid kidney.  There was an indeterminate 8 mm vague lucent lesion in the L2 vertebral body.  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  Comorbidity: As per past history   Differential: Constipation, fecal impaction, mesenteric carcinoma of unclear primary  My EKG interpretation: Not indicated  Lab: CBC white count 8.1 with hemoglobin 12.1 platelet count 326 INR 1.0, ammonia normal comprehensive metabolic panel sodium 129 ALT 43 AST 47 alk phosphatase 299.  My Radiology review and interpretation: CT scan not felt to be needed in light of scan from the 18th which was reviewed  Discussion/treatment: Patient had IV placed.  With his hyponatremia he was placed on saline was given soapsuds enema.  It was noted that he had recent oncology evaluation.  It is noted that he will need a biopsy to determine origin of his carcinoma he has history of  prostate cancer but this does not fit pattern of prostate cancer.  Findings were discussed with family.  Patient was discussed with the nurse practitioner for Dr. Inman.  He will be placed on her service for bowel cleansing pain control as needed further evaluation of cancer as indicated to try to determine what type of cancer we are dealing with.  Patient was evaluated using appropriate PPE      Final diagnoses:   Peritoneal carcinomatosis   Abdominal pain, unspecified abdominal location   Hyponatremia       ED Disposition  ED Disposition     ED Disposition   Decision to Admit    Condition   --    Comment   --             No follow-up provider specified.       Medication List      No changes were made to your prescriptions during this visit.          Los Montenegro MD  05/22/23 0037       Los Montenegro MD  05/22/23 0042

## 2023-05-23 ENCOUNTER — ANESTHESIA EVENT (OUTPATIENT)
Dept: GASTROENTEROLOGY | Facility: HOSPITAL | Age: 73
End: 2023-05-23
Payer: MEDICARE

## 2023-05-23 ENCOUNTER — ANESTHESIA (OUTPATIENT)
Dept: GASTROENTEROLOGY | Facility: HOSPITAL | Age: 73
End: 2023-05-23
Payer: MEDICARE

## 2023-05-23 ENCOUNTER — INPATIENT HOSPITAL (OUTPATIENT)
Dept: URBAN - METROPOLITAN AREA HOSPITAL 84 | Facility: HOSPITAL | Age: 73
End: 2023-05-23

## 2023-05-23 ENCOUNTER — APPOINTMENT (OUTPATIENT)
Dept: CT IMAGING | Facility: HOSPITAL | Age: 73
End: 2023-05-23
Payer: MEDICARE

## 2023-05-23 DIAGNOSIS — R10.9 UNSPECIFIED ABDOMINAL PAIN: ICD-10-CM

## 2023-05-23 DIAGNOSIS — K59.00 CONSTIPATION, UNSPECIFIED: ICD-10-CM

## 2023-05-23 DIAGNOSIS — K62.4 STENOSIS OF ANUS AND RECTUM: ICD-10-CM

## 2023-05-23 DIAGNOSIS — R93.3 ABNORMAL FINDINGS ON DIAGNOSTIC IMAGING OF OTHER PARTS OF DI: ICD-10-CM

## 2023-05-23 DIAGNOSIS — R74.01 ELEVATION OF LEVELS OF LIVER TRANSAMINASE LEVELS: ICD-10-CM

## 2023-05-23 DIAGNOSIS — K62.6 ULCER OF ANUS AND RECTUM: ICD-10-CM

## 2023-05-23 PROBLEM — E43 SEVERE MALNUTRITION: Status: ACTIVE | Noted: 2023-05-23

## 2023-05-23 LAB
ALBUMIN SERPL-MCNC: 3.1 G/DL (ref 3.5–5.2)
ALBUMIN SERPL-MCNC: 3.2 G/DL (ref 3.5–5.2)
ALBUMIN SERPL-MCNC: 3.3 G/DL (ref 3.5–5.2)
ALBUMIN/GLOB SERPL: 1.1 G/DL
ALBUMIN/GLOB SERPL: 1.2 G/DL
ALBUMIN/GLOB SERPL: 1.2 G/DL
ALP SERPL-CCNC: 245 U/L (ref 39–117)
ALP SERPL-CCNC: 256 U/L (ref 39–117)
ALP SERPL-CCNC: 269 U/L (ref 39–117)
ALT SERPL W P-5'-P-CCNC: 33 U/L (ref 1–41)
ALT SERPL W P-5'-P-CCNC: 34 U/L (ref 1–41)
ALT SERPL W P-5'-P-CCNC: 41 U/L (ref 1–41)
AMMONIA BLD-SCNC: 27 UMOL/L (ref 16–60)
ANION GAP SERPL CALCULATED.3IONS-SCNC: 11 MMOL/L (ref 5–15)
ANION GAP SERPL CALCULATED.3IONS-SCNC: 13 MMOL/L (ref 5–15)
ANION GAP SERPL CALCULATED.3IONS-SCNC: 14 MMOL/L (ref 5–15)
ARTERIAL PATENCY WRIST A: POSITIVE
ARTERIAL PATENCY WRIST A: POSITIVE
AST SERPL-CCNC: 31 U/L (ref 1–40)
AST SERPL-CCNC: 33 U/L (ref 1–40)
AST SERPL-CCNC: 41 U/L (ref 1–40)
ATMOSPHERIC PRESS: ABNORMAL MM[HG]
ATMOSPHERIC PRESS: ABNORMAL MM[HG]
BASE EXCESS BLDA CALC-SCNC: 3.7 MMOL/L (ref 0–3)
BASE EXCESS BLDA CALC-SCNC: 4.3 MMOL/L (ref 0–3)
BASOPHILS # BLD AUTO: 0.1 10*3/MM3 (ref 0–0.2)
BASOPHILS NFR BLD AUTO: 0.6 % (ref 0–1.5)
BASOPHILS NFR BLD AUTO: 0.7 % (ref 0–1.5)
BASOPHILS NFR BLD AUTO: 0.8 % (ref 0–1.5)
BDY SITE: ABNORMAL
BDY SITE: ABNORMAL
BILIRUB SERPL-MCNC: 0.7 MG/DL (ref 0–1.2)
BILIRUB SERPL-MCNC: 0.8 MG/DL (ref 0–1.2)
BILIRUB SERPL-MCNC: 0.8 MG/DL (ref 0–1.2)
BUN SERPL-MCNC: 13 MG/DL (ref 8–23)
BUN SERPL-MCNC: 15 MG/DL (ref 8–23)
BUN SERPL-MCNC: 16 MG/DL (ref 8–23)
BUN/CREAT SERPL: 14.4 (ref 7–25)
BUN/CREAT SERPL: 16.7 (ref 7–25)
BUN/CREAT SERPL: 18.4 (ref 7–25)
CA-I BLDA-SCNC: 1.18 MMOL/L (ref 1.15–1.33)
CA-I BLDA-SCNC: 1.24 MMOL/L (ref 1.15–1.33)
CALCIUM SPEC-SCNC: 8.8 MG/DL (ref 8.6–10.5)
CALCIUM SPEC-SCNC: 9.1 MG/DL (ref 8.6–10.5)
CALCIUM SPEC-SCNC: 9.4 MG/DL (ref 8.6–10.5)
CHLORIDE SERPL-SCNC: 95 MMOL/L (ref 98–107)
CHLORIDE SERPL-SCNC: 98 MMOL/L (ref 98–107)
CHLORIDE SERPL-SCNC: 99 MMOL/L (ref 98–107)
CO2 SERPL-SCNC: 24 MMOL/L (ref 22–29)
CO2 SERPL-SCNC: 25 MMOL/L (ref 22–29)
CO2 SERPL-SCNC: 25 MMOL/L (ref 22–29)
CREAT SERPL-MCNC: 0.87 MG/DL (ref 0.76–1.27)
CREAT SERPL-MCNC: 0.9 MG/DL (ref 0.76–1.27)
CREAT SERPL-MCNC: 0.9 MG/DL (ref 0.76–1.27)
D-LACTATE SERPL-SCNC: 1.1 MMOL/L (ref 0.2–2)
D-LACTATE SERPL-SCNC: 1.6 MMOL/L (ref 0.2–2)
DEPRECATED RDW RBC AUTO: 38.9 FL (ref 37–54)
DEPRECATED RDW RBC AUTO: 40.3 FL (ref 37–54)
DEPRECATED RDW RBC AUTO: 40.7 FL (ref 37–54)
EGFRCR SERPLBLD CKD-EPI 2021: 90.7 ML/MIN/1.73
EGFRCR SERPLBLD CKD-EPI 2021: 90.7 ML/MIN/1.73
EGFRCR SERPLBLD CKD-EPI 2021: 91.7 ML/MIN/1.73
EOSINOPHIL # BLD AUTO: 0 10*3/MM3 (ref 0–0.4)
EOSINOPHIL # BLD AUTO: 0 10*3/MM3 (ref 0–0.4)
EOSINOPHIL # BLD AUTO: 0.1 10*3/MM3 (ref 0–0.4)
EOSINOPHIL NFR BLD AUTO: 0 % (ref 0.3–6.2)
EOSINOPHIL NFR BLD AUTO: 0.1 % (ref 0.3–6.2)
EOSINOPHIL NFR BLD AUTO: 1 % (ref 0.3–6.2)
ERYTHROCYTE [DISTWIDTH] IN BLOOD BY AUTOMATED COUNT: 12.9 % (ref 12.3–15.4)
ERYTHROCYTE [DISTWIDTH] IN BLOOD BY AUTOMATED COUNT: 12.9 % (ref 12.3–15.4)
ERYTHROCYTE [DISTWIDTH] IN BLOOD BY AUTOMATED COUNT: 13.2 % (ref 12.3–15.4)
GLOBULIN UR ELPH-MCNC: 2.6 GM/DL
GLOBULIN UR ELPH-MCNC: 2.6 GM/DL
GLOBULIN UR ELPH-MCNC: 2.9 GM/DL
GLUCOSE BLDC GLUCOMTR-MCNC: 111 MG/DL (ref 74–100)
GLUCOSE BLDC GLUCOMTR-MCNC: 111 MG/DL (ref 74–100)
GLUCOSE BLDC GLUCOMTR-MCNC: 122 MG/DL (ref 74–100)
GLUCOSE BLDC GLUCOMTR-MCNC: 122 MG/DL (ref 74–100)
GLUCOSE SERPL-MCNC: 103 MG/DL (ref 65–99)
GLUCOSE SERPL-MCNC: 105 MG/DL (ref 65–99)
GLUCOSE SERPL-MCNC: 116 MG/DL (ref 65–99)
HCO3 BLDA-SCNC: 26.4 MMOL/L (ref 21–28)
HCO3 BLDA-SCNC: 27 MMOL/L (ref 21–28)
HCT VFR BLD AUTO: 33.9 % (ref 37.5–51)
HCT VFR BLD AUTO: 35.6 % (ref 37.5–51)
HCT VFR BLD AUTO: 36 % (ref 37.5–51)
HCT VFR BLDA CALC: 34 % (ref 38–51)
HCT VFR BLDA CALC: 36 % (ref 38–51)
HEMODILUTION: NO
HEMODILUTION: NO
HGB BLD-MCNC: 11 G/DL (ref 13–17.7)
HGB BLD-MCNC: 11.6 G/DL (ref 13–17.7)
HGB BLD-MCNC: 11.7 G/DL (ref 13–17.7)
HGB BLDA-MCNC: 11.7 G/DL (ref 12–17)
HGB BLDA-MCNC: 12.3 G/DL (ref 12–17)
HOLD SPECIMEN: NORMAL
INHALED O2 CONCENTRATION: 36 %
INHALED O2 CONCENTRATION: 36 %
INR PPP: 1.09 (ref 0.93–1.1)
LYMPHOCYTES # BLD AUTO: 0.2 10*3/MM3 (ref 0.7–3.1)
LYMPHOCYTES # BLD AUTO: 0.3 10*3/MM3 (ref 0.7–3.1)
LYMPHOCYTES # BLD AUTO: 0.7 10*3/MM3 (ref 0.7–3.1)
LYMPHOCYTES NFR BLD AUTO: 2.3 % (ref 19.6–45.3)
LYMPHOCYTES NFR BLD AUTO: 2.5 % (ref 19.6–45.3)
LYMPHOCYTES NFR BLD AUTO: 8.8 % (ref 19.6–45.3)
MAGNESIUM SERPL-MCNC: 1.5 MG/DL (ref 1.6–2.4)
MAGNESIUM SERPL-MCNC: 1.9 MG/DL (ref 1.6–2.4)
MCH RBC QN AUTO: 28.2 PG (ref 26.6–33)
MCH RBC QN AUTO: 28.5 PG (ref 26.6–33)
MCH RBC QN AUTO: 28.8 PG (ref 26.6–33)
MCHC RBC AUTO-ENTMCNC: 32.3 G/DL (ref 31.5–35.7)
MCHC RBC AUTO-ENTMCNC: 32.4 G/DL (ref 31.5–35.7)
MCHC RBC AUTO-ENTMCNC: 32.5 G/DL (ref 31.5–35.7)
MCV RBC AUTO: 86.8 FL (ref 79–97)
MCV RBC AUTO: 88.1 FL (ref 79–97)
MCV RBC AUTO: 88.9 FL (ref 79–97)
MODALITY: ABNORMAL
MODALITY: ABNORMAL
MONOCYTES # BLD AUTO: 0.5 10*3/MM3 (ref 0.1–0.9)
MONOCYTES # BLD AUTO: 0.7 10*3/MM3 (ref 0.1–0.9)
MONOCYTES # BLD AUTO: 1.1 10*3/MM3 (ref 0.1–0.9)
MONOCYTES NFR BLD AUTO: 14.3 % (ref 5–12)
MONOCYTES NFR BLD AUTO: 5.9 % (ref 5–12)
MONOCYTES NFR BLD AUTO: 6.3 % (ref 5–12)
NEUTROPHILS NFR BLD AUTO: 10.2 10*3/MM3 (ref 1.7–7)
NEUTROPHILS NFR BLD AUTO: 5.9 10*3/MM3 (ref 1.7–7)
NEUTROPHILS NFR BLD AUTO: 7.9 10*3/MM3 (ref 1.7–7)
NEUTROPHILS NFR BLD AUTO: 75.1 % (ref 42.7–76)
NEUTROPHILS NFR BLD AUTO: 90.6 % (ref 42.7–76)
NEUTROPHILS NFR BLD AUTO: 91 % (ref 42.7–76)
NRBC BLD AUTO-RTO: 0 /100 WBC (ref 0–0.2)
PCO2 BLDA: 30.6 MM HG (ref 35–48)
PCO2 BLDA: 35.8 MM HG (ref 35–48)
PH BLDA: 7.49 PH UNITS (ref 7.35–7.45)
PH BLDA: 7.54 PH UNITS (ref 7.35–7.45)
PHOSPHATE SERPL-MCNC: 2.1 MG/DL (ref 2.5–4.5)
PHOSPHATE SERPL-MCNC: 2.9 MG/DL (ref 2.5–4.5)
PLATELET # BLD AUTO: 296 10*3/MM3 (ref 140–450)
PLATELET # BLD AUTO: 310 10*3/MM3 (ref 140–450)
PLATELET # BLD AUTO: 330 10*3/MM3 (ref 140–450)
PMV BLD AUTO: 7.2 FL (ref 6–12)
PMV BLD AUTO: 7.7 FL (ref 6–12)
PMV BLD AUTO: 7.7 FL (ref 6–12)
PO2 BLDA: 35.7 MM HG (ref 83–108)
PO2 BLDA: 70.2 MM HG (ref 83–108)
POTASSIUM BLDA-SCNC: 3.5 MMOL/L (ref 3.5–4.5)
POTASSIUM BLDA-SCNC: 3.8 MMOL/L (ref 3.5–4.5)
POTASSIUM SERPL-SCNC: 3.6 MMOL/L (ref 3.5–5.2)
POTASSIUM SERPL-SCNC: 3.6 MMOL/L (ref 3.5–5.2)
POTASSIUM SERPL-SCNC: 4.7 MMOL/L (ref 3.5–5.2)
PROT SERPL-MCNC: 5.7 G/DL (ref 6–8.5)
PROT SERPL-MCNC: 5.8 G/DL (ref 6–8.5)
PROT SERPL-MCNC: 6.2 G/DL (ref 6–8.5)
PROTHROMBIN TIME: 11.6 SECONDS (ref 9.6–11.7)
RBC # BLD AUTO: 3.85 10*6/MM3 (ref 4.14–5.8)
RBC # BLD AUTO: 4.05 10*6/MM3 (ref 4.14–5.8)
RBC # BLD AUTO: 4.11 10*6/MM3 (ref 4.14–5.8)
SAO2 % BLDCOA: 77.1 % (ref 94–98)
SAO2 % BLDCOA: 95.2 % (ref 94–98)
SODIUM BLD-SCNC: 134 MMOL/L (ref 138–146)
SODIUM BLD-SCNC: 135 MMOL/L (ref 138–146)
SODIUM SERPL-SCNC: 133 MMOL/L (ref 136–145)
SODIUM SERPL-SCNC: 135 MMOL/L (ref 136–145)
SODIUM SERPL-SCNC: 136 MMOL/L (ref 136–145)
WBC NRBC COR # BLD: 11.2 10*3/MM3 (ref 3.4–10.8)
WBC NRBC COR # BLD: 7.8 10*3/MM3 (ref 3.4–10.8)
WBC NRBC COR # BLD: 8.7 10*3/MM3 (ref 3.4–10.8)

## 2023-05-23 PROCEDURE — 82803 BLOOD GASES ANY COMBINATION: CPT

## 2023-05-23 PROCEDURE — 45331 SIGMOIDOSCOPY AND BIOPSY: CPT | Performed by: INTERNAL MEDICINE

## 2023-05-23 PROCEDURE — 80053 COMPREHEN METABOLIC PANEL: CPT | Performed by: FAMILY MEDICINE

## 2023-05-23 PROCEDURE — 99231 SBSQ HOSP IP/OBS SF/LOW 25: CPT | Performed by: INTERNAL MEDICINE

## 2023-05-23 PROCEDURE — 80053 COMPREHEN METABOLIC PANEL: CPT | Performed by: NURSE PRACTITIONER

## 2023-05-23 PROCEDURE — 84100 ASSAY OF PHOSPHORUS: CPT | Performed by: INTERNAL MEDICINE

## 2023-05-23 PROCEDURE — 85018 HEMOGLOBIN: CPT

## 2023-05-23 PROCEDURE — 82330 ASSAY OF CALCIUM: CPT

## 2023-05-23 PROCEDURE — 80053 COMPREHEN METABOLIC PANEL: CPT | Performed by: INTERNAL MEDICINE

## 2023-05-23 PROCEDURE — 82140 ASSAY OF AMMONIA: CPT | Performed by: INTERNAL MEDICINE

## 2023-05-23 PROCEDURE — 85610 PROTHROMBIN TIME: CPT | Performed by: NURSE PRACTITIONER

## 2023-05-23 PROCEDURE — 94799 UNLISTED PULMONARY SVC/PX: CPT

## 2023-05-23 PROCEDURE — 74176 CT ABD & PELVIS W/O CONTRAST: CPT

## 2023-05-23 PROCEDURE — 85025 COMPLETE CBC W/AUTO DIFF WBC: CPT

## 2023-05-23 PROCEDURE — 80051 ELECTROLYTE PANEL: CPT

## 2023-05-23 PROCEDURE — 25010000002 MAGNESIUM SULFATE 2 GM/50ML SOLUTION: Performed by: INTERNAL MEDICINE

## 2023-05-23 PROCEDURE — 82948 REAGENT STRIP/BLOOD GLUCOSE: CPT

## 2023-05-23 PROCEDURE — 83735 ASSAY OF MAGNESIUM: CPT

## 2023-05-23 PROCEDURE — 70450 CT HEAD/BRAIN W/O DYE: CPT

## 2023-05-23 PROCEDURE — 84100 ASSAY OF PHOSPHORUS: CPT

## 2023-05-23 PROCEDURE — 0 CEFEPIME PER 500 MG: Performed by: NURSE PRACTITIONER

## 2023-05-23 PROCEDURE — 93005 ELECTROCARDIOGRAM TRACING: CPT | Performed by: FAMILY MEDICINE

## 2023-05-23 PROCEDURE — 25010000002 LORAZEPAM PER 2 MG: Performed by: INTERNAL MEDICINE

## 2023-05-23 PROCEDURE — 0DJD8ZZ INSPECTION OF LOWER INTESTINAL TRACT, VIA NATURAL OR ARTIFICIAL OPENING ENDOSCOPIC: ICD-10-PCS | Performed by: INTERNAL MEDICINE

## 2023-05-23 PROCEDURE — 36415 COLL VENOUS BLD VENIPUNCTURE: CPT | Performed by: INTERNAL MEDICINE

## 2023-05-23 PROCEDURE — 0DJ08ZZ INSPECTION OF UPPER INTESTINAL TRACT, VIA NATURAL OR ARTIFICIAL OPENING ENDOSCOPIC: ICD-10-PCS | Performed by: INTERNAL MEDICINE

## 2023-05-23 PROCEDURE — 25010000002 VANCOMYCIN 10 G RECONSTITUTED SOLUTION: Performed by: NURSE PRACTITIONER

## 2023-05-23 PROCEDURE — 25010000002 LORAZEPAM PER 2 MG

## 2023-05-23 PROCEDURE — 83605 ASSAY OF LACTIC ACID: CPT

## 2023-05-23 PROCEDURE — 85025 COMPLETE CBC W/AUTO DIFF WBC: CPT | Performed by: INTERNAL MEDICINE

## 2023-05-23 PROCEDURE — 25010000002 PROPOFOL 10 MG/ML EMULSION: Performed by: NURSE ANESTHETIST, CERTIFIED REGISTERED

## 2023-05-23 PROCEDURE — 43235 EGD DIAGNOSTIC BRUSH WASH: CPT | Performed by: INTERNAL MEDICINE

## 2023-05-23 PROCEDURE — 88305 TISSUE EXAM BY PATHOLOGIST: CPT | Performed by: INTERNAL MEDICINE

## 2023-05-23 PROCEDURE — 83735 ASSAY OF MAGNESIUM: CPT | Performed by: INTERNAL MEDICINE

## 2023-05-23 PROCEDURE — 85025 COMPLETE CBC W/AUTO DIFF WBC: CPT | Performed by: FAMILY MEDICINE

## 2023-05-23 PROCEDURE — 36600 WITHDRAWAL OF ARTERIAL BLOOD: CPT

## 2023-05-23 PROCEDURE — 87040 BLOOD CULTURE FOR BACTERIA: CPT | Performed by: NURSE PRACTITIONER

## 2023-05-23 RX ORDER — LORAZEPAM 2 MG/ML
0.5 INJECTION INTRAMUSCULAR EVERY 6 HOURS PRN
Status: DISCONTINUED | OUTPATIENT
Start: 2023-05-23 | End: 2023-05-26

## 2023-05-23 RX ORDER — MAGNESIUM SULFATE HEPTAHYDRATE 40 MG/ML
2 INJECTION, SOLUTION INTRAVENOUS
Status: COMPLETED | OUTPATIENT
Start: 2023-05-24 | End: 2023-05-24

## 2023-05-23 RX ORDER — ONDANSETRON 2 MG/ML
4 INJECTION INTRAMUSCULAR; INTRAVENOUS EVERY 6 HOURS PRN
Status: CANCELLED | OUTPATIENT
Start: 2023-05-23

## 2023-05-23 RX ORDER — SORBITOL SOLUTION 70 %
50 SOLUTION, ORAL MISCELLANEOUS ONCE
Status: COMPLETED | OUTPATIENT
Start: 2023-05-23 | End: 2023-05-23

## 2023-05-23 RX ORDER — LIDOCAINE HYDROCHLORIDE 20 MG/ML
INJECTION, SOLUTION INFILTRATION; PERINEURAL AS NEEDED
Status: DISCONTINUED | OUTPATIENT
Start: 2023-05-23 | End: 2023-05-23 | Stop reason: SURG

## 2023-05-23 RX ORDER — PROPOFOL 10 MG/ML
VIAL (ML) INTRAVENOUS AS NEEDED
Status: DISCONTINUED | OUTPATIENT
Start: 2023-05-23 | End: 2023-05-23 | Stop reason: SURG

## 2023-05-23 RX ORDER — POLYETHYLENE GLYCOL 3350 17 G/17G
17 POWDER, FOR SOLUTION ORAL DAILY
Status: DISCONTINUED | OUTPATIENT
Start: 2023-05-23 | End: 2023-05-26 | Stop reason: HOSPADM

## 2023-05-23 RX ORDER — LORAZEPAM 2 MG/ML
1 INJECTION INTRAMUSCULAR EVERY 4 HOURS PRN
Status: DISCONTINUED | OUTPATIENT
Start: 2023-05-23 | End: 2023-05-23

## 2023-05-23 RX ORDER — SODIUM CHLORIDE 9 MG/ML
INJECTION, SOLUTION INTRAVENOUS CONTINUOUS PRN
Status: DISCONTINUED | OUTPATIENT
Start: 2023-05-23 | End: 2023-05-23 | Stop reason: SURG

## 2023-05-23 RX ORDER — ONDANSETRON 4 MG/1
4 TABLET, FILM COATED ORAL EVERY 6 HOURS PRN
Status: CANCELLED | OUTPATIENT
Start: 2023-05-23

## 2023-05-23 RX ORDER — RISPERIDONE 0.5 MG/1
1 TABLET, ORALLY DISINTEGRATING ORAL NIGHTLY
Status: DISCONTINUED | OUTPATIENT
Start: 2023-05-23 | End: 2023-05-26

## 2023-05-23 RX ADMIN — PROPOFOL 20 MG: 10 INJECTION, EMULSION INTRAVENOUS at 15:51

## 2023-05-23 RX ADMIN — PROPOFOL 20 MG: 10 INJECTION, EMULSION INTRAVENOUS at 15:45

## 2023-05-23 RX ADMIN — ACETAMINOPHEN 650 MG: 325 TABLET, FILM COATED ORAL at 20:56

## 2023-05-23 RX ADMIN — MAGNESIUM SULFATE HEPTAHYDRATE 2 G: 2 INJECTION, SOLUTION INTRAVENOUS at 23:58

## 2023-05-23 RX ADMIN — SODIUM CHLORIDE 75 ML/HR: 9 INJECTION, SOLUTION INTRAVENOUS at 22:45

## 2023-05-23 RX ADMIN — DIPHENHYDRAMINE HYDROCHLORIDE AND LIDOCAINE HYDROCHLORIDE AND ALUMINUM HYDROXIDE AND MAGNESIUM HYDRO 5 ML: KIT at 22:17

## 2023-05-23 RX ADMIN — PROPOFOL 50 MG: 10 INJECTION, EMULSION INTRAVENOUS at 15:33

## 2023-05-23 RX ADMIN — PROPOFOL 20 MG: 10 INJECTION, EMULSION INTRAVENOUS at 15:43

## 2023-05-23 RX ADMIN — MIRTAZAPINE 7.5 MG: 7.5 TABLET ORAL at 22:17

## 2023-05-23 RX ADMIN — PROPOFOL 20 MG: 10 INJECTION, EMULSION INTRAVENOUS at 15:48

## 2023-05-23 RX ADMIN — SENNOSIDES AND DOCUSATE SODIUM 2 TABLET: 50; 8.6 TABLET ORAL at 08:07

## 2023-05-23 RX ADMIN — LORAZEPAM 0.5 MG: 2 INJECTION INTRAMUSCULAR; INTRAVENOUS at 18:42

## 2023-05-23 RX ADMIN — RISPERIDONE 1 MG: 0.5 TABLET, ORALLY DISINTEGRATING ORAL at 22:17

## 2023-05-23 RX ADMIN — PROPOFOL 100 MG: 10 INJECTION, EMULSION INTRAVENOUS at 15:29

## 2023-05-23 RX ADMIN — SORBITOL SOLUTION (BULK) 50 ML: 70 SOLUTION at 07:36

## 2023-05-23 RX ADMIN — SODIUM CHLORIDE: 0.9 INJECTION, SOLUTION INTRAVENOUS at 15:14

## 2023-05-23 RX ADMIN — SODIUM SULFATE, POTASSIUM SULFATE, MAGNESIUM SULFATE 1 BOTTLE: 1.6; 3.13; 17.5 SOLUTION ORAL at 00:26

## 2023-05-23 RX ADMIN — Medication 10 ML: at 22:17

## 2023-05-23 RX ADMIN — LIDOCAINE HYDROCHLORIDE 100 MG: 20 INJECTION, SOLUTION INFILTRATION; PERINEURAL at 15:29

## 2023-05-23 RX ADMIN — Medication 10 ML: at 08:07

## 2023-05-23 RX ADMIN — LORAZEPAM 1 MG: 2 INJECTION INTRAMUSCULAR; INTRAVENOUS at 03:21

## 2023-05-23 RX ADMIN — OXYCODONE 5 MG: 5 TABLET ORAL at 02:02

## 2023-05-23 RX ADMIN — DIPHENHYDRAMINE HYDROCHLORIDE AND LIDOCAINE HYDROCHLORIDE AND ALUMINUM HYDROXIDE AND MAGNESIUM HYDRO 5 ML: KIT at 07:36

## 2023-05-23 RX ADMIN — LISINOPRIL 40 MG: 20 TABLET ORAL at 10:28

## 2023-05-23 RX ADMIN — Medication 3 ML: at 07:41

## 2023-05-23 RX ADMIN — DIPHENHYDRAMINE HYDROCHLORIDE AND LIDOCAINE HYDROCHLORIDE AND ALUMINUM HYDROXIDE AND MAGNESIUM HYDRO 5 ML: KIT at 13:09

## 2023-05-23 RX ADMIN — Medication 5 MG: at 22:17

## 2023-05-23 RX ADMIN — POLYETHYLENE GLYCOL 3350 17 G: 17 POWDER, FOR SOLUTION ORAL at 18:01

## 2023-05-23 RX ADMIN — SODIUM CHLORIDE 500 ML: 9 INJECTION, SOLUTION INTRAVENOUS at 22:13

## 2023-05-23 RX ADMIN — Medication 3 ML: at 08:07

## 2023-05-23 RX ADMIN — SODIUM CHLORIDE 75 ML/HR: 9 INJECTION, SOLUTION INTRAVENOUS at 12:26

## 2023-05-23 RX ADMIN — BISACODYL 20 MG: 5 TABLET, COATED ORAL at 00:25

## 2023-05-23 RX ADMIN — SORBITOL SOLUTION (BULK) 50 ML: 70 SOLUTION at 12:24

## 2023-05-23 RX ADMIN — METOPROLOL TARTRATE 50 MG: 50 TABLET ORAL at 10:28

## 2023-05-23 RX ADMIN — VANCOMYCIN HYDROCHLORIDE 1500 MG: 10 INJECTION, POWDER, LYOPHILIZED, FOR SOLUTION INTRAVENOUS at 23:58

## 2023-05-23 RX ADMIN — PROPOFOL 20 MG: 10 INJECTION, EMULSION INTRAVENOUS at 15:53

## 2023-05-23 RX ADMIN — CEFEPIME 2 G: 2 INJECTION, POWDER, FOR SOLUTION INTRAVENOUS at 22:46

## 2023-05-23 RX ADMIN — METOPROLOL TARTRATE 50 MG: 50 TABLET ORAL at 18:01

## 2023-05-23 NOTE — PROGRESS NOTES
LOS: 0 days   Patient Care Team:  William Crawford MD as PCP - General (Family Medicine)    Subjective     Patient is confused this morning    Review of Systems   Unable to perform ROS: Mental status change           Objective     Vital Signs  Temp:  [97.7 °F (36.5 °C)-98.1 °F (36.7 °C)] 98.1 °F (36.7 °C)  Heart Rate:  [] 127  Resp:  [14-22] 22  BP: ()/(63-87) 129/87      Physical Exam  Vitals reviewed.   Constitutional:       Appearance: He is not ill-appearing.   HENT:      Head: Normocephalic and atraumatic.      Right Ear: External ear normal.      Left Ear: External ear normal.      Nose: Nose normal.      Mouth/Throat:      Mouth: Mucous membranes are dry.   Eyes:      General:         Right eye: No discharge.         Left eye: No discharge.   Cardiovascular:      Rate and Rhythm: Normal rate and regular rhythm.      Pulses: Normal pulses.   Pulmonary:      Effort: Pulmonary effort is normal.      Breath sounds: Normal breath sounds.   Abdominal:      General: Bowel sounds are normal. There is distension.   Musculoskeletal:         General: Normal range of motion.      Cervical back: Normal range of motion.   Skin:     General: Skin is warm.      Coloration: Skin is pale.   Neurological:      Mental Status: He is alert. He is disoriented.   Psychiatric:         Cognition and Memory: Cognition is impaired. Memory is impaired.              Results Review:    Lab Results (last 24 hours)     Procedure Component Value Units Date/Time    Magnesium [[id number]]  (Normal) Collected: 05/23/23 0248    Specimen: Blood from Arm, Right Updated: 05/23/23 0403     Magnesium 1.9 mg/dL     Comprehensive Metabolic Panel [[id number]]  (Abnormal) Collected: 05/23/23 0248    Specimen: Blood from Arm, Right Updated: 05/23/23 0403     Glucose 103 mg/dL      BUN 13 mg/dL      Creatinine 0.90 mg/dL      Sodium 133 mmol/L      Potassium 4.7 mmol/L      Comment: Slight hemolysis detected by analyzer. Results may be  affected.        Chloride 95 mmol/L      CO2 24.0 mmol/L      Calcium 9.4 mg/dL      Total Protein 6.2 g/dL      Albumin 3.3 g/dL      ALT (SGPT) 41 U/L      AST (SGOT) 41 U/L      Alkaline Phosphatase 269 U/L      Total Bilirubin 0.7 mg/dL      Globulin 2.9 gm/dL      A/G Ratio 1.1 g/dL      BUN/Creatinine Ratio 14.4     Anion Gap 14.0 mmol/L      eGFR 90.7 mL/min/1.73     Narrative:      GFR Normal >60  Chronic Kidney Disease <60  Kidney Failure <15    The GFR formula is only valid for adults with stable renal function between ages 18 and 70.    Phosphorus [[id number]]  (Normal) Collected: 05/23/23 0248    Specimen: Blood from Arm, Right Updated: 05/23/23 0403     Phosphorus 2.9 mg/dL     Protime-INR [[id number]]  (Normal) Collected: 05/23/23 0248    Specimen: Blood from Arm, Right Updated: 05/23/23 0347     Protime 11.6 Seconds      INR 1.09    CBC & Differential [[id number]]  (Abnormal) Collected: 05/23/23 0248    Specimen: Blood from Arm, Right Updated: 05/23/23 0338    Narrative:      The following orders were created for panel order CBC & Differential.  Procedure                               Abnormality         Status                     ---------                               -----------         ------                     CBC Auto Differential[[id number]]        Abnormal            Final result                 Please view results for these tests on the individual orders.    CBC Auto Differential [[id number]]  (Abnormal) Collected: 05/23/23 0248    Specimen: Blood from Arm, Right Updated: 05/23/23 0338     WBC 7.80 10*3/mm3      RBC 4.05 10*6/mm3      Hemoglobin 11.7 g/dL      Hematocrit 36.0 %      MCV 88.9 fL      MCH 28.8 pg      MCHC 32.4 g/dL      RDW 12.9 %      RDW-SD 40.3 fl      MPV 7.7 fL      Platelets 330 10*3/mm3      Neutrophil % 75.1 %      Lymphocyte % 8.8 %      Monocyte % 14.3 %      Eosinophil % 1.0 %      Basophil % 0.8 %      Neutrophils, Absolute 5.90 10*3/mm3      Lymphocytes, Absolute 0.70  10*3/mm3      Monocytes, Absolute 1.10 10*3/mm3      Eosinophils, Absolute 0.10 10*3/mm3      Basophils, Absolute 0.10 10*3/mm3      nRBC 0.0 /100 WBC            Imaging Results (Last 24 Hours)     ** No results found for the last 24 hours. **               I reviewed the patient's new clinical results.    Medication Review:   Scheduled Meds:albumin human, 37.5 g, Intravenous, Once   Or  albumin human, 50 g, Intravenous, Once   Or  albumin human, 62.5 g, Intravenous, Once   Or  albumin human, 75 g, Intravenous, Once   Or  albumin human, 87.5 g, Intravenous, Once   Or  albumin human, 100 g, Intravenous, Once   Or  albumin human, 112.5 g, Intravenous, Once  famotidine, 40 mg, Oral, Daily  First Mouthwash (Magic Mouthwash), 5 mL, Swish & Spit, Q6H  lisinopril, 40 mg, Oral, Daily  melatonin, 5 mg, Oral, Nightly  metoprolol tartrate, 50 mg, Oral, BID With Meals  mirtazapine, 7.5 mg, Oral, Nightly  senna-docusate sodium, 2 tablet, Oral, BID  sodium chloride, 10 mL, Intravenous, Q12H  sodium chloride, 3 mL, Intravenous, Q12H  sorbitol, 50 mL, Oral, Once  sorbitol, 50 mL, Oral, Once      Continuous Infusions:sodium chloride, 75 mL/hr, Last Rate: 75 mL/hr (05/22/23 7317)      PRN Meds:.•  acetaminophen  •  senna-docusate sodium **AND** polyethylene glycol **AND** bisacodyl **AND** bisacodyl  •  HYDROmorphone  •  LORazepam  •  Magnesium Standard Dose Replacement - Follow Nurse / BPA Driven Protocol  •  nitroglycerin  •  ondansetron **OR** ondansetron  •  oxyCODONE  •  [COMPLETED] Insert Peripheral IV **AND** sodium chloride  •  sodium chloride     Interval History:    5/22 patient is to undergo EGD/colonoscopy today however due to chronic constipation and colonic burden patient is just now this morning beginning to clear bowels.  He has become confused per wife at bedside earlier this morning.  CEA is normal at 25 elevated LFTs most likely due to liver mets oncology and GI following.  Also plans for biopsy of liver and  paracentesis.  Continue supportive care and monitor.  We will discontinue IV fluids this morning and restart after scopes.  We will decrease Dilaudid, cont Ativan continue oxy and give risperidone at night see if this improves mentation. CT head in am and another ammonia level    Assessment & Plan     Peritoneal carcinoma ptosis  Liver metastasis  Ascites  Unintentional weight loss  Abdominal pain  Constipation  Abnormal CT showing peritoneal Gautam with ptosis, liver metastasis, liver left renal lesion, small quantity of ascites  Elevated LFTs  Mild normocytic anemia  Anorexia  Hypertension  History of prostate cancer    Plan for disposition:RONALDO Epps, APRN  05/23/23  11:13 EDT

## 2023-05-23 NOTE — CONSULTS
"Nutrition Services    Patient Name: Cali Cavazos  YOB: 1950  MRN: [id number]  Admission date: 5/21/2023    Comment:  -- Severe chronic disease related malnutrition related to past medical history including metastatic cancer as evidenced by severe fat/muscle loss per physical exam and PO intakes less than 75% for greater than 3 months.  See MSA below.    -- Add Boost Plus BID (Provides 720 kcals, 28 g protein if consumed) when not NPO      PPE Documentation        PPE Worn By Provider mask, gloves and eye protection   PPE Worn By Patient  None      CLINICAL NUTRITION ASSESSMENT      Reason for Assessment 5/23: MST of 4      H&P      Past Medical History:   Diagnosis Date   • Hypertension    • Prostate cancer        Past Surgical History:   Procedure Laterality Date   • BACK SURGERY      two back surgeries 1996   • COLONOSCOPY  2008        Current Problems   Peritoneal carcinomatosis  - planned liver biopsy 5/25/23    Hyponatremia     Constipation  -GI consult   - S/P EGD 5/22  - S/P colonoscopy 5/23  - S/P paracentesis 5/22    Hypertension       Encounter Information        Trending Narrative     5/23: Admitted for constipation, poor appetite, and abdominal discomfort and distention.  Recent metastatic cancer diagnosis of unknown origin.  RD visited patient at bedside.  Patient reports decrease in appetite, no N/V, no chewing/swallowing issues noted, reports recent 20# weight loss, #.  Likes Boost, will add for when patient not NPO.  NFPE completed, consistent with nutrition diagnosis of malnutrition using AND/ASPEN criteria. See MSA below.        Anthropometrics        Current Height, Weight Height: 182.9 cm (72\")  Weight: 66.7 kg (147 lb 0.8 oz) (05/21/23 2201)       Ideal Body Weight (IBW) 178#   Usual Body Weight (UBW) 170# per patient        Trending Weight Hx     This admission: 5/23: 147#             PTA: No weight history to note per chart review     Wt Readings from Last 30 " Encounters:   05/21/23 2201 66.7 kg (147 lb 0.8 oz)   05/15/23 1448 67 kg (147 lb 9.6 oz)      BMI kg/m2 Body mass index is 19.94 kg/m².       Labs        Pertinent Labs    Results from last 7 days   Lab Units 05/23/23  0248 05/22/23 0447 05/21/23  2352   SODIUM mmol/L 133* 131* 129*   POTASSIUM mmol/L 4.7 4.8 4.9   CHLORIDE mmol/L 95* 95* 95*   CO2 mmol/L 24.0 27.0 24.0   BUN mg/dL 13 19 19   CREATININE mg/dL 0.90 1.02 0.99   CALCIUM mg/dL 9.4 9.9 9.5   BILIRUBIN mg/dL 0.7  --  0.4   ALK PHOS U/L 269*  --  299*   ALT (SGPT) U/L 41  --  43*   AST (SGOT) U/L 41*  --  47*   GLUCOSE mg/dL 103* 117* 101*     Results from last 7 days   Lab Units 05/23/23  0248 05/22/23 0447   MAGNESIUM mg/dL 1.9 1.9   PHOSPHORUS mg/dL 2.9 3.1   HEMOGLOBIN g/dL 11.7* 11.9*   HEMATOCRIT % 36.0* 36.5*     No results found for: COVID19  No results found for: HGBA1C     Medications    Scheduled Medications albumin human, 37.5 g, Intravenous, Once   Or  albumin human, 50 g, Intravenous, Once   Or  albumin human, 62.5 g, Intravenous, Once   Or  albumin human, 75 g, Intravenous, Once   Or  albumin human, 87.5 g, Intravenous, Once   Or  albumin human, 100 g, Intravenous, Once   Or  albumin human, 112.5 g, Intravenous, Once  famotidine, 40 mg, Oral, Daily  First Mouthwash (Magic Mouthwash), 5 mL, Swish & Spit, Q6H  lisinopril, 40 mg, Oral, Daily  melatonin, 5 mg, Oral, Nightly  metoprolol tartrate, 50 mg, Oral, BID With Meals  mirtazapine, 7.5 mg, Oral, Nightly  risperiDONE, 1 mg, Oral, Nightly  senna-docusate sodium, 2 tablet, Oral, BID  sodium chloride, 10 mL, Intravenous, Q12H  sodium chloride, 3 mL, Intravenous, Q12H  sorbitol, 50 mL, Oral, Once        Infusions sodium chloride, 75 mL/hr, Last Rate: 75 mL/hr (05/23/23 1226)        PRN Medications •  acetaminophen  •  senna-docusate sodium **AND** polyethylene glycol **AND** bisacodyl **AND** bisacodyl  •  HYDROmorphone  •  Magnesium Standard Dose Replacement - Follow Nurse / BPA Driven  Protocol  •  nitroglycerin  •  ondansetron **OR** ondansetron  •  oxyCODONE  •  [COMPLETED] Insert Peripheral IV **AND** sodium chloride  •  sodium chloride     Physical Findings        Trending Physical   Appearance, NFPE 5/23: NFPE completed, consistent with nutrition diagnosis of malnutrition using AND/ASPEN criteria. See MSA below.      --  Edema  No edema documented      Bowel Function Last BM 5/23 (today)     Tubes No feeding tube      Chewing/Swallowing No known issues, SLP following, to see after procedures today     Skin Intact      --  Current Nutrition Orders & Evaluation of Intake       Oral Nutrition     Food Allergies NKFA   Current PO Diet NPO Diet NPO Type: Strict NPO   Supplement None ordered    PO Evaluation     Trending % PO Intake 5/23: 25% x 2 documented meals since admission    --  Nutritional Risk Screening        NRS-2002 Score          Nutrition Diagnosis         Nutrition Dx Problem 1 Severe chronic disease related malnutrition related to past medical history including metastatic cancer as evidenced by severe fat/muscle loss per physical exam and PO intakes less than 75% for greater than 3 months.        Nutrition Dx Problem 2        Intervention Goal         Intervention Goal(s) No weight loss  PO intakes at least 50%  Accept ONS     Nutrition Intervention        RD Action Add ONS when diet advanced      Nutrition Prescription          Diet Prescription NPO   Supplement Prescription Boost Plus BID   --  Monitor/Evaluation        Monitor Per protocol, I&O, PO intake, Supplement intake, Pertinent labs, Weight, Skin status, GI status, Symptoms, POC/GOC     Malnutrition Severity Assessment      Patient meets criteria for : Severe Malnutrition  Malnutrition Type (last 8 hours)     Malnutrition Severity Assessment     Row Name 05/23/23 1343       Malnutrition Severity Assessment    Malnutrition Type Chronic Disease - Related Malnutrition    Row Name 05/23/23 1343       Insufficient Energy Intake      Insufficient Energy Intake Findings Severe    Insufficient Energy Intake  <75% of est. energy requirement for > or equal to 3 months    Row Name 05/23/23 1342       Muscle Loss    Loss of Muscle Mass Findings Severe    [place] Region Severe - deep hollowing/scooping, lack of muscle to touch, facial bones well defined    Clavicle Bone Region Severe - protruding prominent bone    Acromion Bone Region Severe - squared shoulders, bones, and acromion process protrusion prominent    Scapular Bone Region Severe - prominent bones, depressions easily visible between ribs, scapula, spine, shoulders    Dorsal Hand Region Severe - prominent depression    Patellar Region Severe - prominent bone, square looking, very little muscle definition    Anterior Thigh Region Severe - line/depression along thigh, obviously thin    Posterior Calf Region Severe - thin with very little definition/firmness    Row Name 05/23/23 1347       Fat Loss    Subcutaneous Fat Loss Findings Severe    Orbital Region  Severe - pronounced hollowness/depression, dark circles, loose saggy skin    Upper Arm Region Severe - mostly skin, very little space between folds, fingers touch    Thoracic & Lumbar Region Severe - ribs visible with prominent depressions, iliac crest very prominent    Row Name 05/23/23 0324       Criteria Met (Must meet criteria for severity in at least 2 of these categories: M Wasting, Fat Loss, Fluid, Secondary Signs, Wt. Status, Intake)    Patient meets criteria for  Severe Malnutrition                 Electronically signed by:  Laura Sharma RD  05/23/23 13:31 EDT

## 2023-05-23 NOTE — ANESTHESIA PREPROCEDURE EVALUATION
Anesthesia Evaluation     Patient summary reviewed and Nursing notes reviewed   NPO Solid Status: > 8 hours  NPO Liquid Status: > 8 hours           Airway   Mallampati: II  TM distance: >3 FB  Neck ROM: full  No difficulty expected  Dental - normal exam     Pulmonary - normal exam   Cardiovascular - normal exam    (+) hypertension,       Neuro/Psych  GI/Hepatic/Renal/Endo      Musculoskeletal     Abdominal  - normal exam    Bowel sounds: normal.   Substance History      OB/GYN          Other      history of cancer    ROS/Med Hx Other: . The examination was requested to be interpreted on a STAT basis. There are no acute or critical findings on this examination. Overall, the findings are not significantly changed from the study performed 6 days earlier on 5/12/2023.  2. Findings consistent with peritoneal and omental carcinomatosis in the abdomen and pelvis. Small quantity ascites is likely malignant ascites.  3. Pathologically enlarged mesenteric, retroperitoneal and ben hepatis adenopathy within the abdomen, consistent with malignancy.  4. Extensive hepatic metastatic disease.  5. No convincing evidence of metastatic disease within the chest. There is some borderline prominent lymph nodes within the mediastinum and adjacent to the lower SVC, for which attention at surveillance imaging is advised.  6. 1.5 cm hyperdense lesion at the lateral margin left mid kidney. It is difficult to determine whether it demonstrates internal hemorrhagic/proteinaceous components or true enhancement. Renal malignancy cannot be excluded.  7. Indeterminate 8 mm vague lucent lesion in the L2 vertebral body.  8. Stable borderline aneurysmal dilation of the thoracic and abdominal aorta.  9. Mild right hydronephrosis and hydroureter. The right ureter appears to narrow in the lower pelvis, and may be involved by peritoneal carcinomatosis.  10. Moderate to large colonic stool burden                  Anesthesia Plan    ASA 3     general      intravenous induction     Anesthetic plan, risks, benefits, and alternatives have been provided, discussed and informed consent has been obtained with: patient.    Plan discussed with CRNA.        CODE STATUS:    Level Of Support Discussed With: Patient  Code Status (Patient has no pulse and is not breathing): CPR (Attempt to Resuscitate)  Medical Interventions (Patient has pulse or is breathing): Full Support

## 2023-05-23 NOTE — OP NOTE
COLONOSCOPY, ESOPHAGOGASTRODUODENOSCOPY  Procedure Report    Patient Name:  Cali Cavazos  YOB: 1950    Date of Surgery:  2023     Indications: Carcinomatosis and peritoneal omental implants with unknown primary    Pre-op Diagnosis:   Abdominal pain, unspecified abdominal location [R10.9]  Abnormal CT of the abdomen [R93.5]         Post-op Diagnosis:  Normal upper GI endoscopy  Rectal ulcer, likely stercoral  High-grade proximal rectal stricture, question intrinsic versus extrinsic, status post biopsy  Flex sig to 50 cm with inability to advance beyond due to suboptimal prep      Procedure(s):  COLONOSCOPY  ESOPHAGOGASTRODUODENOSCOPY    Staff:  Surgeon(s):  Shiraz Adamson MD         Anesthesia: Monitored Anesthesia Care    Estimated Blood Loss: None  Implants:    Nothing was implanted during the procedure    Specimen:          Rectal stricture biopsies    Complications:  No immediate    Description of Procedure:  Informed consent was obtained from the patient.  They were placed in the left lateral decubitus position and IV conscious sedation was administered by anesthesia while being monitored continuously throughout the procedure.  Digital rectal examination revealed no external hemorrhoid, fissure or fistula in the anal canal rectal vault were unremarkable.  The video Olympus colonoscope was introduced into the rectum and was advanced to about 7 cm to 8 cm beyond the dentate line where a high-grade stricture is encountered that would not allow passage of the colonoscope.  It is very difficult to tell if this is an intrinsic mass lesion or extrinsic due to the carcinomatosis and peritoneal implants noted on CT.  There is also a linear chronic appearing stercoral ulcer in the rectum.  The scope was removed and we utilized the  upper endoscope for flexible sigmoidoscopy was advanced into the rectum and with some difficulty passed through the stricture into a more  normal-appearing distal sigmoid and mid sigmoid colon.  At 50 cm from the dentate line there is an angulation and inadequate prep where the scope loops and I cannot pass it on.  On slow withdrawal inspection reveals no obvious mucosal lesions within the limits of the prep.  The stricture again is encountered and multiple biopsies within the stricture were obtained from ischemic/questionably neoplastic tissue.  The scope was moved he tolerated the procedure well returned to recovery good condition.    Impression:  High-grade rectal stricture secondary to either malignant intrinsic versus malignant extrinsic compression.  Biopsies pending.  This is a possible source of primary tumor.    Recommendations:  Call for any postop pain fever bleeding  If the patient is placed on clear liquids or full liquids was also give MiraLAX to keep stools loose given the high-grade obstruction  Proceed with paracentesis and cytology  We will follow-up on biopsies.  If biopsies are nondiagnostic would proceed with omental versus liver biopsy.  We will also be able to evaluate cytology on paracentesis today.      Shiraz Adamson MD     Date: 5/23/2023  Time: 16:07 EDT

## 2023-05-23 NOTE — CASE MANAGEMENT/SOCIAL WORK
Discharge Planning Assessment  [facility]     Patient Name: Cali Cavazos  MRN: [id number]  Today's Date: 5/23/2023    Admit Date: 5/21/2023    Plan: D/C plan: Anticipate home with spouse.   Discharge Needs Assessment     Row Name 05/23/23 5482       Living Environment    People in Home spouse    Name(s) of People in Home wife Mae    Current Living Arrangements home    Potentially Unsafe Housing Conditions none    Primary Care Provided by self    Provides Primary Care For spouse    Family Caregiver if Needed child(leoncio), adult;spouse    Family Caregiver Names Wife, 2 adult daughters    Quality of Family Relationships helpful;involved;supportive    Able to Return to Prior Arrangements yes       Resource/Environmental Concerns    Resource/Environmental Concerns none    Transportation Concerns none       Transition Planning    Patient/Family Anticipates Transition to home with family    Patient/Family Anticipated Services at Transition none    Transportation Anticipated family or friend will provide       Discharge Needs Assessment    Readmission Within the Last 30 Days no previous admission in last 30 days    Equipment Currently Used at Home none    Concerns to be Addressed discharge planning    Anticipated Changes Related to Illness none    Equipment Needed After Discharge none    Provided Post Acute Provider List? N/A    N/A Provider List Comment anticipate home               Discharge Plan     Row Name 05/23/23 0322       Plan    Plan D/C plan: Anticipate home with spouse.    Patient/Family in Agreement with Plan yes    Plan Comments CM met with patient's daughter Steffi, pt going to [facility] for procedure during CM rounds. Pt lives at home with wife Mae, who he typically provides care for. Pt is IADL, including driving. Family to transport at discharge. Denies use of DME at home. PCP and pharmacy confirmed. No financial barriers to meds. No current use of HHC. No d/c needs identified at this time. CM will follow  for needs.                    Demographic Summary     Row Name 05/23/23 1649       General Information    Admission Type inpatient;observation    Arrived From emergency department    Required Notices Provided Observation Status Notice;Important Message from Medicare    Referral Source admission list    Reason for Consult discharge planning    Preferred Language English               Functional Status     Row Name 05/23/23 1640       Functional Status    Usual Activity Tolerance good    Current Activity Tolerance moderate       Functional Status, IADL    Medications independent    Meal Preparation independent    Housekeeping independent    Laundry independent    Shopping independent                      Patient Forms     Row Name 05/23/23 1518       Patient Forms    Important Message from Medicare (IMM) Delivered  [facility] 5/23    Delivered to Support person  Ashwin Kapadia    Method of delivery In person              Met with patient/family in room   Maintained distance greater than six feet and spent less than 15 minutes in the room.          Destin Zamorano RN

## 2023-05-23 NOTE — PLAN OF CARE
Goal Outcome Evaluation:  Patient has been confused and agitated throughout the day. Wife and daughter at bedside. Bowel prep completed. Plan for paracentesis tomorrow. Will continue to monitor.   Problem: Adult Inpatient Plan of Care  Goal: Plan of Care Review  Outcome: Ongoing, Progressing  Goal: Patient-Specific Goal (Individualized)  Outcome: Ongoing, Progressing  Goal: Absence of Hospital-Acquired Illness or Injury  Outcome: Ongoing, Progressing  Intervention: Identify and Manage Fall Risk  Recent Flowsheet Documentation  Taken 5/23/2023 1602 by Viki Martinez RN  Safety Promotion/Fall Prevention: patient off unit  Taken 5/23/2023 1200 by Viki Martinez RN  Safety Promotion/Fall Prevention: safety round/check completed  Taken 5/23/2023 0804 by Viki Martinez RN  Safety Promotion/Fall Prevention:   activity supervised   assistive device/personal items within reach   room organization consistent   safety round/check completed   nonskid shoes/slippers when out of bed  Intervention: Prevent Skin Injury  Recent Flowsheet Documentation  Taken 5/23/2023 0804 by Viki Martinez RN  Skin Protection:   adhesive use limited   skin-to-skin areas padded   skin-to-device areas padded  Intervention: Prevent and Manage VTE (Venous Thromboembolism) Risk  Recent Flowsheet Documentation  Taken 5/23/2023 0804 by Viki Martinez RN  Activity Management:   ambulated outside room   ambulated to bathroom  Intervention: Prevent Infection  Recent Flowsheet Documentation  Taken 5/23/2023 0804 by Vkii Martinez RN  Infection Prevention:   visitors restricted/screened   single patient room provided   equipment surfaces disinfected   hand hygiene promoted   environmental surveillance performed  Goal: Optimal Comfort and Wellbeing  Outcome: Ongoing, Progressing  Intervention: Provide Person-Centered Care  Recent Flowsheet Documentation  Taken 5/23/2023 0804 by Viki Mratinez RN  Trust Relationship/Rapport:   care explained   choices  provided   emotional support provided   empathic listening provided   questions answered   questions encouraged   reassurance provided   thoughts/feelings acknowledged  Goal: Readiness for Transition of Care  Outcome: Ongoing, Progressing     Problem: Skin Injury Risk Increased  Goal: Skin Health and Integrity  Outcome: Ongoing, Progressing  Intervention: Optimize Skin Protection  Recent Flowsheet Documentation  Taken 5/23/2023 0804 by Viki Martinez RN  Pressure Reduction Techniques:   frequent weight shift encouraged   heels elevated off bed   pressure points protected   weight shift assistance provided  Pressure Reduction Devices: pressure-redistributing mattress utilized  Skin Protection:   adhesive use limited   skin-to-skin areas padded   skin-to-device areas padded     Problem: Malnutrition  Goal: Improved Nutritional Intake  Outcome: Ongoing, Progressing

## 2023-05-23 NOTE — PROGRESS NOTES
Hematology/Oncology Inpatient Progress Note    PATIENT NAME: Cali Cavazos  : 1950  MRN: [id number]    CHIEF COMPLAINT: Metastatic malignancy.     HISTORY OF PRESENT ILLNESS:      5/15/2023: Mr. Cavazos was referred for the investigation and treatment of what appeared to be a metastatic malignancy. His symptoms had started at the end of  or the beginning of . He first noted a loss of appetite. More important, however, he had dysgeusia. This led slowly to a significant reduction in the amount he ate and consequently he had a drop in his weight. Slowly he developed low back pain that became progressively more intense. His dysgeusia became more severe. He had imaging of the abdomen that reported findings consistent with metastatic disease involving lymph nodes in the ben hepatis, mid abdominal mesentery and the mid abdominal retroperitoneum. Some suggestion of peritoneal carcinomatosis was also present. But more important, there were several nodules in the liver, the largest of which was about 1.8 cm. No obvious source was present, though a non-specific lesion measuring 1.5 cm was present in the mid left kidney. Also a change in caliber of the colon near the level of the rectum. The exam revealed him to be a slender man in no distress. No jaundice and not ill or in distress. No jaundice. No oral lesions and no palpable adenopathy. Lungs diminished. The liver appeared enlarged to palpation of the abdomen; at the the level of the mid clavicular line it seemed to be approximately 5 cm below the costal margin and it was hard and nodular. Family history was not contributory to the present illness. A decision was made to obtain biopsy of the liver.      2023: Mr. Cavazos came to the emergency department complaining of progressive loss of his sense of well being. Profound anorexia and inability to eat anything were other prominent symptoms. As well, he was having abdominal pain. He was  admitted for further investigations.     Subjective   5/23/2023: Feeling about the same. Tired. In spite of the preparation he received overnight, he had defecated very little. Uncomfortable.     ROS:  Review of Systems   Constitutional: Positive for activity change, appetite change, fatigue and unexpected weight change. Negative for chills, diaphoresis and fever.   HENT: Negative for congestion, dental problem, drooling, ear discharge, ear pain, facial swelling, hearing loss, mouth sores, nosebleeds, postnasal drip, rhinorrhea, sinus pressure, sinus pain, sneezing, sore throat, tinnitus, trouble swallowing and voice change.    Eyes: Negative for photophobia, pain, discharge, redness, itching and visual disturbance.   Respiratory: Negative for apnea, cough, choking, chest tightness, shortness of breath, wheezing and stridor.    Cardiovascular: Negative for chest pain, palpitations and leg swelling.   Gastrointestinal: Positive for abdominal pain. Negative for abdominal distention, anal bleeding, blood in stool, constipation, diarrhea, nausea, rectal pain and vomiting.   Endocrine: Negative for cold intolerance, heat intolerance, polydipsia and polyuria.   Genitourinary: Negative for decreased urine volume, difficulty urinating, dysuria, flank pain, frequency, genital sores, hematuria and urgency.   Musculoskeletal: Negative for arthralgias, back pain, gait problem, joint swelling, myalgias, neck pain and neck stiffness.   Skin: Negative for color change, pallor and rash.   Neurological: Negative for dizziness, tremors, seizures, syncope, facial asymmetry, speech difficulty, weakness, light-headedness, numbness and headaches.   Hematological: Negative for adenopathy. Does not bruise/bleed easily.   Psychiatric/Behavioral: Negative for agitation, behavioral problems, confusion, decreased concentration, hallucinations, self-injury, sleep disturbance and suicidal ideas. The patient is not nervous/anxious.      "    MEDICATIONS:    Scheduled Meds:  albumin human, 37.5 g, Intravenous, Once   Or  albumin human, 50 g, Intravenous, Once   Or  albumin human, 62.5 g, Intravenous, Once   Or  albumin human, 75 g, Intravenous, Once   Or  albumin human, 87.5 g, Intravenous, Once   Or  albumin human, 100 g, Intravenous, Once   Or  albumin human, 112.5 g, Intravenous, Once  famotidine, 40 mg, Oral, Daily  First Mouthwash (Magic Mouthwash), 5 mL, Swish & Spit, Q6H  lisinopril, 40 mg, Oral, Daily  melatonin, 5 mg, Oral, Nightly  metoprolol tartrate, 50 mg, Oral, BID With Meals  mirtazapine, 7.5 mg, Oral, Nightly  risperiDONE, 1 mg, Oral, Nightly  senna-docusate sodium, 2 tablet, Oral, BID  sodium chloride, 10 mL, Intravenous, Q12H  sodium chloride, 3 mL, Intravenous, Q12H  sorbitol, 50 mL, Oral, Once       Continuous Infusions:  sodium chloride, 75 mL/hr, Last Rate: 75 mL/hr (05/23/23 1226)       PRN Meds:  •  acetaminophen  •  senna-docusate sodium **AND** polyethylene glycol **AND** bisacodyl **AND** bisacodyl  •  HYDROmorphone  •  Magnesium Standard Dose Replacement - Follow Nurse / BPA Driven Protocol  •  nitroglycerin  •  ondansetron **OR** ondansetron  •  oxyCODONE  •  [COMPLETED] Insert Peripheral IV **AND** sodium chloride  •  sodium chloride     ALLERGIES:    Allergies   Allergen Reactions   • Penicillins Hives     Objective    VITALS:   /90   Pulse (!) 127   Temp 98.1 °F (36.7 °C)   Resp 22   Ht 182.9 cm (72\")   Wt 66.7 kg (147 lb 0.8 oz)   SpO2 94%   BMI 19.94 kg/m²     PHYSICAL EXAM: (performed by MD)  Physical Exam  Constitutional:       General: He is not in acute distress.     Appearance: He is ill-appearing. He is not toxic-appearing or diaphoretic.   HENT:      Head: Normocephalic and atraumatic.      Right Ear: External ear normal.      Left Ear: External ear normal.      Nose: Nose normal.      Mouth/Throat:      Mouth: Mucous membranes are moist.      Pharynx: Oropharynx is clear.   Eyes:      General: No " scleral icterus.        Right eye: No discharge.         Left eye: No discharge.      Conjunctiva/sclera: Conjunctivae normal.      Pupils: Pupils are equal, round, and reactive to light.   Cardiovascular:      Rate and Rhythm: Normal rate and regular rhythm.      Pulses: Normal pulses.      Heart sounds: Normal heart sounds. No murmur heard.    No friction rub. No gallop.   Pulmonary:      Effort: No respiratory distress.      Breath sounds: No stridor. No wheezing, rhonchi or rales.   Chest:      Chest wall: No tenderness.   Abdominal:      General: Abdomen is flat. Bowel sounds are normal. There is no distension.      Palpations: Abdomen is soft. There is no mass.      Tenderness: There is no abdominal tenderness. There is no right CVA tenderness, left CVA tenderness, guarding or rebound.   Musculoskeletal:         General: No swelling, tenderness, deformity or signs of injury.      Cervical back: No rigidity.      Right lower leg: No edema.      Left lower leg: No edema.   Lymphadenopathy:      Cervical: No cervical adenopathy.   Skin:     General: Skin is warm and dry.      Coloration: Skin is not jaundiced.      Findings: No bruising or rash.   Neurological:      General: No focal deficit present.      Mental Status: He is alert and oriented to person, place, and time.      Gait: Gait normal.   Psychiatric:         Mood and Affect: Mood normal.         Behavior: Behavior normal.         Thought Content: Thought content normal.         Judgment: Judgment normal.     ARELIS Jacob MD performed the physical exam on 5/23/2023 as documented above.      RECENT LABS:  Lab Results (last 24 hours)     Procedure Component Value Units Date/Time    Magnesium [[id number]]  (Normal) Collected: 05/23/23 0248    Specimen: Blood from Arm, Right Updated: 05/23/23 0403     Magnesium 1.9 mg/dL     Comprehensive Metabolic Panel [[id number]]  (Abnormal) Collected: 05/23/23 0248    Specimen: Blood from Arm, Right Updated: 05/23/23  0403     Glucose 103 mg/dL      BUN 13 mg/dL      Creatinine 0.90 mg/dL      Sodium 133 mmol/L      Potassium 4.7 mmol/L      Comment: Slight hemolysis detected by analyzer. Results may be affected.        Chloride 95 mmol/L      CO2 24.0 mmol/L      Calcium 9.4 mg/dL      Total Protein 6.2 g/dL      Albumin 3.3 g/dL      ALT (SGPT) 41 U/L      AST (SGOT) 41 U/L      Alkaline Phosphatase 269 U/L      Total Bilirubin 0.7 mg/dL      Globulin 2.9 gm/dL      A/G Ratio 1.1 g/dL      BUN/Creatinine Ratio 14.4     Anion Gap 14.0 mmol/L      eGFR 90.7 mL/min/1.73     Narrative:      GFR Normal >60  Chronic Kidney Disease <60  Kidney Failure <15    The GFR formula is only valid for adults with stable renal function between ages 18 and 70.    Phosphorus [[id number]]  (Normal) Collected: 05/23/23 0248    Specimen: Blood from Arm, Right Updated: 05/23/23 0403     Phosphorus 2.9 mg/dL     Protime-INR [[id number]]  (Normal) Collected: 05/23/23 0248    Specimen: Blood from Arm, Right Updated: 05/23/23 0347     Protime 11.6 Seconds      INR 1.09    CBC & Differential [[id number]]  (Abnormal) Collected: 05/23/23 0248    Specimen: Blood from Arm, Right Updated: 05/23/23 0338    Narrative:      The following orders were created for panel order CBC & Differential.  Procedure                               Abnormality         Status                     ---------                               -----------         ------                     CBC Auto Differential[[id number]]        Abnormal            Final result                 Please view results for these tests on the individual orders.    CBC Auto Differential [[id number]]  (Abnormal) Collected: 05/23/23 0248    Specimen: Blood from Arm, Right Updated: 05/23/23 0338     WBC 7.80 10*3/mm3      RBC 4.05 10*6/mm3      Hemoglobin 11.7 g/dL      Hematocrit 36.0 %      MCV 88.9 fL      MCH 28.8 pg      MCHC 32.4 g/dL      RDW 12.9 %      RDW-SD 40.3 fl      MPV 7.7 fL      Platelets 330 10*3/mm3       Neutrophil % 75.1 %      Lymphocyte % 8.8 %      Monocyte % 14.3 %      Eosinophil % 1.0 %      Basophil % 0.8 %      Neutrophils, Absolute 5.90 10*3/mm3      Lymphocytes, Absolute 0.70 10*3/mm3      Monocytes, Absolute 1.10 10*3/mm3      Eosinophils, Absolute 0.10 10*3/mm3      Basophils, Absolute 0.10 10*3/mm3      nRBC 0.0 /100 WBC         IMAGING REVIEWED:  No radiology results for the last day    Assessment & Plan   ASSESSMENT:  1. Metastatic malignancy. To undergo endoscopies. Awaiting the liver biopsy which has been arranged for 5/25/2023. Discussed with him and his spouse.     PLAN:  1. As above.     Jonathan Jacob MD on 5/23/2023 at 13:20

## 2023-05-23 NOTE — PLAN OF CARE
Goal Outcome Evaluation:  Plan of Care Reviewed With: patient        Progress: no change  Outcome Evaluation: Pt is stable and abed. Bowel prep started. Pain controlled with medication. Will continue to monitor.

## 2023-05-23 NOTE — NURSING NOTE
Patient confused throughout the morning. Wife at bedside. Patient pulling on wires and IV. RN redirected patient to the room. RN notified Cassie MELISSA.

## 2023-05-24 ENCOUNTER — INPATIENT HOSPITAL (OUTPATIENT)
Dept: URBAN - METROPOLITAN AREA HOSPITAL 84 | Facility: HOSPITAL | Age: 73
End: 2023-05-24

## 2023-05-24 ENCOUNTER — ANESTHESIA (OUTPATIENT)
Dept: PERIOP | Facility: HOSPITAL | Age: 73
End: 2023-05-24
Payer: MEDICARE

## 2023-05-24 ENCOUNTER — APPOINTMENT (OUTPATIENT)
Dept: CT IMAGING | Facility: HOSPITAL | Age: 73
End: 2023-05-24
Payer: MEDICARE

## 2023-05-24 ENCOUNTER — ANESTHESIA EVENT (OUTPATIENT)
Dept: PERIOP | Facility: HOSPITAL | Age: 73
End: 2023-05-24
Payer: MEDICARE

## 2023-05-24 DIAGNOSIS — K62.4 STENOSIS OF ANUS AND RECTUM: ICD-10-CM

## 2023-05-24 DIAGNOSIS — R93.3 ABNORMAL FINDINGS ON DIAGNOSTIC IMAGING OF OTHER PARTS OF DI: ICD-10-CM

## 2023-05-24 DIAGNOSIS — R63.0 ANOREXIA: ICD-10-CM

## 2023-05-24 DIAGNOSIS — R43.8 OTHER DISTURBANCES OF SMELL AND TASTE: ICD-10-CM

## 2023-05-24 DIAGNOSIS — K59.00 CONSTIPATION, UNSPECIFIED: ICD-10-CM

## 2023-05-24 DIAGNOSIS — C78.7 SECONDARY MALIGNANT NEOPLASM OF LIVER AND INTRAHEPATIC BILE: ICD-10-CM

## 2023-05-24 DIAGNOSIS — C48.1 MALIGNANT NEOPLASM OF SPECIFIED PARTS OF PERITONEUM: ICD-10-CM

## 2023-05-24 DIAGNOSIS — R74.01 ELEVATION OF LEVELS OF LIVER TRANSAMINASE LEVELS: ICD-10-CM

## 2023-05-24 DIAGNOSIS — R18.8 OTHER ASCITES: ICD-10-CM

## 2023-05-24 DIAGNOSIS — R10.9 UNSPECIFIED ABDOMINAL PAIN: ICD-10-CM

## 2023-05-24 PROBLEM — K56.609 COLON OBSTRUCTION: Status: ACTIVE | Noted: 2023-05-21

## 2023-05-24 LAB
ANION GAP SERPL CALCULATED.3IONS-SCNC: 12 MMOL/L (ref 5–15)
BASOPHILS # BLD AUTO: 0 10*3/MM3 (ref 0–0.2)
BASOPHILS NFR BLD AUTO: 0.2 % (ref 0–1.5)
BUN SERPL-MCNC: 18 MG/DL (ref 8–23)
BUN/CREAT SERPL: 22.5 (ref 7–25)
CALCIUM SPEC-SCNC: 8.9 MG/DL (ref 8.6–10.5)
CHLORIDE SERPL-SCNC: 101 MMOL/L (ref 98–107)
CO2 SERPL-SCNC: 22 MMOL/L (ref 22–29)
CREAT SERPL-MCNC: 0.8 MG/DL (ref 0.76–1.27)
DEPRECATED RDW RBC AUTO: 42.9 FL (ref 37–54)
EGFRCR SERPLBLD CKD-EPI 2021: 94 ML/MIN/1.73
EOSINOPHIL # BLD AUTO: 0 10*3/MM3 (ref 0–0.4)
EOSINOPHIL NFR BLD AUTO: 0 % (ref 0.3–6.2)
ERYTHROCYTE [DISTWIDTH] IN BLOOD BY AUTOMATED COUNT: 13.3 % (ref 12.3–15.4)
GLUCOSE SERPL-MCNC: 184 MG/DL (ref 65–99)
HCT VFR BLD AUTO: 32.3 % (ref 37.5–51)
HGB BLD-MCNC: 11 G/DL (ref 13–17.7)
HOLD SPECIMEN: NORMAL
LYMPHOCYTES # BLD AUTO: 0.3 10*3/MM3 (ref 0.7–3.1)
LYMPHOCYTES NFR BLD AUTO: 1.8 % (ref 19.6–45.3)
MCH RBC QN AUTO: 29.6 PG (ref 26.6–33)
MCHC RBC AUTO-ENTMCNC: 34 G/DL (ref 31.5–35.7)
MCV RBC AUTO: 87.1 FL (ref 79–97)
MONOCYTES # BLD AUTO: 0.5 10*3/MM3 (ref 0.1–0.9)
MONOCYTES NFR BLD AUTO: 3.3 % (ref 5–12)
MRSA DNA SPEC QL NAA+PROBE: NORMAL
NEUTROPHILS NFR BLD AUTO: 13.2 10*3/MM3 (ref 1.7–7)
NEUTROPHILS NFR BLD AUTO: 94.7 % (ref 42.7–76)
NRBC BLD AUTO-RTO: 0 /100 WBC (ref 0–0.2)
PLATELET # BLD AUTO: 281 10*3/MM3 (ref 140–450)
PMV BLD AUTO: 7.7 FL (ref 6–12)
POTASSIUM SERPL-SCNC: 4 MMOL/L (ref 3.5–5.2)
RBC # BLD AUTO: 3.71 10*6/MM3 (ref 4.14–5.8)
SODIUM SERPL-SCNC: 135 MMOL/L (ref 136–145)
WBC NRBC COR # BLD: 14 10*3/MM3 (ref 3.4–10.8)

## 2023-05-24 PROCEDURE — 36415 COLL VENOUS BLD VENIPUNCTURE: CPT | Performed by: SURGERY

## 2023-05-24 PROCEDURE — 0 CEFEPIME PER 500 MG: Performed by: SURGERY

## 2023-05-24 PROCEDURE — 88341 IMHCHEM/IMCYTCHM EA ADD ANTB: CPT | Performed by: INTERNAL MEDICINE

## 2023-05-24 PROCEDURE — 25010000002 FENTANYL CITRATE (PF) 100 MCG/2ML SOLUTION: Performed by: NURSE ANESTHETIST, CERTIFIED REGISTERED

## 2023-05-24 PROCEDURE — 88342 IMHCHEM/IMCYTCHM 1ST ANTB: CPT | Performed by: INTERNAL MEDICINE

## 2023-05-24 PROCEDURE — 25010000002 MAGNESIUM SULFATE 2 GM/50ML SOLUTION: Performed by: INTERNAL MEDICINE

## 2023-05-24 PROCEDURE — 25010000002 MIDAZOLAM PER 1 MG: Performed by: NURSE ANESTHETIST, CERTIFIED REGISTERED

## 2023-05-24 PROCEDURE — 25010000002 VANCOMYCIN 750 MG RECONSTITUTED SOLUTION 1 EACH VIAL: Performed by: SURGERY

## 2023-05-24 PROCEDURE — 25010000002 VANCOMYCIN 750 MG RECONSTITUTED SOLUTION 1 EACH VIAL: Performed by: NURSE PRACTITIONER

## 2023-05-24 PROCEDURE — 25010000002 HYDROMORPHONE 1 MG/ML SOLUTION: Performed by: INTERNAL MEDICINE

## 2023-05-24 PROCEDURE — 83735 ASSAY OF MAGNESIUM: CPT | Performed by: SURGERY

## 2023-05-24 PROCEDURE — 76942 ECHO GUIDE FOR BIOPSY: CPT

## 2023-05-24 PROCEDURE — 99152 MOD SED SAME PHYS/QHP 5/>YRS: CPT

## 2023-05-24 PROCEDURE — 25010000002 MIDAZOLAM PER 1 MG: Performed by: RADIOLOGY

## 2023-05-24 PROCEDURE — 0 CEFEPIME PER 500 MG: Performed by: NURSE PRACTITIONER

## 2023-05-24 PROCEDURE — 88333 PATH CONSLTJ SURG CYTO XM 1: CPT | Performed by: INTERNAL MEDICINE

## 2023-05-24 PROCEDURE — 25010000002 ALBUMIN HUMAN 5% PER 50 ML: Performed by: NURSE ANESTHETIST, CERTIFIED REGISTERED

## 2023-05-24 PROCEDURE — 87641 MR-STAPH DNA AMP PROBE: CPT | Performed by: NURSE PRACTITIONER

## 2023-05-24 PROCEDURE — 25010000002 PHENYLEPHRINE 10 MG/ML SOLUTION 5 ML VIAL: Performed by: NURSE ANESTHETIST, CERTIFIED REGISTERED

## 2023-05-24 PROCEDURE — 0 LIDOCAINE 1 % SOLUTION: Performed by: RADIOLOGY

## 2023-05-24 PROCEDURE — 25010000002 MORPHINE PER 10 MG: Performed by: SURGERY

## 2023-05-24 PROCEDURE — 25010000002 HYDROMORPHONE 1 MG/ML SOLUTION: Performed by: NURSE ANESTHETIST, CERTIFIED REGISTERED

## 2023-05-24 PROCEDURE — P9041 ALBUMIN (HUMAN),5%, 50ML: HCPCS | Performed by: NURSE ANESTHETIST, CERTIFIED REGISTERED

## 2023-05-24 PROCEDURE — 88307 TISSUE EXAM BY PATHOLOGIST: CPT | Performed by: INTERNAL MEDICINE

## 2023-05-24 PROCEDURE — 25010000002 DEXAMETHASONE PER 1 MG: Performed by: NURSE ANESTHETIST, CERTIFIED REGISTERED

## 2023-05-24 PROCEDURE — 88342 IMHCHEM/IMCYTCHM 1ST ANTB: CPT | Performed by: SURGERY

## 2023-05-24 PROCEDURE — 0DBU0ZX EXCISION OF OMENTUM, OPEN APPROACH, DIAGNOSTIC: ICD-10-PCS | Performed by: SURGERY

## 2023-05-24 PROCEDURE — 84100 ASSAY OF PHOSPHORUS: CPT | Performed by: SURGERY

## 2023-05-24 PROCEDURE — 0FB03ZX EXCISION OF LIVER, PERCUTANEOUS APPROACH, DIAGNOSTIC: ICD-10-PCS | Performed by: FAMILY MEDICINE

## 2023-05-24 PROCEDURE — 25010000002 SUGAMMADEX 200 MG/2ML SOLUTION: Performed by: NURSE ANESTHETIST, CERTIFIED REGISTERED

## 2023-05-24 PROCEDURE — 80048 BASIC METABOLIC PNL TOTAL CA: CPT | Performed by: SURGERY

## 2023-05-24 PROCEDURE — 99233 SBSQ HOSP IP/OBS HIGH 50: CPT | Performed by: NURSE PRACTITIONER

## 2023-05-24 PROCEDURE — 0D1B0Z4 BYPASS ILEUM TO CUTANEOUS, OPEN APPROACH: ICD-10-PCS | Performed by: SURGERY

## 2023-05-24 PROCEDURE — 25010000002 FENTANYL CITRATE (PF) 50 MCG/ML SOLUTION: Performed by: RADIOLOGY

## 2023-05-24 PROCEDURE — 0DJD0ZZ INSPECTION OF LOWER INTESTINAL TRACT, OPEN APPROACH: ICD-10-PCS | Performed by: SURGERY

## 2023-05-24 PROCEDURE — 25010000002 ONDANSETRON PER 1 MG: Performed by: NURSE ANESTHETIST, CERTIFIED REGISTERED

## 2023-05-24 PROCEDURE — 25010000002 ONDANSETRON PER 1 MG: Performed by: RADIOLOGY

## 2023-05-24 PROCEDURE — 0DBP8ZX EXCISION OF RECTUM, VIA NATURAL OR ARTIFICIAL OPENING ENDOSCOPIC, DIAGNOSTIC: ICD-10-PCS | Performed by: FAMILY MEDICINE

## 2023-05-24 PROCEDURE — 88305 TISSUE EXAM BY PATHOLOGIST: CPT | Performed by: SURGERY

## 2023-05-24 PROCEDURE — 85025 COMPLETE CBC W/AUTO DIFF WBC: CPT | Performed by: SURGERY

## 2023-05-24 PROCEDURE — 25010000002 CEFOXITIN PER 1 G: Performed by: SURGERY

## 2023-05-24 PROCEDURE — 25010000002 PROPOFOL 1000 MG/100ML EMULSION: Performed by: NURSE ANESTHETIST, CERTIFIED REGISTERED

## 2023-05-24 DEVICE — ABSORBABLE HEMOSTAT (OXIDIZED REGENERATED CELLULOSE, U.S.P.)
Type: IMPLANTABLE DEVICE | Site: ABDOMEN | Status: FUNCTIONAL
Brand: SURGICEL

## 2023-05-24 RX ORDER — HYDRALAZINE HYDROCHLORIDE 20 MG/ML
5 INJECTION INTRAMUSCULAR; INTRAVENOUS
Status: DISCONTINUED | OUTPATIENT
Start: 2023-05-24 | End: 2023-05-24 | Stop reason: HOSPADM

## 2023-05-24 RX ORDER — DIPHENHYDRAMINE HYDROCHLORIDE 50 MG/ML
12.5 INJECTION INTRAMUSCULAR; INTRAVENOUS
Status: DISCONTINUED | OUTPATIENT
Start: 2023-05-24 | End: 2023-05-24 | Stop reason: HOSPADM

## 2023-05-24 RX ORDER — FENTANYL CITRATE 50 UG/ML
50 INJECTION, SOLUTION INTRAMUSCULAR; INTRAVENOUS
Status: DISCONTINUED | OUTPATIENT
Start: 2023-05-24 | End: 2023-05-24 | Stop reason: HOSPADM

## 2023-05-24 RX ORDER — LIDOCAINE HYDROCHLORIDE 10 MG/ML
INJECTION, SOLUTION INFILTRATION; PERINEURAL AS NEEDED
Status: COMPLETED | OUTPATIENT
Start: 2023-05-24 | End: 2023-05-24

## 2023-05-24 RX ORDER — ONDANSETRON 2 MG/ML
INJECTION INTRAMUSCULAR; INTRAVENOUS AS NEEDED
Status: COMPLETED | OUTPATIENT
Start: 2023-05-24 | End: 2023-05-24

## 2023-05-24 RX ORDER — NALBUPHINE HYDROCHLORIDE 10 MG/ML
10 INJECTION, SOLUTION INTRAMUSCULAR; INTRAVENOUS; SUBCUTANEOUS EVERY 4 HOURS PRN
Status: DISCONTINUED | OUTPATIENT
Start: 2023-05-24 | End: 2023-05-24 | Stop reason: HOSPADM

## 2023-05-24 RX ORDER — PROMETHAZINE HYDROCHLORIDE 25 MG/1
25 TABLET ORAL ONCE AS NEEDED
Status: DISCONTINUED | OUTPATIENT
Start: 2023-05-24 | End: 2023-05-24 | Stop reason: HOSPADM

## 2023-05-24 RX ORDER — KETAMINE HCL IN NACL, ISO-OSM 100MG/10ML
SYRINGE (ML) INJECTION AS NEEDED
Status: DISCONTINUED | OUTPATIENT
Start: 2023-05-24 | End: 2023-05-24 | Stop reason: SURG

## 2023-05-24 RX ORDER — DEXAMETHASONE SODIUM PHOSPHATE 4 MG/ML
INJECTION, SOLUTION INTRA-ARTICULAR; INTRALESIONAL; INTRAMUSCULAR; INTRAVENOUS; SOFT TISSUE AS NEEDED
Status: DISCONTINUED | OUTPATIENT
Start: 2023-05-24 | End: 2023-05-24 | Stop reason: SURG

## 2023-05-24 RX ORDER — FLUMAZENIL 0.1 MG/ML
0.2 INJECTION INTRAVENOUS AS NEEDED
Status: DISCONTINUED | OUTPATIENT
Start: 2023-05-24 | End: 2023-05-24 | Stop reason: HOSPADM

## 2023-05-24 RX ORDER — ROCURONIUM BROMIDE 10 MG/ML
INJECTION, SOLUTION INTRAVENOUS AS NEEDED
Status: DISCONTINUED | OUTPATIENT
Start: 2023-05-24 | End: 2023-05-24 | Stop reason: SURG

## 2023-05-24 RX ORDER — LIDOCAINE HYDROCHLORIDE 20 MG/ML
INJECTION, SOLUTION EPIDURAL; INFILTRATION; INTRACAUDAL; PERINEURAL AS NEEDED
Status: DISCONTINUED | OUTPATIENT
Start: 2023-05-24 | End: 2023-05-24 | Stop reason: SURG

## 2023-05-24 RX ORDER — MIDAZOLAM HYDROCHLORIDE 1 MG/ML
INJECTION INTRAMUSCULAR; INTRAVENOUS AS NEEDED
Status: COMPLETED | OUTPATIENT
Start: 2023-05-24 | End: 2023-05-24

## 2023-05-24 RX ORDER — MORPHINE SULFATE 2 MG/ML
2 INJECTION, SOLUTION INTRAMUSCULAR; INTRAVENOUS
Status: DISCONTINUED | OUTPATIENT
Start: 2023-05-24 | End: 2023-05-26 | Stop reason: HOSPADM

## 2023-05-24 RX ORDER — EPHEDRINE SULFATE 5 MG/ML
5 INJECTION INTRAVENOUS ONCE AS NEEDED
Status: DISCONTINUED | OUTPATIENT
Start: 2023-05-24 | End: 2023-05-24 | Stop reason: HOSPADM

## 2023-05-24 RX ORDER — METOPROLOL TARTRATE 5 MG/5ML
INJECTION INTRAVENOUS AS NEEDED
Status: DISCONTINUED | OUTPATIENT
Start: 2023-05-24 | End: 2023-05-24 | Stop reason: SURG

## 2023-05-24 RX ORDER — NALOXONE HCL 0.4 MG/ML
0.4 VIAL (ML) INJECTION AS NEEDED
Status: DISCONTINUED | OUTPATIENT
Start: 2023-05-24 | End: 2023-05-24 | Stop reason: HOSPADM

## 2023-05-24 RX ORDER — DEXAMETHASONE SODIUM PHOSPHATE 4 MG/ML
8 INJECTION, SOLUTION INTRA-ARTICULAR; INTRALESIONAL; INTRAMUSCULAR; INTRAVENOUS; SOFT TISSUE ONCE AS NEEDED
Status: DISCONTINUED | OUTPATIENT
Start: 2023-05-24 | End: 2023-05-24 | Stop reason: HOSPADM

## 2023-05-24 RX ORDER — IPRATROPIUM BROMIDE AND ALBUTEROL SULFATE 2.5; .5 MG/3ML; MG/3ML
3 SOLUTION RESPIRATORY (INHALATION) ONCE AS NEEDED
Status: DISCONTINUED | OUTPATIENT
Start: 2023-05-24 | End: 2023-05-24 | Stop reason: HOSPADM

## 2023-05-24 RX ORDER — ONDANSETRON 2 MG/ML
INJECTION INTRAMUSCULAR; INTRAVENOUS AS NEEDED
Status: DISCONTINUED | OUTPATIENT
Start: 2023-05-24 | End: 2023-05-24 | Stop reason: SURG

## 2023-05-24 RX ORDER — LABETALOL HYDROCHLORIDE 5 MG/ML
5 INJECTION, SOLUTION INTRAVENOUS
Status: DISCONTINUED | OUTPATIENT
Start: 2023-05-24 | End: 2023-05-24 | Stop reason: HOSPADM

## 2023-05-24 RX ORDER — PROMETHAZINE HYDROCHLORIDE 25 MG/1
25 SUPPOSITORY RECTAL ONCE AS NEEDED
Status: DISCONTINUED | OUTPATIENT
Start: 2023-05-24 | End: 2023-05-24 | Stop reason: HOSPADM

## 2023-05-24 RX ORDER — NALBUPHINE HYDROCHLORIDE 10 MG/ML
2 INJECTION, SOLUTION INTRAMUSCULAR; INTRAVENOUS; SUBCUTANEOUS EVERY 4 HOURS PRN
Status: DISCONTINUED | OUTPATIENT
Start: 2023-05-24 | End: 2023-05-24 | Stop reason: HOSPADM

## 2023-05-24 RX ORDER — MEPERIDINE HYDROCHLORIDE 25 MG/ML
12.5 INJECTION INTRAMUSCULAR; INTRAVENOUS; SUBCUTANEOUS
Status: DISCONTINUED | OUTPATIENT
Start: 2023-05-24 | End: 2023-05-24 | Stop reason: HOSPADM

## 2023-05-24 RX ORDER — PROCHLORPERAZINE EDISYLATE 5 MG/ML
10 INJECTION INTRAMUSCULAR; INTRAVENOUS ONCE AS NEEDED
Status: DISCONTINUED | OUTPATIENT
Start: 2023-05-24 | End: 2023-05-24 | Stop reason: HOSPADM

## 2023-05-24 RX ORDER — PROPOFOL 10 MG/ML
INJECTION, EMULSION INTRAVENOUS AS NEEDED
Status: DISCONTINUED | OUTPATIENT
Start: 2023-05-24 | End: 2023-05-24 | Stop reason: SURG

## 2023-05-24 RX ORDER — ONDANSETRON 2 MG/ML
4 INJECTION INTRAMUSCULAR; INTRAVENOUS ONCE AS NEEDED
Status: DISCONTINUED | OUTPATIENT
Start: 2023-05-24 | End: 2023-05-24 | Stop reason: HOSPADM

## 2023-05-24 RX ORDER — FENTANYL CITRATE 50 UG/ML
INJECTION, SOLUTION INTRAMUSCULAR; INTRAVENOUS AS NEEDED
Status: COMPLETED | OUTPATIENT
Start: 2023-05-24 | End: 2023-05-24

## 2023-05-24 RX ORDER — MIDAZOLAM HYDROCHLORIDE 1 MG/ML
INJECTION INTRAMUSCULAR; INTRAVENOUS AS NEEDED
Status: DISCONTINUED | OUTPATIENT
Start: 2023-05-24 | End: 2023-05-24 | Stop reason: SURG

## 2023-05-24 RX ORDER — FENTANYL CITRATE 50 UG/ML
INJECTION, SOLUTION INTRAMUSCULAR; INTRAVENOUS AS NEEDED
Status: DISCONTINUED | OUTPATIENT
Start: 2023-05-24 | End: 2023-05-24 | Stop reason: SURG

## 2023-05-24 RX ORDER — ALBUMIN, HUMAN INJ 5% 5 %
SOLUTION INTRAVENOUS CONTINUOUS PRN
Status: DISCONTINUED | OUTPATIENT
Start: 2023-05-24 | End: 2023-05-24 | Stop reason: SURG

## 2023-05-24 RX ADMIN — MAGNESIUM SULFATE HEPTAHYDRATE 2 G: 2 INJECTION, SOLUTION INTRAVENOUS at 05:07

## 2023-05-24 RX ADMIN — FENTANYL CITRATE 50 MCG: 50 INJECTION, SOLUTION INTRAMUSCULAR; INTRAVENOUS at 16:38

## 2023-05-24 RX ADMIN — GELATIN ABSORBABLE SPONGE 12-7 MM 1 EACH: 12-7 MISC at 10:54

## 2023-05-24 RX ADMIN — ONDANSETRON 4 MG: 2 INJECTION INTRAMUSCULAR; INTRAVENOUS at 17:05

## 2023-05-24 RX ADMIN — SODIUM CHLORIDE 500 ML: 9 INJECTION, SOLUTION INTRAVENOUS at 02:12

## 2023-05-24 RX ADMIN — ONDANSETRON 4 MG: 2 INJECTION INTRAMUSCULAR; INTRAVENOUS at 10:45

## 2023-05-24 RX ADMIN — METOPROLOL TARTRATE 2.5 MG: 1 INJECTION, SOLUTION INTRAVENOUS at 16:44

## 2023-05-24 RX ADMIN — METOPROLOL TARTRATE 50 MG: 50 TABLET ORAL at 21:54

## 2023-05-24 RX ADMIN — FENTANYL CITRATE 50 MCG: 50 INJECTION, SOLUTION INTRAMUSCULAR; INTRAVENOUS at 10:48

## 2023-05-24 RX ADMIN — FENTANYL CITRATE 50 MCG: 50 INJECTION, SOLUTION INTRAMUSCULAR; INTRAVENOUS at 16:19

## 2023-05-24 RX ADMIN — FENTANYL CITRATE 50 MCG: 50 INJECTION, SOLUTION INTRAMUSCULAR; INTRAVENOUS at 10:46

## 2023-05-24 RX ADMIN — HYDROMORPHONE HYDROCHLORIDE 1 MG: 1 INJECTION, SOLUTION INTRAMUSCULAR; INTRAVENOUS; SUBCUTANEOUS at 17:22

## 2023-05-24 RX ADMIN — DEXAMETHASONE SODIUM PHOSPHATE 4 MG: 4 INJECTION, SOLUTION INTRAMUSCULAR; INTRAVENOUS at 17:05

## 2023-05-24 RX ADMIN — ALBUMIN HUMAN: 0.05 INJECTION, SOLUTION INTRAVENOUS at 16:30

## 2023-05-24 RX ADMIN — Medication 15 MG: at 16:38

## 2023-05-24 RX ADMIN — PROPOFOL INJECTABLE EMULSION 50 MG: 10 INJECTION, EMULSION INTRAVENOUS at 17:02

## 2023-05-24 RX ADMIN — MIDAZOLAM 0.5 MG: 1 INJECTION INTRAMUSCULAR; INTRAVENOUS at 10:46

## 2023-05-24 RX ADMIN — Medication 10 ML: at 08:15

## 2023-05-24 RX ADMIN — VANCOMYCIN HYDROCHLORIDE 750 MG: 750 INJECTION, POWDER, LYOPHILIZED, FOR SOLUTION INTRAVENOUS at 23:42

## 2023-05-24 RX ADMIN — VANCOMYCIN HYDROCHLORIDE 750 MG: 750 INJECTION, POWDER, LYOPHILIZED, FOR SOLUTION INTRAVENOUS at 12:38

## 2023-05-24 RX ADMIN — LIDOCAINE HYDROCHLORIDE 100 MG: 20 INJECTION, SOLUTION EPIDURAL; INFILTRATION; INTRACAUDAL; PERINEURAL at 16:19

## 2023-05-24 RX ADMIN — PROPOFOL INJECTABLE EMULSION 50 MG: 10 INJECTION, EMULSION INTRAVENOUS at 17:09

## 2023-05-24 RX ADMIN — SODIUM CHLORIDE 700 ML: 9 INJECTION, SOLUTION INTRAVENOUS at 16:11

## 2023-05-24 RX ADMIN — LIDOCAINE HYDROCHLORIDE 10 ML: 10 INJECTION, SOLUTION INFILTRATION; PERINEURAL at 10:48

## 2023-05-24 RX ADMIN — Medication 35 MG: at 16:19

## 2023-05-24 RX ADMIN — CEFEPIME 2 G: 2 INJECTION, POWDER, FOR SOLUTION INTRAVENOUS at 14:36

## 2023-05-24 RX ADMIN — PHENYLEPHRINE HYDROCHLORIDE 0.2 MCG/KG/MIN: 10 INJECTION INTRAVENOUS at 16:26

## 2023-05-24 RX ADMIN — PROPOFOL INJECTABLE EMULSION 50 MG: 10 INJECTION, EMULSION INTRAVENOUS at 17:25

## 2023-05-24 RX ADMIN — HYDROMORPHONE HYDROCHLORIDE 0.5 MG: 1 INJECTION, SOLUTION INTRAMUSCULAR; INTRAVENOUS; SUBCUTANEOUS at 18:23

## 2023-05-24 RX ADMIN — SODIUM CHLORIDE 100 ML/HR: 9 INJECTION, SOLUTION INTRAVENOUS at 16:15

## 2023-05-24 RX ADMIN — MAGNESIUM SULFATE HEPTAHYDRATE 2 G: 2 INJECTION, SOLUTION INTRAVENOUS at 02:03

## 2023-05-24 RX ADMIN — CEFOXITIN 2 G: 2 INJECTION, POWDER, FOR SOLUTION INTRAVENOUS at 16:22

## 2023-05-24 RX ADMIN — MIDAZOLAM 0.5 MG: 1 INJECTION INTRAMUSCULAR; INTRAVENOUS at 10:48

## 2023-05-24 RX ADMIN — PROPOFOL INJECTABLE EMULSION 50 MG: 10 INJECTION, EMULSION INTRAVENOUS at 17:16

## 2023-05-24 RX ADMIN — MORPHINE SULFATE 2 MG: 2 INJECTION, SOLUTION INTRAMUSCULAR; INTRAVENOUS at 23:43

## 2023-05-24 RX ADMIN — ROCURONIUM BROMIDE 50 MG: 10 INJECTION, SOLUTION INTRAVENOUS at 16:19

## 2023-05-24 RX ADMIN — CEFEPIME 2 G: 2 INJECTION, POWDER, FOR SOLUTION INTRAVENOUS at 23:02

## 2023-05-24 RX ADMIN — SUGAMMADEX 200 MG: 100 INJECTION, SOLUTION INTRAVENOUS at 17:31

## 2023-05-24 RX ADMIN — Medication 10 ML: at 20:28

## 2023-05-24 RX ADMIN — MORPHINE SULFATE 2 MG: 2 INJECTION, SOLUTION INTRAMUSCULAR; INTRAVENOUS at 20:28

## 2023-05-24 RX ADMIN — CEFEPIME 2 G: 2 INJECTION, POWDER, FOR SOLUTION INTRAVENOUS at 08:11

## 2023-05-24 RX ADMIN — PROPOFOL INJECTABLE EMULSION 150 MCG/KG/MIN: 10 INJECTION, EMULSION INTRAVENOUS at 16:26

## 2023-05-24 RX ADMIN — PROPOFOL INJECTABLE EMULSION 100 MG: 10 INJECTION, EMULSION INTRAVENOUS at 16:19

## 2023-05-24 RX ADMIN — MIDAZOLAM 2 MG: 1 INJECTION INTRAMUSCULAR; INTRAVENOUS at 16:13

## 2023-05-24 NOTE — OP NOTE
Operative Report:    Patient Name:  Cali Cavazos  YOB: 1950    Date of Surgery:  2023     Indications: 72-year-old gentleman who presented to hospital with constipation and was found on CT scan to have evidence of peritoneal carcinomatosis.  He underwent endoscopic evaluation of the rectum was found to have obstruction only allowing the passage of a  scope through.  I was asked to see him for colostomy due to his rectal obstruction.  Counseled him and his family about the risk and the benefits of exploratory laparotomy and ostomy formation they understood the risk and wished to proceed.    Pre-op Diagnosis:   Colon obstruction [K56.609]       Post-Op Diagnosis Codes:     * Colon obstruction [K56.609]    Procedure/CPT® Codes:      Procedure(s):  Exploratory laparotomy with lysis of adhesion  Omental biopsy  Loop ileostomy    Staff:  Surgeon(s):  Floyd Ambriz MD    Circulator: Miranda Toledo RN  Scrub Person: Sridevi Zabala; Anahi Ramos  Assistant: Rebecca Dhillon RN CSA  was responsible for performing the following activities: Retraction and Closing and their skilled assistance was necessary for the success of this case.        Anesthesia: General    Estimated Blood Loss: minimal    Implants:    Implant Name Type Inv. Item Serial No.  Lot No. LRB No. Used Action   HEMOST ABS SURGICEL 2X14 - [id number] Implant HEMOST ABS SURGICEL 2X14  [employer] J [id number] N/A 1 Implanted       Specimen:          Specimens     ID Source Type Tests Collected By Collected At Frozen?    A Abdominal Wall Body Fluid · NON-GYNECOLOGIC CYTOLOGY   Floyd Ambriz MD 23 1637     Description: fluid from abdomen    B Omentum Tissue · TISSUE PATHOLOGY EXAM   Floyd Ambriz MD 23 1640     Description: omentum biopsy              Findings: Severe peritoneal carcinomatosis thickening of all peritoneal surfaces numerous uncountable nodularity at the root of all the  mesentery.  The omentum was completely replaced by tumor completely encasing the transverse colon.  The a sending and descending colon's were encased in tumor in the retroperitoneum the sigmoid colon was encased in tumor in the lower abdomen and cannot be freed up for colostomy.    Complications: None    Description of Procedure: Patient was taken the operating placed on the operating table in supine position under general anesthesia.  His abdomen was prepped and draped normal sterile fashion.  Timeout was performed identifying correct patient procedure site of operation.  I began the operation by making a midline laparotomy incision upon entry the abdominal cavity there was about 2 L of ascites which were evacuated.  A sample was taken and sent to pathology for cytology.  Upon brief inspection of the abdominal cavity he had firm nodularity of the entire omentum that encased the transverse colon.  There were uncountable nodules throughout the root of all the small bowel mesentery.  The peritoneal surfaces were all thickened and the ascending and descending colon were completely encased in the retroperitoneum by tumor.  The sigmoid colon was densely adherent to the peritoneal surfaces by tumor.  I was able to run the small bowel somewhat from the more proximal small bowel to the more distal small bowel before it dove down into the pelvis.  Based on the findings of obstruction on the endoscopy I decided since I could not bring up a colostomy to bring up a loop ileostomy in the right lower quadrant.  This was done the most distal position I could and really the only area of bowel that would even reach the abdominal wall.  I excised out a sweat the skin in the right lower quadrant incised the anterior posterior rectus sheath and spread the muscle.  There was some bleeding from the rectus muscle which was stopped with the Bovie.  I then pulled the bowel through this site and there is not any significant tension on the  bowel itself.  The bowel was edematous and did not appear very healthy.  I sampled the omentum and sent to pathology for review.  Then inspected hemostasis had been achieved.  The abdomen was closed with #1 looped PDS and skin staples.  I then matured the ileostomy creating a window in the mesentery which caused some venous bleeding.  This was stopped with 3-0 silk sutures but ultimately had to apply some Surgicel to help with oozing.  I then put bridge through this mesenteric defect to help him for the loop ileostomy.  I then matured in a Wendy fashion using erupted 3-0 Vicryl suture.  The bowel itself was dusky and there was some hemorrhagic fluid that was evacuated when I opened it up to matured the ostomy.  Dome appliance was applied.  He was woke from anesthesia transferred to recovery in satisfactory condition the counts were correct the end of the case.      Floyd Ambriz MD     Date: 5/24/2023  Time: 17:54 EDT    This note was created using Dragon Voice Recognition software.

## 2023-05-24 NOTE — PLAN OF CARE
Problem: Adult Inpatient Plan of Care  Goal: Plan of Care Review  Outcome: Ongoing, Progressing  Flowsheets (Taken 5/24/2023 1528)  Progress: no change  Plan of Care Reviewed With: patient  Outcome Evaluation: Pt had liver biposy today in IR. Pt having colostomy placed in OR now. Pt more oriented later in the shift. Will continue to monitor.  Goal: Patient-Specific Goal (Individualized)  Outcome: Ongoing, Progressing  Goal: Absence of Hospital-Acquired Illness or Injury  Outcome: Ongoing, Progressing  Intervention: Identify and Manage Fall Risk  Recent Flowsheet Documentation  Taken 5/24/2023 1400 by Emily Her RN  Safety Promotion/Fall Prevention:   safety round/check completed   nonskid shoes/slippers when out of bed  Taken 5/24/2023 1202 by Emily Her RN  Safety Promotion/Fall Prevention:   safety round/check completed   nonskid shoes/slippers when out of bed  Taken 5/24/2023 1000 by Emily Her RN  Safety Promotion/Fall Prevention:   safety round/check completed   nonskid shoes/slippers when out of bed  Taken 5/24/2023 0800 by Emily Her RN  Safety Promotion/Fall Prevention:   safety round/check completed   nonskid shoes/slippers when out of bed  Intervention: Prevent and Manage VTE (Venous Thromboembolism) Risk  Recent Flowsheet Documentation  Taken 5/24/2023 1250 by Emily Her RN  VTE Prevention/Management:   bilateral   sequential compression devices on  Goal: Optimal Comfort and Wellbeing  Outcome: Ongoing, Progressing  Goal: Readiness for Transition of Care  Outcome: Ongoing, Progressing     Problem: Skin Injury Risk Increased  Goal: Skin Health and Integrity  Outcome: Ongoing, Progressing  Intervention: Optimize Skin Protection  Recent Flowsheet Documentation  Taken 5/24/2023 0800 by Emily Her RN  Pressure Reduction Techniques: frequent weight shift encouraged     Problem: Malnutrition  Goal: Improved Nutritional Intake  Outcome: Ongoing, Progressing     Problem: Fall Injury Risk  Goal:  Absence of Fall and Fall-Related Injury  Outcome: Ongoing, Progressing  Intervention: Promote Injury-Free Environment  Recent Flowsheet Documentation  Taken 5/24/2023 1400 by Emily Her RN  Safety Promotion/Fall Prevention:   safety round/check completed   nonskid shoes/slippers when out of bed  Taken 5/24/2023 1202 by Emily Her RN  Safety Promotion/Fall Prevention:   safety round/check completed   nonskid shoes/slippers when out of bed  Taken 5/24/2023 1000 by Emily Her RN  Safety Promotion/Fall Prevention:   safety round/check completed   nonskid shoes/slippers when out of bed  Taken 5/24/2023 0800 by Emily Her RN  Safety Promotion/Fall Prevention:   safety round/check completed   nonskid shoes/slippers when out of bed     Problem: Adjustment to Illness (Sepsis/Septic Shock)  Goal: Optimal Coping  Outcome: Ongoing, Progressing     Problem: Bleeding (Sepsis/Septic Shock)  Goal: Absence of Bleeding  Outcome: Ongoing, Progressing     Problem: Glycemic Control Impaired (Sepsis/Septic Shock)  Goal: Blood Glucose Level Within Desired Range  Outcome: Ongoing, Progressing     Problem: Infection Progression (Sepsis/Septic Shock)  Goal: Absence of Infection Signs and Symptoms  Outcome: Ongoing, Progressing     Problem: Nutrition Impaired (Sepsis/Septic Shock)  Goal: Optimal Nutrition Intake  Outcome: Ongoing, Progressing   Goal Outcome Evaluation:  Plan of Care Reviewed With: patient        Progress: no change  Outcome Evaluation: Pt had liver biposy today in IR. Pt having colostomy placed in OR now. Pt more oriented later in the shift. Will continue to monitor.

## 2023-05-24 NOTE — CASE MANAGEMENT/SOCIAL WORK
Continued Stay Note   Chaitanya     Patient Name: Cali Cavazos  MRN: [id number]  Today's Date: 5/24/2023    Admit Date: 5/21/2023    Plan: Anticipate routine home with spouse. PT eval pending.   Discharge Plan     Row Name 05/24/23 1258       Plan    Plan Anticipate routine home with spouse. PT eval pending.    Plan Comments DC Barriers: Scheduled to have colostomy surgery performed today, scheduled to have liver biopsy performed tomorrow, 5/25 in IR.              Maria Dolores Casillas RN     Office Phone: [phone]  Office Cell: [phone]

## 2023-05-24 NOTE — PROGRESS NOTES
"Pharmacy Antimicrobial Dosing Service    Subjective:  Cali Cavazos is a 72 y.o.male admitted with constipation and abdominal distention. Pharmacy has been consulted to dose Vancomycin and Cefepime for possible sepsis.      Assessment/Plan    1. Day #2 Vancomycin: Goal -600 mcg*h/mL. 1500mg (~22mg/kg ABW) IV x1 dose followed by 750mg (~11mg/kg ABW) IV q12h.  Tr level ordered for 5/25 at 1100 prior to fourth total dose.    2. Day #2 Cefepime: 2gm IV q8h for estCrCl > 60 mL/min.    Will continue to monitor drug levels, renal function, culture and sensitivities, and patient clinical status.       Objective:  Relevant clinical data and objective history reviewed:  182.9 cm (72\")   68.9 kg (151 lb 14.4 oz)   Ideal body weight: 77.6 kg (171 lb 1.2 oz)  Body mass index is 20.6 kg/m².        Results from last 7 days   Lab Units 05/23/23 2238 05/23/23 2047 05/23/23  0248   CREATININE mg/dL 0.90 0.87 0.90     Estimated Creatinine Clearance: 72.3 mL/min (by C-G formula based on SCr of 0.9 mg/dL).  I/O last 3 completed shifts:  In: 1245 [P.O.:720; I.V.:525]  Out: -     Results from last 7 days   Lab Units 05/23/23 2238 05/23/23 2047 05/23/23  0248   WBC 10*3/mm3 11.20* 8.70 7.80     Temperature    05/23/23 1951 05/23/23 2023 05/23/23 2215   Temp: 100.1 °F (37.8 °C) (!) 102.1 °F (38.9 °C) (!) 101.3 °F (38.5 °C)     Baseline culture/source/susceptibility:  Microbiology Results (last 10 days)       ** No results found for the last 240 hours. **            Sunil Ocasio  05/24/23 00:45 EDT    "

## 2023-05-24 NOTE — PROGRESS NOTES
LOS: 1 day   Patient Care Team:  William Crawford MD as PCP - General (Family Medicine)      Subjective     Interval History:     Subjective: Patient is confused.  He has not had any vomiting.  The patient's wife who is present at bedside reports that he did have a loose stool overnight.  No overt GI bleeding.  Sitter is present at bedside.      ROS:   Unobtainable secondary to confusion.       Medication Review:     Current Facility-Administered Medications:   •  acetaminophen (TYLENOL) tablet 650 mg, 650 mg, Oral, Q4H PRN, Shiraz Adamson MD, 650 mg at 05/23/23 2056  •  albumin human 25 % IV SOLN 37.5 g, 37.5 g, Intravenous, Once **OR** albumin human 25 % IV SOLN 50 g, 50 g, Intravenous, Once **OR** albumin human 25 % IV SOLN 62.5 g, 62.5 g, Intravenous, Once **OR** albumin human 25 % IV SOLN 75 g, 75 g, Intravenous, Once **OR** albumin human 25 % IV SOLN 87.5 g, 87.5 g, Intravenous, Once **OR** albumin human 25 % IV SOLN 100 g, 100 g, Intravenous, Once **OR** albumin human 25 % IV SOLN 112.5 g, 112.5 g, Intravenous, Once, Shiraz Adamson MD  •  [DISCONTINUED] sennosides-docusate (PERICOLACE) 8.6-50 MG per tablet 2 tablet, 2 tablet, Oral, BID, 2 tablet at 05/23/23 0807 **AND** [DISCONTINUED] polyethylene glycol (MIRALAX) packet 17 g, 17 g, Oral, Daily PRN **AND** [DISCONTINUED] bisacodyl (DULCOLAX) EC tablet 5 mg, 5 mg, Oral, Daily PRN **AND** bisacodyl (DULCOLAX) suppository 10 mg, 10 mg, Rectal, Daily PRN, Shiraz Adamson MD  •  cefepime 2 gm IVPB in 100 ml NS (MBP), 2 g, Intravenous, Q8H, Jaz Pelaez, APRN  •  fentaNYL citrate (PF) (SUBLIMAZE) injection, , Intravenous, PRN, Marshall Mcadams MD, 50 mcg at 05/24/23 1048  •  First Mouthwash (Magic Mouthwash) 5 mL, 5 mL, Swish & Spit, Q6H, Shiraz Adamson MD, 5 mL at 05/23/23 2217  •  gelatin absorbable (GELFOAM) sponge, , , PRN, Marshall Mcadams MD, 1 each at 05/24/23 1054  •  HYDROmorphone (DILAUDID) injection 0.5 mg, 0.5  mg, Intravenous, Q6H PRN, Shiraz Adamson MD  •  lidocaine (XYLOCAINE) 1 % injection, , Infiltration, PRN, Marshall Mcadams MD, 10 mL at 05/24/23 1048  •  lisinopril (PRINIVIL,ZESTRIL) tablet 40 mg, 40 mg, Oral, Daily, Shiraz Adamson MD, 40 mg at 05/23/23 1028  •  LORazepam (ATIVAN) injection 0.5 mg, 0.5 mg, Intravenous, Q6H PRN, Shiraz Adamson MD, 0.5 mg at 05/23/23 1842  •  Magnesium Standard Dose Replacement - Follow Nurse / BPA Driven Protocol, , Does not apply, PRN, Shiraz Adamson MD  •  melatonin tablet 5 mg, 5 mg, Oral, Nightly, Shiraz Adamson MD, 5 mg at 05/23/23 2217  •  metoprolol tartrate (LOPRESSOR) tablet 50 mg, 50 mg, Oral, BID With Meals, Shiraz Adamson MD, 50 mg at 05/23/23 1801  •  midazolam (VERSED) injection, , Intravenous, PRN, Marshall Mcadams MD, 0.5 mg at 05/24/23 1048  •  mirtazapine (REMERON) tablet 7.5 mg, 7.5 mg, Oral, Nightly, Shiraz Adamson MD, 7.5 mg at 05/23/23 2217  •  nitroglycerin (NITROSTAT) SL tablet 0.4 mg, 0.4 mg, Sublingual, Q5 Min PRN, Shiraz Adamson MD  •  ondansetron (ZOFRAN) tablet 4 mg, 4 mg, Oral, Q6H PRN **OR** ondansetron (ZOFRAN) injection 4 mg, 4 mg, Intravenous, Q6H PRN, Shiraz Adamson MD  •  ondansetron (ZOFRAN) injection, , Intravenous, PRN, Marshall Mcadams MD, 4 mg at 05/24/23 1045  •  oxyCODONE (ROXICODONE) immediate release tablet 5 mg, 5 mg, Oral, Q4H PRN, Shiraz Adamson MD, 5 mg at 05/23/23 0202  •  Pharmacy to Dose Cefepime, , Does not apply, Continuous PRN, Jaz Pelaez, APRN  •  Pharmacy to dose vancomycin, , Does not apply, Continuous PRN, Jaz Pelaez, APRN  •  polyethylene glycol (MIRALAX) packet 17 g, 17 g, Oral, Daily, Shiraz Adamson MD, 17 g at 05/23/23 1801  •  risperiDONE (risperDAL M-TABS) disintegrating tablet 1 mg, 1 mg, Oral, Nightly, Shiraz Adamson MD, 1 mg at 05/23/23 2217  •  [COMPLETED] Insert Peripheral IV, , , Once **AND**  sodium chloride 0.9 % flush 10 mL, 10 mL, Intravenous, PRN, Shiraz Adamson MD  •  sodium chloride 0.9 % flush 10 mL, 10 mL, Intravenous, Q12H, Shiraz Adamson MD, 10 mL at 05/24/23 0815  •  sodium chloride 0.9 % infusion 40 mL, 40 mL, Intravenous, PRN, Shiraz Adamson MD  •  sodium chloride 0.9 % infusion, 75 mL/hr, Intravenous, Continuous, Shiraz Adamson MD, Last Rate: 75 mL/hr at 05/24/23 1032, 75 mL/hr at 05/24/23 1032  •  sorbitol solution 50 mL, 50 mL, Oral, Once, Shiraz Adamson MD  •  vancomycin 750 mg in sodium chloride 0.9 % 250 mL IVPB-VTB, 750 mg, Intravenous, Q12H, Jaz Pelaez APRN      Objective     Vital Signs  Vitals:    05/24/23 1056 05/24/23 1100 05/24/23 1103 05/24/23 1106   BP: 93/46 106/56 95/59 102/58   BP Location:       Patient Position:       Pulse: 85 84 89 86   Resp: (!) 29 20 21 21   Temp:       TempSrc:       SpO2: 97% 98% 93% 93%   Weight:       Height:           Physical Exam:     General Appearance:    Awake and alert, in no acute distress, confused   Head:    Normocephalic, without obvious abnormality   Eyes:          Conjunctivae normal, anicteric sclera   Throat:   No oral lesions, no thrush, oral mucosa moist   Neck:   No adenopathy, supple, no JVD   Lungs:     respirations regular, even and unlabored   Abdomen:     Soft, non-tender, no rebound or guarding, non-distended   Rectal:     Deferred   Extremities:   No edema, no cyanosis   Skin:   No bruising or rash, no jaundice        Results Review:    CBC    Results from last 7 days   Lab Units 05/23/23 2238 05/23/23 2047 05/23/23 2046 05/23/23  2038 05/23/23  0248 05/22/23  0447 05/21/23  2352   WBC 10*3/mm3 11.20* 8.70  --   --  7.80 9.00 8.10   HEMOGLOBIN g/dL 11.0* 11.6*  --   --  11.7* 11.9* 12.1*   HEMOGLOBIN, POC g/dL  --   --  12.3 11.7*  --   --   --    PLATELETS 10*3/mm3 296 310  --   --  330 342 326     CMP   Results from last 7 days   Lab Units 05/23/23  4232  05/23/23 2047 05/23/23 0248 05/22/23 0447 05/21/23  2352   SODIUM mmol/L 135* 136 133* 131* 129*   POTASSIUM mmol/L 3.6 3.6 4.7 4.8 4.9   CHLORIDE mmol/L 99 98 95* 95* 95*   CO2 mmol/L 25.0 25.0 24.0 27.0 24.0   BUN mg/dL 15 16 13 19 19   CREATININE mg/dL 0.90 0.87 0.90 1.02 0.99   GLUCOSE mg/dL 105* 116* 103* 117* 101*   ALBUMIN g/dL 3.1* 3.2* 3.3*  --  3.7   BILIRUBIN mg/dL 0.8 0.8 0.7  --  0.4   ALK PHOS U/L 256* 245* 269*  --  299*   AST (SGOT) U/L 33 31 41*  --  47*   ALT (SGPT) U/L 33 34 41  --  43*   MAGNESIUM mg/dL 1.5*  --  1.9 1.9  --    PHOSPHORUS mg/dL 2.1*  --  2.9 3.1  --    AMMONIA umol/L  --  27  --   --  25     Cr Clearance Estimated Creatinine Clearance: 72.8 mL/min (by C-G formula based on SCr of 0.9 mg/dL).  Coag   Results from last 7 days   Lab Units 05/23/23 0248 05/21/23 2352   INR  1.09 1.04     HbA1C No results found for: HGBA1C  Blood Glucose   Glucose   Date/Time Value Ref Range Status   05/23/2023 2046 122 (H) 74 - 100 mg/dL Final   05/23/2023 2046 122 (H) 74 - 100 mg/dL Final     Comment:     Serial Number: [id number]:  [id number]   05/23/2023 2038 111 (H) 74 - 100 mg/dL Final   05/23/2023 2038 111 (H) 74 - 100 mg/dL Final     Comment:     Serial Number: [id number]:  [id number]     Infection     UA      Radiology(recent) CT Abdomen Pelvis Without Contrast    Result Date: 5/23/2023  1.Interval development of patchy infiltrates in lingula and bilateral lower lobes. Small bilateral pleural effusions. 2.Unchanged innumerable ill-defined metastatic lesions throughout hepatic parenchyma. 3.Unchanged extensive portacaval, retroperitoneal, periaortic, mesenteric, and pelvic metastatic lymphadenopathy. Unchanged extensive mesenteric carcinomatosis/omental caking. Moderate ascites throughout abdomen and pelvis, relatively unchanged. 4.Unchanged lucent lesion within L2 vertebral body. 5.Unchanged focal wall thickening involving rectosigmoid junction which may represent focal colitis, however,  unable to exclude rectal malignancy. Follow-up recommended. 6.Unchanged 1.6 cm hyperdense lesion in left kidney. Nonemergent MRI can be performed for further evaluation and to exclude renal malignancy. 7.Unchanged moderate ascites throughout abdomen and pelvis. 8.Urinary bladder wall thickening may represent cystitis or other etiologies. Please correlate with urinalysis. Follow-up recommended. Electronically Signed: Vladimir Flynn  5/23/2023 10:00 PM EDT  Workstation ID: [id number]    CT Head Without Contrast    Result Date: 5/23/2023  1.No acute intracranial hemorrhage. Calvarium is intact. Electronically Signed: Vladimir Flynn  5/23/2023 5:38 PM EDT  Workstation ID: [id number]         Assessment & Plan     ASSESSMENT:  -Constipation  -Abnormal CT showing peritoneal carcinomatosis, liver metastasis, left renal lesion, small quantity of ascites  -Peritoneal carcinomatosis  -Liver metastasis  -Ascites  -Unintentional weight loss  -Abdominal pain  -Elevated LFTs -suspect due to liver mets  -Mild normocytic anemia  -Anorexia/altered taste  -Hypertension  -History of prostate cancer     PLAN:  Patient is a 72-year-old male with history of hypertension and prostate cancer who was recently diagnosed with metastatic disease of unknown primary on outpatient CT.  Liver biopsy has been ordered as an outpatient, but not yet performed.  He began having worsening constipation and abdominal pain which prompted his presentation to the ER on 5/21.    Patient is confused.  Did have a loose bowel movement overnight without overt GI bleeding.  No vomiting.  S/p EGD/colonoscopy yesterday showing a high-grade rectal stricture secondary to either malignant intrinsic versus malignant extrinsic compression.  General surgery has been consulted for diverting colostomy.  Oncology is also following.  Liver biopsy is planned for tomorrow in IR.  Paracentesis is to be performed today.  Suspect malignant ascites secondary to peritoneal carcinomatosis.   We will send fluid for studies and replace albumin as needed.  Hemoglobin 11.0 from 11.6.  Continue to monitor H/H and transfuse as needed.  Alk phos remains elevated.  Suspect due to liver metastasis.  Continue supportive care.      Electronically signed by RALEIGH Saldivar, 05/24/23, 11:07 AM EDT.

## 2023-05-24 NOTE — H&P
[facility]   Interventional Radiology H&P    Patient Name: Cali Cavazos  : 1950  MRN: [id number]  Primary Care Physician:  William Crawford MD  Referring Physician: No ref. provider found  Date of admission: 2023    Subjective   Subjective     HPI:  Cali Cavazos is a 72 y.o. male with liver lesions    Review of Systems:   Constitutional no fever,  no weight loss       Otolaryngeal no difficulty swallowing   Cardiovascular no chest pain   Pulmonary no cough, no sputum production   Gastrointestinal no constipation, no diarrhea                         Personal History       Past Medical/Surgical History:   Past Medical History:   Diagnosis Date   • Hypertension    • Prostate cancer      Past Surgical History:   Procedure Laterality Date   • BACK SURGERY      two back surgeries    • COLONOSCOPY         Social History:  reports that he quit smoking about 12 months ago. His smoking use included cigarettes. He started smoking about 55 years ago. He has a 50.00 pack-year smoking history. He has never used smokeless tobacco. He reports that he does not currently use alcohol. He reports that he does not use drugs.    Medications:  Medications Prior to Admission   Medication Sig Dispense Refill Last Dose   • lisinopril (PRINIVIL,ZESTRIL) 40 MG tablet Take 1 tablet by mouth Daily.   2023   • metoprolol tartrate (LOPRESSOR) 50 MG tablet Take 1 tablet by mouth 2 (Two) Times a Day With Meals.   2023   • mirtazapine (REMERON) 7.5 MG tablet Take 1 tablet by mouth Every Night.   Past Week   • oxyCODONE (Roxicodone) 5 MG immediate release tablet Take 1 tablet by mouth Every 6 (Six) Hours As Needed for Moderate Pain. 120 tablet 0 2023     Current medications:  albumin human, 37.5 g, Intravenous, Once   Or  albumin human, 50 g, Intravenous, Once   Or  albumin human, 62.5 g, Intravenous, Once   Or  albumin human, 75 g, Intravenous, Once   Or  albumin human, 87.5 g, Intravenous, Once    Or  albumin human, 100 g, Intravenous, Once   Or  albumin human, 112.5 g, Intravenous, Once  cefepime, 2 g, Intravenous, Q8H  First Mouthwash (Magic Mouthwash), 5 mL, Swish & Spit, Q6H  lisinopril, 40 mg, Oral, Daily  melatonin, 5 mg, Oral, Nightly  metoprolol tartrate, 50 mg, Oral, BID With Meals  mirtazapine, 7.5 mg, Oral, Nightly  polyethylene glycol, 17 g, Oral, Daily  risperiDONE, 1 mg, Oral, Nightly  sodium chloride, 10 mL, Intravenous, Q12H  sorbitol, 50 mL, Oral, Once  vancomycin, 750 mg, Intravenous, Q12H      Current IV drips:  Pharmacy to Dose Cefepime,   Pharmacy to dose vancomycin,   sodium chloride, 75 mL/hr, Last Rate: 75 mL/hr (05/23/23 2245)        Allergies:  Allergies   Allergen Reactions   • Penicillins Hives       Objective    Objective     Vitals:   Temp:  [98 °F (36.7 °C)-102.1 °F (38.9 °C)] 98.5 °F (36.9 °C)  Heart Rate:  [] 92  Resp:  [19-36] 29  BP: ()/(44-90) 101/70  Flow (L/min):  [2-3] 3      Physical Exam:   Constitutional: Awake, alert, No acute distress    Respiratory: Clear to auscultation bilaterally, nonlabored respirations    Cardiovascular: RRR, no murmurs, rubs, or gallops, palpable pedal pulses bilaterally   Gastrointestinal: Positive bowel sounds, soft, nontender, nondistended        ASA SCALE ASSESSMENT:  2-Mild to moderate systemic disease, medically well controlled, with no functional limitation    MALLAMPATI CLASSIFICATION:  2-Able to visualize the soft palate, fauces, uvula. The anterior & posterior tonsilar pillars are hidden by the tongue.       Result Review        Result Review:     Sodium   Date Value Ref Range Status   05/23/2023 135 (L) 136 - 145 mmol/L Final   05/23/2023 136 136 - 145 mmol/L Final   05/23/2023 133 (L) 136 - 145 mmol/L Final   05/22/2023 131 (L) 136 - 145 mmol/L Final   05/21/2023 129 (L) 136 - 145 mmol/L Final       Potassium   Date Value Ref Range Status   05/23/2023 3.6 3.5 - 5.2 mmol/L Final   05/23/2023 3.6 3.5 - 5.2 mmol/L Final    05/23/2023 4.7 3.5 - 5.2 mmol/L Final     Comment:     Slight hemolysis detected by analyzer. Results may be affected.   05/22/2023 4.8 3.5 - 5.2 mmol/L Final   05/21/2023 4.9 3.5 - 5.2 mmol/L Final     Comment:     Slight hemolysis detected by analyzer. Results may be affected.       Chloride   Date Value Ref Range Status   05/23/2023 99 98 - 107 mmol/L Final   05/23/2023 98 98 - 107 mmol/L Final   05/23/2023 95 (L) 98 - 107 mmol/L Final   05/22/2023 95 (L) 98 - 107 mmol/L Final   05/21/2023 95 (L) 98 - 107 mmol/L Final       No results found for: PLASMABICARB    BUN   Date Value Ref Range Status   05/23/2023 15 8 - 23 mg/dL Final   05/23/2023 16 8 - 23 mg/dL Final   05/23/2023 13 8 - 23 mg/dL Final   05/22/2023 19 8 - 23 mg/dL Final   05/21/2023 19 8 - 23 mg/dL Final       Creatinine   Date Value Ref Range Status   05/23/2023 0.90 0.76 - 1.27 mg/dL Final   05/23/2023 0.87 0.76 - 1.27 mg/dL Final   05/23/2023 0.90 0.76 - 1.27 mg/dL Final   05/22/2023 1.02 0.76 - 1.27 mg/dL Final   05/21/2023 0.99 0.76 - 1.27 mg/dL Final       Calcium   Date Value Ref Range Status   05/23/2023 8.8 8.6 - 10.5 mg/dL Final   05/23/2023 9.1 8.6 - 10.5 mg/dL Final   05/23/2023 9.4 8.6 - 10.5 mg/dL Final   05/22/2023 9.9 8.6 - 10.5 mg/dL Final   05/21/2023 9.5 8.6 - 10.5 mg/dL Final           No components found for: GLUCOSE.*  Results from last 7 days   Lab Units 05/23/23  2238   WBC 10*3/mm3 11.20*   HEMOGLOBIN g/dL 11.0*   HEMATOCRIT % 33.9*   PLATELETS 10*3/mm3 296      Results from last 7 days   Lab Units 05/23/23  0248   INR  1.09           Assessment / Plan     Assesment:   liver lesions      Plan:   Liver biopsy    The risks and benefits of the procedure were discussed with the patient.    Electronically signed by Marshall Mcadams MD, 05/24/23, 9:00 AM EDT.

## 2023-05-24 NOTE — ANESTHESIA PREPROCEDURE EVALUATION
Anesthesia Evaluation     Patient summary reviewed and Nursing notes reviewed   NPO Solid Status: > 8 hours  NPO Liquid Status: > 8 hours           Airway   Mallampati: II  TM distance: >3 FB  Neck ROM: full  No difficulty expected  Dental - normal exam         Pulmonary - negative pulmonary ROS and normal exam    breath sounds clear to auscultation  Cardiovascular - normal exam  Exercise tolerance: unable to assess    ECG reviewed  Rhythm: regular  Rate: normal    (+) hypertension,     ROS comment: No Cardiac studies    Neuro/Psych  (+) weakness,    GI/Hepatic/Renal/Endo    (+)   liver disease,     Musculoskeletal (-) negative ROS    Abdominal  - normal exam   Substance History - negative use     OB/GYN negative ob/gyn ROS         Other      history of cancer active    ROS/Med Hx Other: Chief Complaint  Patient presents with  • Constipation      Pt reports constipation for the past 3 days.  Pt reports anorexia, states total loss of appetite and states that the taste of food has changed.  Pt reports metastatic cancer to the liver and is not currently doing radiation or chemo.            Subjective Expand by Default     72-year-old gentleman with a recent diagnosis of peritoneal carcinomatosis.  He says that he has had loss of taste has been his main symptom does not want to eat food has lost 20 or 30 pounds and has about 4 days of constipation leading to this hospital stay.  On his CT scan he was found to have liver metastasis intraperitoneal carcinomatosis but no evidence of obstruction.  He underwent endoscopic evaluation yesterday demonstrating a stricture at the 6 to 8 cm in the rectum but a  scope was able to get past and not able to traverse the entire colon they were only able to go to about 50 cm but the colon above this appeared relatively normal.  Because of the rectal stricture I was asked to see him for diverting colostomy.      About a year ago he was diagnosed and treated for prostate cancer.   He was treated with radiation.  He has undergone a liver biopsy under ultrasound guidance today for pathology he has also undergone biopsies endoscopically that are pending as well.  He is passing minimal flatus he has had some loose stool with his bowel prep and continues to have lower abdominal pain.                          Anesthesia Plan    ASA 4     general and ERAS Protocol   total IV anesthesia  (GETA, Loose lower tooth. Says he needs to get it removed   )  intravenous induction     Anesthetic plan, risks, benefits, and alternatives have been provided, discussed and informed consent has been obtained with: patient.        CODE STATUS:    Level Of Support Discussed With: Patient  Code Status (Patient has no pulse and is not breathing): CPR (Attempt to Resuscitate)  Medical Interventions (Patient has pulse or is breathing): Full Support

## 2023-05-24 NOTE — CONSULTS
Inpatient General Surgery Consult  Consult performed by: Floyd Ambriz MD  Consult ordered by: Shiraz Adamson MD  Reason for consult: colon obstruction          General Surgery Consult Note      Name: Cali Cavazos ADMIT: 2023   : 1950  PCP: William Crawford MD    MRN: [id number] LOS: 1 days   AGE/SEX: 72 y.o. male  ROOM:    [facility]      Patient Care Team:  William Crawford MD as PCP - General (Family Medicine)  Chief Complaint   Patient presents with   • Constipation     Pt reports constipation for the past 3 days.  Pt reports anorexia, states total loss of appetite and states that the taste of food has changed.  Pt reports metastatic cancer to the liver and is not currently doing radiation or chemo.        Subjective   72-year-old gentleman with a recent diagnosis of peritoneal carcinomatosis.  He says that he has had loss of taste has been his main symptom does not want to eat food has lost 20 or 30 pounds and has about 4 days of constipation leading to this hospital stay.  On his CT scan he was found to have liver metastasis intraperitoneal carcinomatosis but no evidence of obstruction.  He underwent endoscopic evaluation yesterday demonstrating a stricture at the 6 to 8 cm in the rectum but a  scope was able to get past and not able to traverse the entire colon they were only able to go to about 50 cm but the colon above this appeared relatively normal.  Because of the rectal stricture I was asked to see him for diverting colostomy.     About a year ago he was diagnosed and treated for prostate cancer.  He was treated with radiation.  He has undergone a liver biopsy under ultrasound guidance today for pathology he has also undergone biopsies endoscopically that are pending as well.  He is passing minimal flatus he has had some loose stool with his bowel prep and continues to have lower abdominal pain.     Past Medical History:   Diagnosis Date   • Hypertension    •  Prostate cancer      Past Surgical History:   Procedure Laterality Date   • BACK SURGERY      two back surgeries    • COLONOSCOPY     • COLONOSCOPY N/A 2023    Procedure: COLONOSCOPY with rectal biopsy's;  Surgeon: Shiraz Adamson MD;  Location: [facility] ENDOSCOPY;  Service: Gastroenterology;  Laterality: N/A;  high grade distal colonic stricture   • ENDOSCOPY N/A 2023    Procedure: ESOPHAGOGASTRODUODENOSCOPY;  Surgeon: Shiraz Adamson MD;  Location: [facility] ENDOSCOPY;  Service: Gastroenterology;  Laterality: N/A;  GERD     Family History   Problem Relation Age of Onset   • Stroke Mother    • Stomach cancer Father 74   • Throat cancer Sister    • Cancer Brother         Unclear origin. Bladder?   • Prostate cancer Brother 65     Social History     Tobacco Use   • Smoking status: Former     Packs/day: 1.00     Years: 50.00     Pack years: 50.00     Types: Cigarettes     Start date:      Quit date: 2022     Years since quittin.0   • Smokeless tobacco: Never   Vaping Use   • Vaping Use: Never used   Substance Use Topics   • Alcohol use: Not Currently   • Drug use: Never     Medications Prior to Admission   Medication Sig Dispense Refill Last Dose   • lisinopril (PRINIVIL,ZESTRIL) 40 MG tablet Take 1 tablet by mouth Daily.   2023   • metoprolol tartrate (LOPRESSOR) 50 MG tablet Take 1 tablet by mouth 2 (Two) Times a Day With Meals.   2023   • mirtazapine (REMERON) 7.5 MG tablet Take 1 tablet by mouth Every Night.   Past Week   • oxyCODONE (Roxicodone) 5 MG immediate release tablet Take 1 tablet by mouth Every 6 (Six) Hours As Needed for Moderate Pain. 120 tablet 0 2023     albumin human, 37.5 g, Intravenous, Once   Or  albumin human, 50 g, Intravenous, Once   Or  albumin human, 62.5 g, Intravenous, Once   Or  albumin human, 75 g, Intravenous, Once   Or  albumin human, 87.5 g, Intravenous, Once   Or  albumin human, 100 g, Intravenous, Once   Or  albumin human,  112.5 g, Intravenous, Once  cefepime, 2 g, Intravenous, Q8H  First Mouthwash (Magic Mouthwash), 5 mL, Swish & Spit, Q6H  lisinopril, 40 mg, Oral, Daily  melatonin, 5 mg, Oral, Nightly  metoprolol tartrate, 50 mg, Oral, BID With Meals  mirtazapine, 7.5 mg, Oral, Nightly  polyethylene glycol, 17 g, Oral, Daily  risperiDONE, 1 mg, Oral, Nightly  sodium chloride, 10 mL, Intravenous, Q12H  sorbitol, 50 mL, Oral, Once  vancomycin, 750 mg, Intravenous, Q12H      Pharmacy to Dose Cefepime,   Pharmacy to dose vancomycin,   sodium chloride, 75 mL/hr, Last Rate: 75 mL/hr (05/24/23 1032)      •  acetaminophen  •  [DISCONTINUED] senna-docusate sodium **AND** [DISCONTINUED] polyethylene glycol **AND** [DISCONTINUED] bisacodyl **AND** bisacodyl  •  HYDROmorphone  •  LORazepam  •  Magnesium Standard Dose Replacement - Follow Nurse / BPA Driven Protocol  •  nitroglycerin  •  ondansetron **OR** ondansetron  •  oxyCODONE  •  Pharmacy to Dose Cefepime  •  Pharmacy to dose vancomycin  •  [COMPLETED] Insert Peripheral IV **AND** sodium chloride  •  sodium chloride  Penicillins    Review of Systems   Constitutional: Negative for chills and fever.   HENT: Negative for sore throat and trouble swallowing.    Eyes: Negative for blurred vision and double vision.   Respiratory: Negative for cough and shortness of breath.    Cardiovascular: Negative for chest pain and leg swelling.   Gastrointestinal: Positive for abdominal distention, abdominal pain and constipation. Negative for blood in stool, diarrhea, nausea and vomiting.   Genitourinary: Negative for dysuria and hematuria.   Skin: Negative for rash and wound.   Psychiatric/Behavioral: Negative for agitation and depressed mood.        Objective     Vital Signs and Labs:  Vital Signs Patient Vitals for the past 24 hrs:   BP Temp Temp src Pulse Resp SpO2 Weight   05/24/23 1151 117/70 -- -- 109 -- 96 % --   05/24/23 1114 103/61 -- -- 86 22 93 % --   05/24/23 1110 105/57 -- -- 85 22 93 % --    05/24/23 1106 102/58 -- -- 86 21 93 % --   05/24/23 1103 95/59 -- -- 89 21 93 % --   05/24/23 1100 106/56 -- -- 84 20 98 % --   05/24/23 1056 93/46 -- -- 85 (!) 29 97 % --   05/24/23 1053 94/47 -- -- 88 22 98 % --   05/24/23 1048 98/48 -- -- 88 25 98 % --   05/24/23 1043 96/51 -- -- 90 24 95 % --   05/24/23 0525 101/70 98.5 °F (36.9 °C) Oral 92 (!) 29 91 % 69.4 kg (153 lb)   05/24/23 0245 102/59 -- -- 103 -- -- --   05/24/23 0230 95/44 -- -- 105 -- -- --   05/24/23 0215 95/61 -- -- 118 -- -- --   05/24/23 0200 90/54 -- -- 117 -- -- --   05/24/23 0145 95/61 -- -- (!) 126 -- -- --   05/24/23 0130 105/63 -- -- (!) 127 -- 93 % --   05/24/23 0115 105/57 98 °F (36.7 °C) Oral 116 20 91 % --   05/24/23 0105 95/50 -- -- 113 -- 92 % --   05/24/23 0047 102/69 -- -- (!) 128 -- 92 % --   05/24/23 0002 90/54 -- -- (!) 127 -- -- --   05/23/23 2346 106/66 -- -- 118 -- -- --   05/23/23 2316 100/61 -- -- 120 -- -- --   05/23/23 2302 123/74 -- -- (!) 124 -- -- --   05/23/23 2247 113/67 -- -- 103 -- 94 % --   05/23/23 2231 105/71 -- -- 103 -- 90 % --   05/23/23 2215 97/60 (!) 101.3 °F (38.5 °C) Rectal 101 (!) 34 92 % --   05/23/23 2201 109/67 -- -- 103 -- 94 % --   05/23/23 2101 116/78 -- -- (!) 125 -- 98 % --   05/23/23 2050 124/73 -- -- (!) 133 28 97 % --   05/23/23 2048 -- -- -- (!) 135 -- 96 % --   05/23/23 2045 126/70 -- -- (!) 136 -- 97 % --   05/23/23 2042 -- -- -- (!) 139 -- 96 % --   05/23/23 2040 126/70 -- -- (!) 136 (!) 36 96 % --   05/23/23 2039 -- -- -- (!) 144 -- 96 % --   05/23/23 2036 -- -- -- (!) 137 -- 96 % --   05/23/23 2034 (!) 85/64 -- -- -- -- -- --   05/23/23 2033 -- -- -- (!) 136 -- 95 % --   05/23/23 2030 -- -- -- (!) 133 -- 96 % --   05/23/23 2027 -- -- -- (!) 135 -- 92 % --   05/23/23 2026 146/67 -- -- -- -- -- --   05/23/23 2023 (!) 85/64 (!) 102.1 °F (38.9 °C) Axillary (!) 136 (!) 36 95 % --   05/23/23 1951 116/64 100.1 °F (37.8 °C) Oral (!) 130 (!) 30 91 % 68.9 kg (151 lb 14.4 oz)   05/23/23 1754 -- --  -- 84 -- -- --   05/23/23 1629 122/73 -- -- 71 19 98 % --   05/23/23 1619 112/64 -- -- 74 23 97 % --   05/23/23 1608 104/71 -- -- 77 24 100 % --   05/23/23 1449 124/75 -- -- 82 22 96 % --       Physical Exam:  Physical Exam  Constitutional:       General: He is not in acute distress.     Comments: Thin   HENT:      Head: Normocephalic and atraumatic.   Eyes:      General: No scleral icterus.     Conjunctiva/sclera: Conjunctivae normal.   Cardiovascular:      Pulses: Normal pulses.      Heart sounds: No murmur heard.  Pulmonary:      Effort: Pulmonary effort is normal. No respiratory distress.   Abdominal:      Comments: Distended moderate tenderness to palpation   Musculoskeletal:         General: No swelling or tenderness.   Skin:     General: Skin is warm and dry.   Neurological:      General: No focal deficit present.      Mental Status: Mental status is at baseline.   Psychiatric:         Mood and Affect: Mood normal.         Behavior: Behavior normal.         CBC    Results from last 7 days   Lab Units 05/23/23 2238 05/23/23 2047 05/23/23 2046 05/23/23 2038 05/23/23 0248 05/22/23 0447 05/21/23  2352   WBC 10*3/mm3 11.20* 8.70  --   --  7.80 9.00 8.10   HEMOGLOBIN g/dL 11.0* 11.6*  --   --  11.7* 11.9* 12.1*   HEMOGLOBIN, POC g/dL  --   --  12.3 11.7*  --   --   --    PLATELETS 10*3/mm3 296 310  --   --  330 342 326     CMP   Results from last 7 days   Lab Units 05/23/23 2238 05/23/23 2047 05/23/23 0248 05/22/23 0447 05/21/23  2352   SODIUM mmol/L 135* 136 133* 131* 129*   POTASSIUM mmol/L 3.6 3.6 4.7 4.8 4.9   CHLORIDE mmol/L 99 98 95* 95* 95*   CO2 mmol/L 25.0 25.0 24.0 27.0 24.0   BUN mg/dL 15 16 13 19 19   CREATININE mg/dL 0.90 0.87 0.90 1.02 0.99   GLUCOSE mg/dL 105* 116* 103* 117* 101*   ALBUMIN g/dL 3.1* 3.2* 3.3*  --  3.7   BILIRUBIN mg/dL 0.8 0.8 0.7  --  0.4   ALK PHOS U/L 256* 245* 269*  --  299*   AST (SGOT) U/L 33 31 41*  --  47*   ALT (SGPT) U/L 33 34 41  --  43*   AMMONIA umol/L  --  27   --   --  25     Radiology(recent) CT Abdomen Pelvis Without Contrast    Result Date: 5/23/2023  1.Interval development of patchy infiltrates in lingula and bilateral lower lobes. Small bilateral pleural effusions. 2.Unchanged innumerable ill-defined metastatic lesions throughout hepatic parenchyma. 3.Unchanged extensive portacaval, retroperitoneal, periaortic, mesenteric, and pelvic metastatic lymphadenopathy. Unchanged extensive mesenteric carcinomatosis/omental caking. Moderate ascites throughout abdomen and pelvis, relatively unchanged. 4.Unchanged lucent lesion within L2 vertebral body. 5.Unchanged focal wall thickening involving rectosigmoid junction which may represent focal colitis, however, unable to exclude rectal malignancy. Follow-up recommended. 6.Unchanged 1.6 cm hyperdense lesion in left kidney. Nonemergent MRI can be performed for further evaluation and to exclude renal malignancy. 7.Unchanged moderate ascites throughout abdomen and pelvis. 8.Urinary bladder wall thickening may represent cystitis or other etiologies. Please correlate with urinalysis. Follow-up recommended. Electronically Signed: Vladimir Flynn  5/23/2023 10:00 PM EDT  Workstation ID: [id number]    CT Head Without Contrast    Result Date: 5/23/2023  1.No acute intracranial hemorrhage. Calvarium is intact. Electronically Signed: Vladimir Flynn  5/23/2023 5:38 PM EDT  Workstation ID: [id number]    US Guided Liver Biopsy    Result Date: 5/24/2023  1. Successful ultrasound-guided biopsy of liver lesion Electronically Signed: Marshall Mcadams  5/24/2023 11:59 AM EDT  Workstation ID: [id number]      I reviewed the patient's new clinical results.  I reviewed the patient's new imaging results and agree with the interpretation.    Assessment & Plan       Peritoneal carcinomatosis    Abdominal pain    Hyponatremia    Abnormal CT of the abdomen    Severe Malnutrition (HCC)      72 y.o. male who I been asked to see for rectal obstruction in the setting of  peritoneal carcinomatosis pathology pending from both the endoscopic and the percutaneous biopsies.  Because of the rectal stricture regardless of the etiology he does have at least a partial likely high-grade colonic obstruction.  I counseled him and his family about the treatment options.  I talked specifically about the steps of open colostomy placement.  We talked about the anticipated recovery the possible complications.  Some of the alternatives discussed would be treating the primary tumor once pathology is back, or even considering hospice care.  After our discussion about the risks and benefits of the laparotomy with the colostomy they do understand that the risk is higher with the peritoneal carcinomatosis but do wish to proceed with colostomy if possible.    This is an acute life-threatening problem and counseling about a major abdominal surgery with risk factors for morbidity including the peritoneal carcinomatosis    This note was created using Dragon Voice Recognition software.    Floyd Ambriz MD  05/24/23  12:52 EDT

## 2023-05-24 NOTE — PROGRESS NOTES
LOS: 1 day   Patient Care Team:  William Crawford MD as PCP - General (Family Medicine)    Subjective     Interval History:     Patient Complaints: down in the OR         Objective     Vital Signs  Temp:  [98 °F (36.7 °C)-102.1 °F (38.9 °C)] 98.5 °F (36.9 °C)  Heart Rate:  [] 86  Resp:  [19-36] 22  BP: ()/(44-90) 103/61       Results Review:    Lab Results (last 24 hours)     Procedure Component Value Units Date/Time    MRSA Screen, PCR (Inpatient) - Swab, Nares [[id number]] Collected: 05/24/23 1129    Specimen: Swab from Nares Updated: 05/24/23 1137    Tissue Pathology Exam [[id number]] Collected: 05/24/23 1051    Specimen: Tissue from Liver Updated: 05/24/23 1105    Tissue Pathology Exam [[id number]] Collected: 05/23/23 1600    Specimen: Tissue from Large Intestine, Rectum Updated: 05/24/23 1055    Extra Tubes [[id number]] Collected: 05/23/23 2047    Specimen: Blood, Venous Line Updated: 05/24/23 0101    Narrative:      The following orders were created for panel order Extra Tubes.  Procedure                               Abnormality         Status                     ---------                               -----------         ------                     Rucker Top[[id number]]                                         Final result                 Please view results for these tests on the individual orders.    Gray Top [[id number]] Collected: 05/23/23 2047    Specimen: Blood Updated: 05/24/23 0101     Extra Tube Hold for add-ons.     Comment: Auto resulted.       Extra Tubes [[id number]] Collected: 05/23/23 2238    Specimen: Blood, Venous Line Updated: 05/23/23 2345    Narrative:      The following orders were created for panel order Extra Tubes.  Procedure                               Abnormality         Status                     ---------                               -----------         ------                     Gold Top - [facility][[id number]]                                   Final result                 Please  view results for these tests on the individual orders.    Gold Top - [facility] [[id number]] Collected: 05/23/23 2238    Specimen: Blood Updated: 05/23/23 2345     Extra Tube Hold for add-ons.     Comment: Auto resulted.       Phosphorus [[id number]]  (Abnormal) Collected: 05/23/23 2238    Specimen: Blood Updated: 05/23/23 2314     Phosphorus 2.1 mg/dL     Magnesium [[id number]]  (Abnormal) Collected: 05/23/23 2238    Specimen: Blood Updated: 05/23/23 2313     Magnesium 1.5 mg/dL     Comprehensive Metabolic Panel [[id number]]  (Abnormal) Collected: 05/23/23 2238    Specimen: Blood Updated: 05/23/23 2313     Glucose 105 mg/dL      BUN 15 mg/dL      Creatinine 0.90 mg/dL      Sodium 135 mmol/L      Potassium 3.6 mmol/L      Chloride 99 mmol/L      CO2 25.0 mmol/L      Calcium 8.8 mg/dL      Total Protein 5.7 g/dL      Albumin 3.1 g/dL      ALT (SGPT) 33 U/L      AST (SGOT) 33 U/L      Alkaline Phosphatase 256 U/L      Total Bilirubin 0.8 mg/dL      Globulin 2.6 gm/dL      A/G Ratio 1.2 g/dL      BUN/Creatinine Ratio 16.7     Anion Gap 11.0 mmol/L      eGFR 90.7 mL/min/1.73     Narrative:      GFR Normal >60  Chronic Kidney Disease <60  Kidney Failure <15    The GFR formula is only valid for adults with stable renal function between ages 18 and 70.    CBC & Differential [[id number]]  (Abnormal) Collected: 05/23/23 2238    Specimen: Blood Updated: 05/23/23 2252    Narrative:      The following orders were created for panel order CBC & Differential.  Procedure                               Abnormality         Status                     ---------                               -----------         ------                     CBC Auto Differential[[id number]]        Abnormal            Final result                 Please view results for these tests on the individual orders.    CBC Auto Differential [[id number]]  (Abnormal) Collected: 05/23/23 2238    Specimen: Blood Updated: 05/23/23 2252     WBC 11.20 10*3/mm3      RBC 3.85 10*6/mm3       Hemoglobin 11.0 g/dL      Hematocrit 33.9 %      MCV 88.1 fL      MCH 28.5 pg      MCHC 32.3 g/dL      RDW 13.2 %      RDW-SD 40.7 fl      MPV 7.7 fL      Platelets 296 10*3/mm3      Neutrophil % 90.6 %      Lymphocyte % 2.3 %      Monocyte % 6.3 %      Eosinophil % 0.1 %      Basophil % 0.7 %      Neutrophils, Absolute 10.20 10*3/mm3      Lymphocytes, Absolute 0.30 10*3/mm3      Monocytes, Absolute 0.70 10*3/mm3      Eosinophils, Absolute 0.00 10*3/mm3      Basophils, Absolute 0.10 10*3/mm3      nRBC 0.0 /100 WBC     Blood Culture - Blood, Hand, Left [[id number]] Collected: 05/23/23 2238    Specimen: Blood from Hand, Left Updated: 05/23/23 2248    Blood Gas, Arterial - [[id number]]  (Abnormal) Collected: 05/23/23 2038    Specimen: Arterial Blood Updated: 05/23/23 2146     Site Left Radial     Sulaiman's Test Positive     pH, Arterial 7.544 pH units      pCO2, Arterial 30.6 mm Hg      pO2, Arterial 35.7 mm Hg      HCO3, Arterial 26.4 mmol/L      Base Excess, Arterial 4.3 mmol/L      Comment: Serial Number: [id number]  [id number]        O2 Saturation, Arterial 77.1 %      Barometric Pressure for Blood Gas --     Comment: N/A        Modality Cannula     FIO2 36 %      Hemodilution No    Comprehensive Metabolic Panel [[id number]]  (Abnormal) Collected: 05/23/23 2047    Specimen: Blood Updated: 05/23/23 2126     Glucose 116 mg/dL      BUN 16 mg/dL      Creatinine 0.87 mg/dL      Sodium 136 mmol/L      Potassium 3.6 mmol/L      Chloride 98 mmol/L      CO2 25.0 mmol/L      Calcium 9.1 mg/dL      Total Protein 5.8 g/dL      Albumin 3.2 g/dL      ALT (SGPT) 34 U/L      AST (SGOT) 31 U/L      Alkaline Phosphatase 245 U/L      Total Bilirubin 0.8 mg/dL      Globulin 2.6 gm/dL      A/G Ratio 1.2 g/dL      BUN/Creatinine Ratio 18.4     Anion Gap 13.0 mmol/L      eGFR 91.7 mL/min/1.73     Narrative:      GFR Normal >60  Chronic Kidney Disease <60  Kidney Failure <15    The GFR formula is only valid for adults with stable renal  function between ages 18 and 70.    Ammonia [[id number]]  (Normal) Collected: 05/23/23 2047    Specimen: Blood Updated: 05/23/23 2116     Ammonia 27 umol/L     CBC & Differential [[id number]]  (Abnormal) Collected: 05/23/23 2047    Specimen: Blood Updated: 05/23/23 2103    Narrative:      The following orders were created for panel order CBC & Differential.  Procedure                               Abnormality         Status                     ---------                               -----------         ------                     CBC Auto Differential[[id number]]        Abnormal            Final result                 Please view results for these tests on the individual orders.    CBC Auto Differential [[id number]]  (Abnormal) Collected: 05/23/23 2047    Specimen: Blood Updated: 05/23/23 2103     WBC 8.70 10*3/mm3      RBC 4.11 10*6/mm3      Hemoglobin 11.6 g/dL      Hematocrit 35.6 %      MCV 86.8 fL      MCH 28.2 pg      MCHC 32.5 g/dL      RDW 12.9 %      RDW-SD 38.9 fl      MPV 7.2 fL      Platelets 310 10*3/mm3      Neutrophil % 91.0 %      Lymphocyte % 2.5 %      Monocyte % 5.9 %      Eosinophil % 0.0 %      Basophil % 0.6 %      Neutrophils, Absolute 7.90 10*3/mm3      Lymphocytes, Absolute 0.20 10*3/mm3      Monocytes, Absolute 0.50 10*3/mm3      Eosinophils, Absolute 0.00 10*3/mm3      Basophils, Absolute 0.10 10*3/mm3      nRBC 0.0 /100 WBC     POC Lactate [[id number]]  (Normal) Collected: 05/23/23 2046    Specimen: Blood Updated: 05/23/23 2050     Lactate 1.1 mmol/L      Comment: Serial Number: [id number]:  [id number]       POC Glucose Once [[id number]]  (Abnormal) Collected: 05/23/23 2046    Specimen: Blood Updated: 05/23/23 2050     Glucose 122 mg/dL      Comment: Serial Number: [id number]:  [id number]       POCT Electrolytes +HGB +HCT [[id number]]  (Abnormal) Collected: 05/23/23 2046    Specimen: Blood Updated: 05/23/23 2050     Sodium 135 mmol/L      POC Potassium 3.5 mmol/L      Ionized Calcium 1.24 mmol/L       Comment: Serial Number: [id number]:  [id number]        Glucose 122 mg/dL      Hematocrit 36 %      Hemoglobin 12.3 g/dL     Blood Gas, Arterial - [[id number]]  (Abnormal) Collected: 05/23/23 2046    Specimen: Arterial Blood Updated: 05/23/23 2050     Site Left Radial     Sulaiman's Test Positive     pH, Arterial 7.486 pH units      pCO2, Arterial 35.8 mm Hg      pO2, Arterial 70.2 mm Hg      HCO3, Arterial 27.0 mmol/L      Base Excess, Arterial 3.7 mmol/L      Comment: Serial Number: [id number]:  [id number]        O2 Saturation, Arterial 95.2 %      Barometric Pressure for Blood Gas --     Comment: N/A        Modality Cannula     FIO2 36 %      Hemodilution No    POC Lactate [[id number]]  (Normal) Collected: 05/23/23 2038    Specimen: Blood Updated: 05/23/23 2044     Lactate 1.6 mmol/L      Comment: Serial Number: [id number]:  [id number]       POC Glucose Once [[id number]]  (Abnormal) Collected: 05/23/23 2038    Specimen: Blood Updated: 05/23/23 2044     Glucose 111 mg/dL      Comment: Serial Number: [id number]:  [id number]       POCT Electrolytes +HGB +HCT [[id number]]  (Abnormal) Collected: 05/23/23 2038    Specimen: Blood Updated: 05/23/23 2044     Sodium 134 mmol/L      POC Potassium 3.8 mmol/L      Ionized Calcium 1.18 mmol/L      Comment: Serial Number: [id number]:  [id number]        Glucose 111 mg/dL      Hematocrit 34 %      Hemoglobin 11.7 g/dL            Imaging Results (Last 24 Hours)     Procedure Component Value Units Date/Time    US Guided Liver Biopsy [[id number]] Resulted: 05/24/23 1034     Updated: 05/24/23 1055    CT Abdomen Pelvis Without Contrast [[id number]] Collected: 05/23/23 2143     Updated: 05/23/23 2202    Narrative:      CT ABDOMEN PELVIS WO CONTRAST    Date of Exam: 5/23/2023 9:35 PM EDT    Indication: Abdominal pain, post-op.    Comparison: 5/18/2023. 5/12/2023.    Technique: Axial CT images were obtained of the abdomen and pelvis without the administration of contrast. Sagittal and coronal  reconstructions were performed.  Automated exposure control and iterative reconstruction methods were used.      Findings:  *Lower Thorax: Interval development of patchy infiltrates in lingula and bilateral lower lobes. Small bilateral pleural effusions.  *Liver: Unchanged innumerable ill-defined metastatic lesions throughout hepatic parenchyma.  *Gallbladder: Gallbladder sludge.  *Pancreas: Normal.  *Spleen: Normal.  *Adrenal Glands: Normal.  *Kidneys: Unchanged mild hydroureteronephrosis without obstructing urinary stones. No renal stones hydronephrosis on left. Unchanged 1.6 cm hyperdense lesion in left kidney.  *Stomach:Unremarkable.  *Bowel: Unchanged focal wall thickening involving rectosigmoid junction. No bowel obstruction.  *Peritoneal Cavity: No pneumoperitoneum. Unchanged extensive portacaval, retroperitoneal, periaortic, mesenteric, and pelvic metastatic lymphadenopathy. Unchanged extensive mesenteric carcinomatosis/omental caking. Moderate ascites throughout abdomen and   pelvis, relatively unchanged.  *Urinary Bladder: Urinary bladder wall thickening.  *Bones: No acute fractures or dislocations. Unchanged lucent lesion within L2 vertebral body. Multilevel spondylosis. Generalized osteopenia.  *      Impression:      1.Interval development of patchy infiltrates in lingula and bilateral lower lobes. Small bilateral pleural effusions.  2.Unchanged innumerable ill-defined metastatic lesions throughout hepatic parenchyma.  3.Unchanged extensive portacaval, retroperitoneal, periaortic, mesenteric, and pelvic metastatic lymphadenopathy. Unchanged extensive mesenteric carcinomatosis/omental caking. Moderate ascites throughout abdomen and pelvis, relatively unchanged.  4.Unchanged lucent lesion within L2 vertebral body.  5.Unchanged focal wall thickening involving rectosigmoid junction which may represent focal colitis, however, unable to exclude rectal malignancy. Follow-up recommended.  6.Unchanged 1.6 cm  hyperdense lesion in left kidney. Nonemergent MRI can be performed for further evaluation and to exclude renal malignancy.  7.Unchanged moderate ascites throughout abdomen and pelvis.  8.Urinary bladder wall thickening may represent cystitis or other etiologies. Please correlate with urinalysis. Follow-up recommended.                Electronically Signed: Vladimir Flynn    5/23/2023 10:00 PM EDT    Workstation ID: [id number]    CT Head Without Contrast [[id number]] Collected: 05/23/23 1736     Updated: 05/23/23 1740    Narrative:      CT HEAD WO CONTRAST    Date of Exam: 5/23/2023 4:44 PM EDT    Indication: Mental status change, unknown cause.    Comparison: None available.    Technique: Axial CT images were obtained of the head without contrast administration.  Sagittal and coronal reconstructions were performed.  Automated exposure control and iterative reconstruction methods were used.      Findings:  *No acute intracranial hemorrhage.  *No masses, mass effect, midline shift or hydrocephalus.  *White matter is intact.   *Calvarium is intact.  *Visualized orbits and globes are unremarkable without radiopaque foreign bodies.  *Visualized paranasal sinuses are clear.  *Visualized mastoid air cells are clear.        Impression:      1.No acute intracranial hemorrhage. Calvarium is intact.          Electronically Signed: Vladimir Flynn    5/23/2023 5:38 PM EDT    Workstation ID: [id number]               I reviewed the patient's new clinical results.    Medication Review:   Scheduled Meds:albumin human, 37.5 g, Intravenous, Once   Or  albumin human, 50 g, Intravenous, Once   Or  albumin human, 62.5 g, Intravenous, Once   Or  albumin human, 75 g, Intravenous, Once   Or  albumin human, 87.5 g, Intravenous, Once   Or  albumin human, 100 g, Intravenous, Once   Or  albumin human, 112.5 g, Intravenous, Once  cefepime, 2 g, Intravenous, Q8H  First Mouthwash (Magic Mouthwash), 5 mL, Swish & Spit, Q6H  lisinopril, 40 mg, Oral,  Daily  melatonin, 5 mg, Oral, Nightly  metoprolol tartrate, 50 mg, Oral, BID With Meals  mirtazapine, 7.5 mg, Oral, Nightly  polyethylene glycol, 17 g, Oral, Daily  risperiDONE, 1 mg, Oral, Nightly  sodium chloride, 10 mL, Intravenous, Q12H  sorbitol, 50 mL, Oral, Once  vancomycin, 750 mg, Intravenous, Q12H      Continuous Infusions:Pharmacy to Dose Cefepime,   Pharmacy to dose vancomycin,   sodium chloride, 75 mL/hr, Last Rate: 75 mL/hr (05/24/23 1032)      PRN Meds:.•  acetaminophen  •  [DISCONTINUED] senna-docusate sodium **AND** [DISCONTINUED] polyethylene glycol **AND** [DISCONTINUED] bisacodyl **AND** bisacodyl  •  HYDROmorphone  •  LORazepam  •  Magnesium Standard Dose Replacement - Follow Nurse / BPA Driven Protocol  •  nitroglycerin  •  ondansetron **OR** ondansetron  •  oxyCODONE  •  Pharmacy to Dose Cefepime  •  Pharmacy to dose vancomycin  •  [COMPLETED] Insert Peripheral IV **AND** sodium chloride  •  sodium chloride     Interval History:     5/22 patient was to undergo EGD/colonoscopy today however due to chronic constipation and colonic burden patient is just now this morning beginning to clear bowels.  He has become confused per wife at bedside earlier this morning.  CEA is normal at 25 elevated LFTs most likely due to liver mets oncology and GI following.  Also plans for biopsy of liver and paracentesis.  Continue supportive care and monitor.  We will discontinue IV fluids this morning and restart after scopes.  We will decrease Dilaudid, cont Ativan continue oxy and give risperidone at night see if this improves mentation. CT head in am and another ammonia level    5/23 EGD/colonscopy High-grade rectal stricture secondary to either malignant intrinsic versus malignant extrinsic compression.  Biopsies pending.  This is a possible source of primary tumor.    5/24 paracentesis scheduled as well as laparotomy with colostomy    Assessment & Plan       Peritoneal carcinomatosis    Abdominal pain     Hyponatremia    Abnormal CT of the abdomen    Severe Malnutrition (HCC)    Peritoneal carcinoma ptosis  Liver metastasis  Ascites  Unintentional weight loss  Abdominal pain  Constipation  Abnormal CT showing peritoneal Gautam with ptosis, liver metastasis, liver left renal lesion, small quantity of ascites  Elevated LFTs  Mild normocytic anemia  Anorexia  Hypertension  History of prostate cancer      Plan for disposition:RONALDO Nunes MD  05/24/23  11:49 EDT

## 2023-05-24 NOTE — ANESTHESIA PROCEDURE NOTES
Airway  Urgency: elective    Date/Time: 5/24/2023 4:20 PM  Airway not difficult    General Information and Staff    Patient location during procedure: OR  Anesthesiologist: Esau Mcadams MD  CRNA/CAA: Jodi Lopez CRNA    Indications and Patient Condition  Indications for airway management: airway protection    Preoxygenated: yes  MILS maintained throughout  Mask difficulty assessment: 0 - not attempted    Final Airway Details  Final airway type: endotracheal airway      Successful airway: ETT  Cuffed: yes   Successful intubation technique: video laryngoscopy  Facilitating devices/methods: intubating stylet  Endotracheal tube insertion site: oral  Blade: Beckwith  Blade size: 4  ETT size (mm): 7.5  Cormack-Lehane Classification: grade I - full view of glottis  Placement verified by: chest auscultation and capnometry   Cuff volume (mL): 10  Measured from: lips  ETT/EBT  to lips (cm): 23  Number of attempts at approach: 1  Assessment: lips, teeth, and gum same as pre-op and atraumatic intubation

## 2023-05-24 NOTE — SIGNIFICANT NOTE
05/24/23 0943   OTHER   Discipline physical therapist   Rehab Time/Intention   Session Not Performed other (see comments)  (Emily SANTIAGO requests PT hold a.m. due to pt agitation.  Will re-attempt p.m. as appropriate and if schedule permits.)   Recommendation   PT - Next Appointment 05/25/23

## 2023-05-25 LAB
LAB AP CASE REPORT: NORMAL
LAB AP DIAGNOSIS COMMENT: NORMAL
MAGNESIUM SERPL-MCNC: 2.3 MG/DL (ref 1.6–2.4)
PATH REPORT.FINAL DX SPEC: NORMAL
PATH REPORT.GROSS SPEC: NORMAL
PHOSPHATE SERPL-MCNC: 2.9 MG/DL (ref 2.5–4.5)
QT INTERVAL: 325 MS

## 2023-05-25 PROCEDURE — 97162 PT EVAL MOD COMPLEX 30 MIN: CPT

## 2023-05-25 PROCEDURE — 99231 SBSQ HOSP IP/OBS SF/LOW 25: CPT | Performed by: INTERNAL MEDICINE

## 2023-05-25 PROCEDURE — 25010000002 HYDROMORPHONE 1 MG/ML SOLUTION: Performed by: SURGERY

## 2023-05-25 PROCEDURE — 97166 OT EVAL MOD COMPLEX 45 MIN: CPT

## 2023-05-25 PROCEDURE — 97116 GAIT TRAINING THERAPY: CPT

## 2023-05-25 PROCEDURE — 0 CEFEPIME PER 500 MG: Performed by: SURGERY

## 2023-05-25 PROCEDURE — 25010000002 MORPHINE PER 10 MG: Performed by: SURGERY

## 2023-05-25 RX ADMIN — HYDROMORPHONE HYDROCHLORIDE 0.5 MG: 1 INJECTION, SOLUTION INTRAMUSCULAR; INTRAVENOUS; SUBCUTANEOUS at 19:34

## 2023-05-25 RX ADMIN — MORPHINE SULFATE 2 MG: 2 INJECTION, SOLUTION INTRAMUSCULAR; INTRAVENOUS at 15:10

## 2023-05-25 RX ADMIN — MORPHINE SULFATE 2 MG: 2 INJECTION, SOLUTION INTRAMUSCULAR; INTRAVENOUS at 07:57

## 2023-05-25 RX ADMIN — METOPROLOL TARTRATE 50 MG: 50 TABLET ORAL at 07:57

## 2023-05-25 RX ADMIN — METOPROLOL TARTRATE 50 MG: 50 TABLET ORAL at 17:55

## 2023-05-25 RX ADMIN — OXYCODONE 5 MG: 5 TABLET ORAL at 02:39

## 2023-05-25 RX ADMIN — Medication 10 ML: at 21:19

## 2023-05-25 RX ADMIN — DIPHENHYDRAMINE HYDROCHLORIDE AND LIDOCAINE HYDROCHLORIDE AND ALUMINUM HYDROXIDE AND MAGNESIUM HYDRO 5 ML: KIT at 15:10

## 2023-05-25 RX ADMIN — CEFEPIME 2 G: 2 INJECTION, POWDER, FOR SOLUTION INTRAVENOUS at 07:56

## 2023-05-25 RX ADMIN — Medication 5 MG: at 21:17

## 2023-05-25 RX ADMIN — DIPHENHYDRAMINE HYDROCHLORIDE AND LIDOCAINE HYDROCHLORIDE AND ALUMINUM HYDROXIDE AND MAGNESIUM HYDRO 5 ML: KIT at 07:57

## 2023-05-25 RX ADMIN — RISPERIDONE 1 MG: 0.5 TABLET, ORALLY DISINTEGRATING ORAL at 21:17

## 2023-05-25 RX ADMIN — MORPHINE SULFATE 2 MG: 2 INJECTION, SOLUTION INTRAMUSCULAR; INTRAVENOUS at 05:16

## 2023-05-25 RX ADMIN — SODIUM CHLORIDE 75 ML/HR: 9 INJECTION, SOLUTION INTRAVENOUS at 02:44

## 2023-05-25 RX ADMIN — Medication 10 ML: at 08:07

## 2023-05-25 RX ADMIN — LISINOPRIL 40 MG: 20 TABLET ORAL at 08:04

## 2023-05-25 RX ADMIN — MIRTAZAPINE 7.5 MG: 7.5 TABLET ORAL at 21:17

## 2023-05-25 RX ADMIN — OXYCODONE 5 MG: 5 TABLET ORAL at 12:33

## 2023-05-25 NOTE — PLAN OF CARE
Goal Outcome Evaluation:  Plan of Care Reviewed With: patient, spouse              72-year-old male presented to the ED with constipation.  He has had poor appetite for over a year (with 30 pound weight loss). He has recently been diagnosed with metastatic cancer of unknown primary.  He reported no cough or shortness of breath.  Family reported he has been declining rapidly.  He has had history of prostate cancer treated with radiation last year from February to May. He was found on CT scan to have evidence of peritoneal carcinomatosis. He underwent endoscopic evaluation of the rectum was found to have obstruction. Exploratory laparotomy with formation of ostomy completed 5/24/23. Patient oriented x2, easily reoriented to time and place. He reports normally being very independent, completing ADLs and functional mobility without assistance. He is typically active and performs yard work and is an active . He reports pain 8/10, not increased with activity. He is Mod I to come to sitting EOB, and CGA to stand and ambulate with hand held support. Setup provided for lower body dressing. OT expects pt to progress well postoperatively, recommending HHOT at [place].

## 2023-05-25 NOTE — PLAN OF CARE
Goal Outcome Evaluation:  Plan of Care Reviewed With: patient, spouse, daughter           Outcome Evaluation: Pt is a 72 y.o. male admitted with c/o constipation, abdominal pain and distention and rapid decline at home. He was found to have Peritoneal Carcinomatosis. Pt underwent Ileostomy 5/24 by Dr. Ambriz. PMH: Metstatic CA, HTN, prostate CA. At baseline, pt lives with wife in a [facility] with 3 [employer].  He is normally independent with ambulation, ADLs, completes yardwork and is an active .  At time of evaluation he is A&O x 2 with marked abdominal pain, however he is motivated to get up and move.  He performs bed mobility with CGA and extra time due to pain. He ambulates 40' with HHA.  He would benefit from PT while here.  Current recommendation is for  PT.

## 2023-05-25 NOTE — PLAN OF CARE
Goal Outcome Evaluation:      Pt has been resting through the night since arrival back to the floor from surgery. Pt has had morphine roughly every 4 hours. Pt midline dressing and ostomy dressing leaking; however family did not want the dressing change and wanted it enforced instead due to tenderness after surgery. Plan for patient to have paracentesis at 0730 this morning. Daughter has remained at bedside and attentive to patient and his needs. VSS at this time with no new issues noted.

## 2023-05-25 NOTE — PROGRESS NOTES
General Surgery Progress Note    Name: Cali Cavazos ADMIT: 2023   : 1950  PCP: William Crawford MD    MRN: [id number] LOS: 2 days   AGE/SEX: 72 y.o. male  ROOM:    [facility]    Chief Complaint   Patient presents with   • Constipation     Pt reports constipation for the past 3 days.  Pt reports anorexia, states total loss of appetite and states that the taste of food has changed.  Pt reports metastatic cancer to the liver and is not currently doing radiation or chemo.      Subjective     72 y.o. male admitted with constipation loss of appetite found to have carcinomatosis postop day 1 exploratory laparotomy loop ileostomy    General not feeling too bad pain seems to be controlled no nausea or vomiting.  Only bowel sweat from the ileostomy it is dark red color not unexpectedly.    Objective     Scheduled Medications:   First Mouthwash (Magic Mouthwash), 5 mL, Swish & Spit, Q6H  lisinopril, 40 mg, Oral, Daily  melatonin, 5 mg, Oral, Nightly  metoprolol tartrate, 50 mg, Oral, BID With Meals  mirtazapine, 7.5 mg, Oral, Nightly  polyethylene glycol, 17 g, Oral, Daily  risperiDONE, 1 mg, Oral, Nightly  sodium chloride, 10 mL, Intravenous, Q12H  sorbitol, 50 mL, Oral, Once        Active Infusions:  niCARdipine, 5-15 mg/hr  phenylephrine, 0.5-3 mcg/kg/min  sodium chloride, 75 mL/hr, Last Rate: 75 mL/hr (23 0244)        As Needed Medications:  •  acetaminophen  •  [DISCONTINUED] senna-docusate sodium **AND** [DISCONTINUED] polyethylene glycol **AND** [DISCONTINUED] bisacodyl **AND** bisacodyl  •  HYDROmorphone  •  LORazepam  •  Magnesium Standard Dose Replacement - Follow Nurse / BPA Driven Protocol  •  Morphine  •  niCARdipine  •  nitroglycerin  •  ondansetron **OR** ondansetron  •  oxyCODONE  •  phenylephrine  •  [COMPLETED] Insert Peripheral IV **AND** sodium chloride  •  sodium chloride    Vital Signs  Vital Signs Patient Vitals for the past 24 hrs:   BP Temp Temp src Pulse Resp SpO2 Weight    05/25/23 0957 -- 98.1 °F (36.7 °C) Oral -- 18 98 % --   05/25/23 0510 143/88 97.9 °F (36.6 °C) Oral 87 18 95 % 70.5 kg (155 lb 6.8 oz)   05/25/23 0035 155/95 98.6 °F (37 °C) Oral 108 24 97 % --   05/24/23 2113 148/86 98.6 °F (37 °C) Oral 118 21 96 % --   05/24/23 1900 130/73 98.3 °F (36.8 °C) Axillary 84 22 92 % --   05/24/23 1837 139/66 97 °F (36.1 °C) Temporal 83 20 97 % --   05/24/23 1827 145/82 -- -- 77 23 98 % --   05/24/23 1812 130/69 -- -- 75 25 97 % --   05/24/23 1800 127/67 -- -- 73 19 99 % --   05/24/23 1752 121/66 -- -- 72 19 99 % --   05/24/23 1747 114/66 -- -- 79 20 98 % --   05/24/23 1742 112/62 98.2 °F (36.8 °C) Temporal 75 21 96 % --   05/24/23 1608 134/66 -- -- 87 22 95 % --   05/24/23 1151 117/70 -- -- 109 -- 96 % --       Physical Exam:  Physical Exam  Constitutional:       General: He is not in acute distress.     Appearance: He is not ill-appearing.   HENT:      Head: Normocephalic and atraumatic.   Pulmonary:      Effort: Pulmonary effort is normal. No respiratory distress.   Abdominal:      Comments: Appropriate tender to palpation no significant drainage from the midline incision the ileostomy right lower quadrant has approximately 30 cc of serosanguineous fluid in the stoma itself is dark in color appears moist   Neurological:      Mental Status: He is alert.         Results Review:     CBC    Results from last 7 days   Lab Units 05/24/23  2258 05/23/23 2238 05/23/23 2047 05/23/23  2046 05/23/23  2038 05/23/23  0248 05/22/23 0447 05/21/23  2352   WBC 10*3/mm3 14.00* 11.20* 8.70  --   --  7.80 9.00 8.10   HEMOGLOBIN g/dL 11.0* 11.0* 11.6*  --   --  11.7* 11.9* 12.1*   HEMOGLOBIN, POC g/dL  --   --   --  12.3 11.7*  --   --   --    PLATELETS 10*3/mm3 281 296 310  --   --  330 342 326     BMP   Results from last 7 days   Lab Units 05/24/23 2258 05/23/23 2238 05/23/23 2047 05/23/23  0248 05/22/23 0447 05/21/23  2352   SODIUM mmol/L 135* 135* 136 133* 131* 129*   POTASSIUM mmol/L 4.0 3.6  3.6 4.7 4.8 4.9   CHLORIDE mmol/L 101 99 98 95* 95* 95*   CO2 mmol/L 22.0 25.0 25.0 24.0 27.0 24.0   BUN mg/dL 18 15 16 13 19 19   CREATININE mg/dL 0.80 0.90 0.87 0.90 1.02 0.99   GLUCOSE mg/dL 184* 105* 116* 103* 117* 101*   MAGNESIUM mg/dL 2.3 1.5*  --  1.9 1.9  --    PHOSPHORUS mg/dL 2.9 2.1*  --  2.9 3.1  --        I reviewed the patient's new clinical results.    Assessment & Plan       Peritoneal carcinomatosis    Abdominal pain    Hyponatremia    Abnormal CT of the abdomen    Severe Malnutrition (HCC)    Colon obstruction      72 y.o. male postop day 1 loop ileostomy for carcinomatosis    Okay with full liquid diet  Pain control  Unfortunately had a difficult conversation with him about the severity of the intra-abdominal disease.  I think at this point unless there is something specific we are looking for paracentesis will not be necessary.  I did take some fluid and sent it down for cytology there is a possibility that if any additional studies are needed to be done that that could be performed.  If he has trouble with his ileostomy technically  this will be very challenging to revise as the mesentery was significantly foreshortened due to the cancer burden    Okay for DVT chemoprophylaxis        This note was created using Dragon Voice Recognition software.    Floyd Ambriz MD  05/25/23  11:24 EDT

## 2023-05-25 NOTE — PLAN OF CARE
Goal Outcome Evaluation:              Outcome Evaluation: Patient alert and oriented x2 forgetfull, pleasent. vital sistng stable with pain cotrolled with pain medications see mar. patients family at bedside while pt and ot worked with patient today. ilieosomy with small amount brown drainage  and small amount sanguinous after PT. patient turning self. paracentisis cancelled today.

## 2023-05-25 NOTE — NURSING NOTE
WOCN note:    72 yr old male admitted 5/21/23 with constipation and abdominal distention and found to have peritoneal carcinomatosis. Patient underwent an exploratory laparotomy with lysis of adhesions, omental biopsy and creation of a loop ileostomy on 5/24/23.     WOCN consult received for ostomy management and teaching. Patient awake, alert and oriented with wife at the bedside. He presents with a RLQ ileostomy and a midline abdominal incision. The stoma is red, moist and budded with a T bar in place. There is a small amount of thin sanguinous effluent in the pouch. It was charted that there was leakage of the ostomy pouch and/or the incision. It appears that the pouch was not fully sealed on the wafer. The reinforced dressing was removed and there is no further leakage noted.     Basic ostomy teaching was initiated with patient and wife. They were well engaged and asked appropriate questions. Patient was instructed on emptying the pouch and cleaning the closure system. He was instructed on bathing, dressing and frequency of pouch changes. The written materials were briefly reviewed and left at the bedside. All questions were answered and will plan for pouch change and ongoing education tomorrow.

## 2023-05-25 NOTE — THERAPY EVALUATION
Patient Name: Cali Cavazos  : 1950    MRN: [id number]                              Today's Date: 2023       Admit Date: 2023    Visit Dx:     ICD-10-CM ICD-9-CM   1. Peritoneal carcinomatosis  C78.6 197.6   2. Abdominal pain, unspecified abdominal location  R10.9 789.00   3. Hyponatremia  E87.1 276.1   4. Abnormal CT of the abdomen  R93.5 793.6   5. Abdominal distension  R14.0 787.3   6. Prostate cancer  C61 185   7. Liver lesion  K76.9 573.8   8. Metastatic malignant neoplasm, unspecified site  C79.9 199.1   9. Colon obstruction  K56.609 560.9     Patient Active Problem List   Diagnosis   • Peritoneal carcinomatosis   • Abdominal pain   • Hyponatremia   • Abnormal CT of the abdomen   • Severe Malnutrition (HCC)   • Colon obstruction     Past Medical History:   Diagnosis Date   • Hypertension    • Prostate cancer      Past Surgical History:   Procedure Laterality Date   • BACK SURGERY      two back surgeries    • COLONOSCOPY     • COLONOSCOPY N/A 2023    Procedure: COLONOSCOPY with rectal biopsy's;  Surgeon: Shiraz Adamson MD;  Location: [facility] ENDOSCOPY;  Service: Gastroenterology;  Laterality: N/A;  high grade distal colonic stricture   • ENDOSCOPY N/A 2023    Procedure: ESOPHAGOGASTRODUODENOSCOPY;  Surgeon: Shiraz Adamson MD;  Location: [facility] ENDOSCOPY;  Service: Gastroenterology;  Laterality: N/A;  GERD      General Information     Row Name 23 1525          OT Time and Intention    Document Type evaluation  -LS     Mode of Treatment occupational therapy  -LS     Row Name 23 1525          General Information    Patient Profile Reviewed yes  -LS     Prior Level of Function independent:;ADL's;driving;yard work;all household mobility  -LS     Row Name 23 1525          Living Environment    People in Home spouse  -LS     Row Name 23 1525          Home Main Entrance    Number of Stairs, Main Entrance three  -LS     Row Name 23  1525          Stairs Within Home, Primary    Number of Stairs, Within Home, Primary none  -     Row Name 05/25/23 1525          Cognition    Orientation Status (Cognition) oriented to;person;situation;verbal cues/prompts needed for orientation;time;place  -     Row Name 05/25/23 1525          Safety Issues, Functional Mobility    Impairments Affecting Function (Mobility) pain;endurance/activity tolerance  -           User Key  (r) = Recorded By, (t) = Taken By, (c) = Cosigned By    Initials Name Provider Type     Rodney Fowler OT Occupational Therapist                 Mobility/ADL's     Row Name 05/25/23 1527          Bed Mobility    Bed Mobility bed mobility (all) activities  -     All Activities, Neon (Bed Mobility) modified independence  -     Row Name 05/25/23 1527          Transfers    Transfers sit-stand transfer;bed-chair transfer  -     Row Name 05/25/23 1527          Bed-Chair Transfer    Bed-Chair Neon (Transfers) contact guard  -     Assistive Device (Bed-Chair Transfers) walker, front-wheeled  -     Row Name 05/25/23 1527          Sit-Stand Transfer    Sit-Stand Neon (Transfers) contact guard  -     Row Name 05/25/23 1527          Functional Mobility    Functional Mobility- Ind. Level contact guard assist  -LS     Functional Mobility- Device walker, front-wheeled  -LS     Row Name 05/25/23 1527          Activities of Daily Living    BADL Assessment/Intervention lower body dressing  -     Row Name 05/25/23 1527          Lower Body Dressing Assessment/Training    Neon Level (Lower Body Dressing) doff;don;socks;supervision  -     Position (Lower Body Dressing) edge of bed sitting  -           User Key  (r) = Recorded By, (t) = Taken By, (c) = Cosigned By    Initials Name Provider Type    LS Rodney Fowler OT Occupational Therapist               Obj/Interventions     Row Name 05/25/23 1529          Sensory Assessment (Somatosensory)    Sensory  Assessment (Somatosensory) sensation intact  -[facility] Name 05/25/23 1529          Range of Motion Comprehensive    General Range of Motion bilateral upper extremity ROM WFL  -[facility] Name 05/25/23 1529          Strength Comprehensive (MMT)    General Manual Muscle Testing (MMT) Assessment no strength deficits identified  -[facility] Name 05/25/23 1529          Balance    Balance Assessment sitting static balance;sitting dynamic balance;standing dynamic balance;standing static balance  -LS     Static Sitting Balance independent  -LS     Dynamic Sitting Balance independent  -LS     Position, Sitting Balance sitting edge of bed  -LS     Static Standing Balance standby assist  -LS     Dynamic Standing Balance contact guard  -LS     Position/Device Used, Standing Balance supported;walker, front-wheeled  -LS           User Key  (r) = Recorded By, (t) = Taken By, (c) = Cosigned By    Initials Name Provider Type    LS Rodney Fowler OT Occupational Therapist               Goals/Plan     [facility] Name 05/25/23 1541          Transfer Goal 1 (OT)    Activity/Assistive Device (Transfer Goal 1, OT) transfers, all  -LS     [place] Level/Cues Needed (Transfer Goal 1, OT) independent  -LS     Time Frame (Transfer Goal 1, OT) long term goal (LTG);2 weeks  -[facility] Name 05/25/23 1541          Bathing Goal 1 (OT)    Activity/Device (Bathing Goal 1, OT) bathing skills, all  -LS     [place] Level/Cues Needed (Bathing Goal 1, OT) modified independence  -LS     Time Frame (Bathing Goal 1, OT) long term goal (LTG);2 weeks  -[facility] Name 05/25/23 1541          Dressing Goal 1 (OT)    Activity/Device (Dressing Goal 1, OT) dressing skills, all  -LS     Alger/Cues Needed (Dressing Goal 1, OT) modified independence  -LS     Time Frame (Dressing Goal 1, OT) long term goal (LTG);2 weeks  -[facility] Name 05/25/23 1541          Toileting Goal 1 (OT)    Activity/Device (Toileting Goal 1, OT) toileting skills, all  -LS      [place] Level/Cues Needed (Toileting Goal 1, OT) modified independence  -LS     Time Frame (Toileting Goal 1, OT) long term goal (LTG);2 weeks  -LS     Row Name 05/25/23 1543          Therapy Assessment/Plan (OT)    Planned Therapy Interventions (OT) activity tolerance training;BADL retraining;functional balance retraining;IADL retraining;ROM/therapeutic exercise;strengthening exercise;transfer/mobility retraining  -LS           User Key  (r) = Recorded By, (t) = Taken By, (c) = Cosigned By    Initials Name Provider Type    LS Rodney Fowler, HANNAH Occupational Therapist               Clinical Impression     Row Name 05/25/23 1532          Pain Assessment    Pretreatment Pain Rating 8/10  -LS     Posttreatment Pain Rating 8/10  -LS     Pain Location incisional  -LS     Pain Location - abdomen  -LS     Pain Intervention(s) Repositioned  -LS     Row Name 05/25/23 1531          Plan of Care Review    Plan of Care Reviewed With patient;spouse  -LS     Outcome Evaluation 72-year-old male presented to the ED with constipation.  He has had poor appetite for over a year (with 30 pound weight loss). He has recently been diagnosed with metastatic cancer of unknown primary.  He reported no cough or shortness of breath.  Family reported he has been declining rapidly.  He has had history of prostate cancer treated with radiation last year from February to May. He was found on CT scan to have evidence of peritoneal carcinomatosis. He underwent endoscopic evaluation of the rectum was found to have obstruction. Exploratory laparotomy with formation of ostomy completed 5/24/23. Patient oriented x2, easily reoriented to time and place. He reports normally being very independent, completing ADLs and functional mobility without assistance. He is typically active and performs yard work and is an active . He reports pain 8/10, not increased with activity. He is Mod I to come to sitting EOB, and CGA to stand and ambulate with RW.  Setup provided for lower body dressing. OT expects pt to progress well postoperatively, recommending HHOT at dc.  -     Row Name 05/25/23 1532          Therapy Assessment/Plan (OT)    Rehab Potential (OT) good, to achieve stated therapy goals  -     Criteria for Skilled Therapeutic Interventions Met (OT) yes;skilled treatment is necessary  -LS     Therapy Frequency (OT) 3 times/wk  -LS     Predicted Duration of Therapy Intervention (OT) until dc  -     Row Name 05/25/23 1532          Therapy Plan Review/Discharge Plan (OT)    Anticipated Discharge Disposition (OT) home with home health  -     Row Name 05/25/23 1532          Vital Signs    O2 Delivery Pre Treatment room air  -LS     O2 Delivery Intra Treatment room air  -LS     O2 Delivery Post Treatment room air  -LS     Pre Patient Position Supine  -LS     Intra Patient Position Standing  -LS     Post Patient Position Sitting  -LS           User Key  (r) = Recorded By, (t) = Taken By, (c) = Cosigned By    Initials Name Provider Type    Rodney Reno OT Occupational Therapist               Outcome Measures     Row Name 05/25/23 1542          How much help from another is currently needed...    Putting on and taking off regular lower body clothing? 3  -LS     Bathing (including washing, rinsing, and drying) 3  -LS     Toileting (which includes using toilet bed pan or urinal) 3  -LS     Putting on and taking off regular upper body clothing 4  -LS     Taking care of personal grooming (such as brushing teeth) 4  -LS     Eating meals 4  -LS     AM-PAC 6 Clicks Score (OT) 21  -LS     Row Name 05/25/23 1542          Functional Assessment    Outcome Measure Options AM-PAC 6 Clicks Daily Activity (OT)  -LS           User Key  (r) = Recorded By, (t) = Taken By, (c) = Cosigned By    Initials Name Provider Type    Rodney Reno OT Occupational Therapist                Occupational Therapy Education     Title: PT OT SLP Therapies (Done)     Topic: Occupational  Therapy (Done)     Point: ADL training (Done)     Description:   Instruct learner(s) on proper safety adaptation and remediation techniques during self care or transfers.   Instruct in proper use of assistive devices.              Learning Progress Summary           Patient Acceptance, E,TB, VU by DESMOND at 5/25/2023 1543                               User Key     Initials Effective Dates Name Provider Type Discipline    DESMOND 09/22/22 -  Rodney Fowler OT Occupational Therapist OT              OT Recommendation and Plan  Planned Therapy Interventions (OT): activity tolerance training, BADL retraining, functional balance retraining, IADL retraining, ROM/therapeutic exercise, strengthening exercise, transfer/mobility retraining  Therapy Frequency (OT): 3 times/wk  Plan of Care Review  Plan of Care Reviewed With: patient, spouse  Outcome Evaluation: 72-year-old male presented to the ED with constipation.  He has had poor appetite for over a year (with 30 pound weight loss). He has recently been diagnosed with metastatic cancer of unknown primary.  He reported no cough or shortness of breath.  Family reported he has been declining rapidly.  He has had history of prostate cancer treated with radiation last year from February to May. He was found on CT scan to have evidence of peritoneal carcinomatosis. He underwent endoscopic evaluation of the rectum was found to have obstruction. Exploratory laparotomy with formation of ostomy completed 5/24/23. Patient oriented x2, easily reoriented to time and place. He reports normally being very independent, completing ADLs and functional mobility without assistance. He is typically active and performs yard work and is an active . He reports pain 8/10, not increased with activity. He is Mod I to come to sitting EOB, and CGA to stand and ambulate with RW. Setup provided for lower body dressing. OT expects pt to progress well postoperatively, recommending HHOT at dc.     Time  Calculation:    Time Calculation- OT     Row Name 05/25/23 1543             Time Calculation- OT    OT Start Time 1433  -LS      OT Stop Time 1456  -LS      OT Time Calculation (min) 23 min  -LS      OT Received On 05/25/23  -      OT - Next Appointment 05/26/23  -      OT Goal Re-Cert Due Date 06/08/23  -         Untimed Charges    OT Eval/Re-eval Minutes 23  -LS         Total Minutes    Untimed Charges Total Minutes 23  -LS       Total Minutes 23  -LS            User Key  (r) = Recorded By, (t) = Taken By, (c) = Cosigned By    Initials Name Provider Type    Rodney Reno OT Occupational Therapist              Therapy Charges for Today     Code Description Service Date Service Provider Modifiers Qty    [id number] HC OT EVAL MOD COMPLEXITY 4 5/25/2023 Rodney Fowler OT GO 1               Rodney Fowler OT  5/25/2023

## 2023-05-25 NOTE — CASE MANAGEMENT/SOCIAL WORK
Continued Stay Note  [facility]     Patient Name: Cali Cavazos  MRN: [id number]  Today's Date: 5/25/2023    Admit Date: 5/21/2023    Plan: Anticipate routine home with spouse. PT eval pending.   Discharge Plan     Row Name 05/25/23 1505       Plan    Plan Anticipate routine home with spouse. PT eval pending.    Plan Comments Barrier to D/C: POD#1 colostomy, IV abx, full liquids, IVFs, PT pending.                    Expected Discharge Date and Time     Expected Discharge Date Expected Discharge Time    May 29, 2023         Phone communication or documentation only - no physical contact with patient or family.    MERLENE MaddoxN, RN    [address] [address]    Office: [phone]  Fax: [phone]

## 2023-05-25 NOTE — PROGRESS NOTES
Hematology/Oncology Inpatient Progress Note    PATIENT NAME: Cali Cavazos  : 1950  MRN: [id number]    CHIEF COMPLAINT: Metastatic malignancy.     HISTORY OF PRESENT ILLNESS:      5/15/2023: Mr. Cavazos was referred for the investigation and treatment of what appeared to be a metastatic malignancy. His symptoms had started at the end of  or the beginning of . He first noted a loss of appetite. More important, however, he had dysgeusia. This led slowly to a significant reduction in the amount he ate and consequently he had a drop in his weight. Slowly he developed low back pain that became progressively more intense. His dysgeusia became more severe. He had imaging of the abdomen that reported findings consistent with metastatic disease involving lymph nodes in the ben hepatis, mid abdominal mesentery and the mid abdominal retroperitoneum. Some suggestion of peritoneal carcinomatosis was also present. But more important, there were several nodules in the liver, the largest of which was about 1.8 cm. No obvious source was present, though a non-specific lesion measuring 1.5 cm was present in the mid left kidney. Also a change in caliber of the colon near the level of the rectum. The exam revealed him to be a slender man in no distress. No jaundice and not ill or in distress. No jaundice. No oral lesions and no palpable adenopathy. Lungs diminished. The liver appeared enlarged to palpation of the abdomen; at the the level of the mid clavicular line it seemed to be approximately 5 cm below the costal margin and it was hard and nodular. Family history was not contributory to the present illness. A decision was made to obtain biopsy of the liver.      2023: Mr. Cavazos came to the emergency department complaining of progressive loss of his sense of well being. Profound anorexia and inability to eat anything were other prominent symptoms. As well, he was having abdominal pain. He was  admitted for further investigations.     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 A colonoscopy could not be done. He had an ileostomy created.     Subjective   5/24/2023: Better. Able to take liquids. No pain.     ROS:  Review of Systems   Constitutional: Positive for activity change, appetite change, fatigue and unexpected weight change. Negative for chills, diaphoresis and fever.   HENT: Negative for congestion, dental problem, drooling, ear discharge, ear pain, facial swelling, hearing loss, mouth sores, nosebleeds, postnasal drip, rhinorrhea, sinus pressure, sinus pain, sneezing, sore throat, tinnitus, trouble swallowing and voice change.    Eyes: Negative for photophobia, pain, discharge, redness, itching and visual disturbance.   Respiratory: Negative for apnea, cough, choking, chest tightness, shortness of breath, wheezing and stridor.    Cardiovascular: Negative for chest pain, palpitations and leg swelling.   Gastrointestinal: Positive for abdominal pain. Negative for abdominal distention, anal bleeding, blood in stool, constipation, diarrhea, nausea, rectal pain and vomiting.   Endocrine: Negative for cold intolerance, heat intolerance, polydipsia and polyuria.   Genitourinary: Negative for decreased urine volume, difficulty urinating, dysuria, flank pain, frequency, genital sores, hematuria and urgency.   Musculoskeletal: Negative for arthralgias, back pain, gait problem, joint swelling, myalgias, neck pain and neck stiffness.   Skin: Negative for color change, pallor and rash.   Neurological: Negative for dizziness, tremors, seizures, syncope, facial asymmetry, speech difficulty, weakness, light-headedness, numbness and headaches.   Hematological: Negative for adenopathy. Does not bruise/bleed easily.   Psychiatric/Behavioral: Negative for agitation, behavioral problems, confusion, decreased concentration,  "hallucinations, self-injury, sleep disturbance and suicidal ideas. The patient is not nervous/anxious.         MEDICATIONS:    Scheduled Meds:  First Mouthwash (Magic Mouthwash), 5 mL, Swish & Spit, Q6H  lisinopril, 40 mg, Oral, Daily  melatonin, 5 mg, Oral, Nightly  metoprolol tartrate, 50 mg, Oral, BID With Meals  mirtazapine, 7.5 mg, Oral, Nightly  polyethylene glycol, 17 g, Oral, Daily  risperiDONE, 1 mg, Oral, Nightly  sodium chloride, 10 mL, Intravenous, Q12H  sorbitol, 50 mL, Oral, Once       Continuous Infusions:  niCARdipine, 5-15 mg/hr  phenylephrine, 0.5-3 mcg/kg/min  sodium chloride, 75 mL/hr, Last Rate: 75 mL/hr (05/25/23 0244)       PRN Meds:  •  acetaminophen  •  [DISCONTINUED] senna-docusate sodium **AND** [DISCONTINUED] polyethylene glycol **AND** [DISCONTINUED] bisacodyl **AND** bisacodyl  •  HYDROmorphone  •  LORazepam  •  Magnesium Standard Dose Replacement - Follow Nurse / BPA Driven Protocol  •  Morphine  •  niCARdipine  •  nitroglycerin  •  ondansetron **OR** ondansetron  •  oxyCODONE  •  phenylephrine  •  [COMPLETED] Insert Peripheral IV **AND** sodium chloride  •  sodium chloride     ALLERGIES:    Allergies   Allergen Reactions   • Penicillins Hives     Tolerated cefepime for 5/21/23 admission     Objective    VITALS:   /91 (BP Location: Right arm, Patient Position: Lying)   Pulse 87   Temp 98.2 °F (36.8 °C) (Oral)   Resp 18   Ht 182.9 cm (72\")   Wt 70.5 kg (155 lb 6.8 oz)   SpO2 97%   BMI 21.08 kg/m²     PHYSICAL EXAM: (performed by MD)  Physical Exam  Constitutional:       General: He is not in acute distress.     Appearance: He is ill-appearing. He is not toxic-appearing or diaphoretic.   HENT:      Head: Normocephalic and atraumatic.      Right Ear: External ear normal.      Left Ear: External ear normal.      Nose: Nose normal.      Mouth/Throat:      Mouth: Mucous membranes are moist.      Pharynx: Oropharynx is clear.   Eyes:      General: No scleral icterus.        Right " eye: No discharge.         Left eye: No discharge.      Conjunctiva/sclera: Conjunctivae normal.      Pupils: Pupils are equal, round, and reactive to light.   Cardiovascular:      Rate and Rhythm: Normal rate and regular rhythm.      Pulses: Normal pulses.      Heart sounds: Normal heart sounds. No murmur heard.    No friction rub. No gallop.   Pulmonary:      Effort: No respiratory distress.      Breath sounds: No stridor. No wheezing, rhonchi or rales.   Chest:      Chest wall: No tenderness.   Abdominal:      General: Abdomen is flat. Bowel sounds are normal. There is no distension.      Palpations: Abdomen is soft. There is no mass.      Tenderness: There is no abdominal tenderness. There is no right CVA tenderness, left CVA tenderness, guarding or rebound.   Musculoskeletal:         General: No swelling, tenderness, deformity or signs of injury.      Cervical back: No rigidity.      Right lower leg: No edema.      Left lower leg: No edema.   Lymphadenopathy:      Cervical: No cervical adenopathy.   Skin:     General: Skin is warm and dry.      Coloration: Skin is not jaundiced.      Findings: No bruising or rash.   Neurological:      General: No focal deficit present.      Mental Status: He is alert and oriented to person, place, and time.      Gait: Gait normal.   Psychiatric:         Mood and Affect: Mood normal.         Behavior: Behavior normal.         Thought Content: Thought content normal.         Judgment: Judgment normal.     ARELIS Jacob MD performed the physical exam on 5/25/2023 as documented above.     RECENT LABS:  Lab Results (last 24 hours)     Procedure Component Value Units Date/Time    Tissue Pathology Exam [[id number]] Collected: 05/23/23 1600    Specimen: Tissue from Large Intestine, Rectum Updated: 05/25/23 1600     Case Report --     Surgical Pathology Report                         Case: [id number]                                  Authorizing Provider:  Shiraz Adamson MD  "Collected:           05/23/2023 04:00 PM          Ordering Location:     [facility]  Received:            05/24/2023 10:55 AM                                 SUITES                                                                       Pathologist:           Rishi Heath MD                                                             Specimen:    Large Intestine, Rectum, rectal mass                                                        Final Diagnosis --     Rectal mass, biopsy:    Reactive chronically inflamed colonic mucosa with focal hyperplastic change, see comment     No evidence of malignancy    JPR/sms        Comment --     Multiple deeper levels have been examined and no dysplasia or malignancy is identified in this sample. Clinical and endoscopic correlation is recommended.     JPR/sms        Gross Description --     1. Large Intestine, Rectum.  Received in formalin designated \"Rectal mass\" are several fragments of whitish tissue measuring 0.2 cm in greatest dimension, submitted in one cassette.     AFSANEH/tkd         Tissue Pathology Exam [[id number]] Collected: 05/24/23 1640    Specimen: Tissue from Omentum Updated: 05/25/23 1104    Phosphorus [[id number]]  (Normal) Collected: 05/24/23 2258    Specimen: Blood Updated: 05/25/23 0000     Phosphorus 2.9 mg/dL     Magnesium [[id number]]  (Normal) Collected: 05/24/23 2258    Specimen: Blood Updated: 05/25/23 0000     Magnesium 2.3 mg/dL     Basic Metabolic Panel [[id number]]  (Abnormal) Collected: 05/24/23 2258    Specimen: Blood Updated: 05/24/23 2354     Glucose 184 mg/dL      BUN 18 mg/dL      Creatinine 0.80 mg/dL      Sodium 135 mmol/L      Potassium 4.0 mmol/L      Chloride 101 mmol/L      CO2 22.0 mmol/L      Calcium 8.9 mg/dL      BUN/Creatinine Ratio 22.5     Anion Gap 12.0 mmol/L      eGFR 94.0 mL/min/1.73     Narrative:      GFR Normal >60  Chronic Kidney Disease <60  Kidney Failure <15    The GFR formula is only valid for adults with stable " renal function between ages 18 and 70.    CBC & Differential [[id number]]  (Abnormal) Collected: 05/24/23 2258    Specimen: Blood Updated: 05/24/23 2335    Narrative:      The following orders were created for panel order CBC & Differential.  Procedure                               Abnormality         Status                     ---------                               -----------         ------                     CBC Auto Differential[[id number]]        Abnormal            Final result                 Please view results for these tests on the individual orders.    CBC Auto Differential [[id number]]  (Abnormal) Collected: 05/24/23 2258    Specimen: Blood Updated: 05/24/23 2335     WBC 14.00 10*3/mm3      RBC 3.71 10*6/mm3      Hemoglobin 11.0 g/dL      Hematocrit 32.3 %      MCV 87.1 fL      MCH 29.6 pg      MCHC 34.0 g/dL      RDW 13.3 %      RDW-SD 42.9 fl      MPV 7.7 fL      Platelets 281 10*3/mm3      Neutrophil % 94.7 %      Lymphocyte % 1.8 %      Monocyte % 3.3 %      Eosinophil % 0.0 %      Basophil % 0.2 %      Neutrophils, Absolute 13.20 10*3/mm3      Lymphocytes, Absolute 0.30 10*3/mm3      Monocytes, Absolute 0.50 10*3/mm3      Eosinophils, Absolute 0.00 10*3/mm3      Basophils, Absolute 0.00 10*3/mm3      nRBC 0.0 /100 WBC     Blood Culture - Blood, Hand, Left [[id number]]  (Normal) Collected: 05/23/23 2238    Specimen: Blood from Hand, Left Updated: 05/24/23 2301     Blood Culture No growth at 24 hours    Narrative:      Less than seven (7) mL's of blood was collected.  Insufficient quantity may yield false negative results.        IMAGING REVIEWED:  CT Abdomen Pelvis Without Contrast    Result Date: 5/23/2023  1.Interval development of patchy infiltrates in lingula and bilateral lower lobes. Small bilateral pleural effusions. 2.Unchanged innumerable ill-defined metastatic lesions throughout hepatic parenchyma. 3.Unchanged extensive portacaval, retroperitoneal, periaortic, mesenteric, and pelvic  metastatic lymphadenopathy. Unchanged extensive mesenteric carcinomatosis/omental caking. Moderate ascites throughout abdomen and pelvis, relatively unchanged. 4.Unchanged lucent lesion within L2 vertebral body. 5.Unchanged focal wall thickening involving rectosigmoid junction which may represent focal colitis, however, unable to exclude rectal malignancy. Follow-up recommended. 6.Unchanged 1.6 cm hyperdense lesion in left kidney. Nonemergent MRI can be performed for further evaluation and to exclude renal malignancy. 7.Unchanged moderate ascites throughout abdomen and pelvis. 8.Urinary bladder wall thickening may represent cystitis or other etiologies. Please correlate with urinalysis. Follow-up recommended. Electronically Signed: Vladimir Flynn  5/23/2023 10:00 PM EDT  Workstation ID: [id number]    CT Head Without Contrast    Result Date: 5/23/2023  1.No acute intracranial hemorrhage. Calvarium is intact. Electronically Signed: Vladimir Flynn  5/23/2023 5:38 PM EDT  Workstation ID: [id number]    US Guided Liver Biopsy    Result Date: 5/24/2023  1. Successful ultrasound-guided biopsy of liver lesion Electronically Signed: Marshall Mcadams  5/24/2023 11:59 AM EDT  Workstation ID: [id number]      Assessment & Plan   ASSESSMENT:  1. Metastatic malignancy. Awaiting the final report of pathology. Discussed with him and his family.     PLAN:  1. As above.     Jonathan Jacob MD on 5/25/2023 at 16:58

## 2023-05-25 NOTE — THERAPY EVALUATION
Patient Name: Cali Cavazos  : 1950    MRN: [id number]                              Today's Date: 2023       Admit Date: 2023    Visit Dx:     ICD-10-CM ICD-9-CM   1. Peritoneal carcinomatosis  C78.6 197.6   2. Abdominal pain, unspecified abdominal location  R10.9 789.00   3. Hyponatremia  E87.1 276.1   4. Abnormal CT of the abdomen  R93.5 793.6   5. Abdominal distension  R14.0 787.3   6. Prostate cancer  C61 185   7. Liver lesion  K76.9 573.8   8. Metastatic malignant neoplasm, unspecified site  C79.9 199.1   9. Colon obstruction  K56.609 560.9     Patient Active Problem List   Diagnosis   • Peritoneal carcinomatosis   • Abdominal pain   • Hyponatremia   • Abnormal CT of the abdomen   • Severe Malnutrition (HCC)   • Colon obstruction     Past Medical History:   Diagnosis Date   • Hypertension    • Prostate cancer      Past Surgical History:   Procedure Laterality Date   • BACK SURGERY      two back surgeries    • COLONOSCOPY     • COLONOSCOPY N/A 2023    Procedure: COLONOSCOPY with rectal biopsy's;  Surgeon: Shiraz Adamson MD;  Location: [facility] ENDOSCOPY;  Service: Gastroenterology;  Laterality: N/A;  high grade distal colonic stricture   • ENDOSCOPY N/A 2023    Procedure: ESOPHAGOGASTRODUODENOSCOPY;  Surgeon: Shiraz Adamson MD;  Location: [facility] ENDOSCOPY;  Service: Gastroenterology;  Laterality: N/A;  GERD      General Information     Row Name 23 1557          Physical Therapy Time and Intention    Document Type evaluation  -CL     Mode of Treatment physical therapy  -CL     Row Name 23 1557          General Information    Patient Profile Reviewed yes  -CL     Prior Level of Function independent:;ADL's;community mobility;gait;driving;yard work  -CL     Existing Precautions/Restrictions fall;other (see comments)  clear liquids  -CL     Barriers to Rehab none identified  -CL     Row Name 23 1553          Living Environment    People in Home  spouse  -CL     Name(s) of People in Home Wife: Mae  -CL     Row Name 05/25/23 1557          Home Main Entrance    Number of Stairs, Main Entrance three  -CL     Row Name 05/25/23 1557          Stairs Within Home, Primary    Number of Stairs, Within Home, Primary none  -CL     Row Name 05/25/23 1557          Cognition    Orientation Status (Cognition) oriented to;person;situation;verbal cues/prompts needed for orientation;time;place  -CL     Row Name 05/25/23 1557          Safety Issues, Functional Mobility    Impairments Affecting Function (Mobility) pain;endurance/activity tolerance  -CL           User Key  (r) = Recorded By, (t) = Taken By, (c) = Cosigned By    Initials Name Provider Type    CL Janis Irene, PT Physical Therapist               Mobility     Row Name 05/25/23 1559          Bed Mobility    Bed Mobility supine-sit  -CL     All Activities, [place] (Bed Mobility) modified independence  -CL     Row Name 05/25/23 1559          Bed-Chair Transfer    Bed-Chair [place] (Transfers) contact guard  -CL     Assistive Device (Bed-Chair Transfers) walker, front-wheeled  -CL     Row Name 05/25/23 1559          Sit-Stand Transfer    Sit-Stand [place] (Transfers) contact guard  -CL     Row Name 05/25/23 1559          Gait/Stairs (Locomotion)    [place] Level (Gait) contact guard  -CL     Distance in Feet (Gait) 40'  -CL           User Key  (r) = Recorded By, (t) = Taken By, (c) = Cosigned By    Initials Name Provider Type    CL Janis Irene, PT Physical Therapist               Obj/Interventions     Row Name 05/25/23 1559          Range of Motion Comprehensive    General Range of Motion bilateral lower extremity ROM WNL  -CL     Row Name 05/25/23 1559          Strength Comprehensive (MMT)    General Manual Muscle Testing (MMT) Assessment no strength deficits identified  -CL     Row Name 05/25/23 1559          Balance    Balance Assessment sitting dynamic balance  -CL     Static  Sitting Balance independent  -CL     Dynamic Sitting Balance independent  -CL     Position, Sitting Balance sitting edge of bed  -CL     Static Standing Balance contact guard  -CL     Dynamic Standing Balance contact guard  -CL     Row Name 05/25/23 1554          Sensory Assessment (Somatosensory)    Sensory Assessment (Somatosensory) sensation intact  -CL           User Key  (r) = Recorded By, (t) = Taken By, (c) = Cosigned By    Initials Name Provider Type    CL Janis Irene, PT Physical Therapist               Goals/Plan     Row Name 05/25/23 160          Bed Mobility Goal 1 (PT)    Activity/Assistive Device (Bed Mobility Goal 1, PT) bed mobility activities, all  -CL     [place] Level/Cues Needed (Bed Mobility Goal 1, PT) independent  -CL     Time Frame (Bed Mobility Goal 1, PT) long term goal (LTG);2 weeks  -CL     Row Name 05/25/23 1604          Transfer Goal 1 (PT)    Activity/Assistive Device (Transfer Goal 1, PT) sit-to-stand/stand-to-sit;bed-to-chair/chair-to-bed  -CL     [place] Level/Cues Needed (Transfer Goal 1, PT) modified independence  -CL     Time Frame (Transfer Goal 1, PT) long term goal (LTG);2 weeks  -CL     Row Name 05/25/23 1604          Gait Training Goal 1 (PT)    [place] Level (Gait Training Goal 1, PT) independent  -CL     Distance (Gait Training Goal 1, PT) 200'  -CL     Time Frame (Gait Training Goal 1, PT) long term goal (LTG);2 weeks  -CL     Row Name 05/25/23 1604          Stairs Goal 1 (PT)    Activity/Assistive Device (Stairs Goal 1, PT) ascending stairs;descending stairs  -CL     Number of Stairs (Stairs Goal 1, PT) 4  -CL     Time Frame (Stairs Goal 1, PT) long term goal (LTG);2 weeks  -CL     Row Name 05/25/23 1601          Therapy Assessment/Plan (PT)    Planned Therapy Interventions (PT) balance training;bed mobility training;gait training;patient/family education;stair training;strengthening;transfer training  -CL           User Key  (r) = Recorded By, (t)  = Taken By, (c) = Cosigned By    Initials Name Provider Type    CL Janis Irene, PT Physical Therapist               Clinical Impression     Row Name 05/25/23 1600          Pain    Pretreatment Pain Rating 8/10  -CL     Posttreatment Pain Rating 8/10  -CL     Pain Location incisional  -CL     Pain Location - abdomen;back  -CL     Pain Intervention(s) Repositioned;Ambulation/increased activity  -CL     Row Name 05/25/23 1600          Plan of Care Review    Plan of Care Reviewed With patient;spouse;daughter  -     Outcome Evaluation Pt is a 72 y.o. male admitted with c/o constipation, abdominal pain and distention and rapid decline at home. He was found to have Peritoneal Carcinomatosis. Pt underwent Ileostomy 5/24 by Dr. Ambriz. PMH: Metstatic CA, HTN, prostate CA. At baseline, pt lives with wife in a H with 3 [place]RISTOF[place].  He is normally independent with ambulation, ADLs, completes yardwork and is an active .  At time of evaluation he is A&O x 2 with marked abdominal pain, however he is motivated to get up and move.  He performs bed mobility with CGA and extra time due to pain. He ambulates 40' with HHA.  He would benefit from PT while here.  Current recommendation is for  PT.  -     Row Name 05/25/23 1600          Therapy Assessment/Plan (PT)    Rehab Potential (PT) good, to achieve stated therapy goals  -CL     Criteria for Skilled Interventions Met (PT) yes  -CL     Therapy Frequency (PT) 5 times/wk  -CL     Predicted Duration of Therapy Intervention (PT) Until discharge  -CL     Row Name 05/25/23 1600          Vital Signs    Pre Systolic BP Rehab 101  -CL     Pre Treatment Diastolic BP 48  -CL     Intra Systolic BP Rehab 162  -CL     Intra Treatment Diastolic BP 92  -CL     Pretreatment Heart Rate (beats/min) 93  -CL     Intratreatment Heart Rate (beats/min) 100  -CL     Pre SpO2 (%) 95  -CL     O2 Delivery Pre Treatment room air  -CL     Intra SpO2 (%) 98  -CL     O2 Delivery Intra Treatment room air   -CL     Pre Patient Position Supine  -CL     Intra Patient Position Standing  -CL     Post Patient Position Sitting  -CL     Row Name 05/25/23 1600          Positioning and Restraints    Pre-Treatment Position in bed  -CL     Post Treatment Position chair  -CL     In Chair notified nsg;sitting;call light within reach;encouraged to call for assist;exit alarm on;with family/caregiver  -CL           User Key  (r) = Recorded By, (t) = Taken By, (c) = Cosigned By    Initials Name Provider Type    CL Janis Irene, PT Physical Therapist               Outcome Measures     Row Name 05/25/23 1605          How much help from another person do you currently need...    Turning from your back to your side while in flat bed without using bedrails? 4  -CL     Moving from lying on back to sitting on the side of a flat bed without bedrails? 3  -CL     Moving to and from a bed to a chair (including a wheelchair)? 3  -CL     Standing up from a chair using your arms (e.g., wheelchair, bedside chair)? 3  -CL     Climbing 3-5 steps with a railing? 2  -CL     To walk in hospital room? 3  -CL     AM-PAC 6 Clicks Score (PT) 18  -CL     Highest level of mobility 6 --> Walked 10 steps or more  -CL     Row Name 05/25/23 1605 05/25/23 1542       Functional Assessment    Outcome Measure Options AM-PAC 6 Clicks Basic Mobility (PT)  -CL AM-PAC 6 Clicks Daily Activity (OT)  -LS          User Key  (r) = Recorded By, (t) = Taken By, (c) = Cosigned By    Initials Name Provider Type    Rodney Reno, OT Occupational Therapist    CL Janis Irene, PT Physical Therapist                             Physical Therapy Education     Title: PT OT SLP Therapies (Done)     Topic: Physical Therapy (Done)     Point: Mobility training (Done)     Learning Progress Summary           Patient Acceptance, E,TB, VU by CL at 5/25/2023 1606                   Point: Body mechanics (Done)     Learning Progress Summary           Patient Acceptance, E,TB, VU by  CL at 5/25/2023 1606                   Point: Precautions (Done)     Learning Progress Summary           Patient Acceptance, E,TB, VU by CL at 5/25/2023 1606                               User Key     Initials Effective Dates Name Provider Type Discipline     03/02/22 -  Janis Irene, PT Physical Therapist PT              PT Recommendation and Plan  Planned Therapy Interventions (PT): balance training, bed mobility training, gait training, patient/family education, stair training, strengthening, transfer training  Plan of Care Reviewed With: patient, spouse, daughter  Outcome Evaluation: Pt is a 72 y.o. male admitted with c/o constipation, abdominal pain and distention and rapid decline at home. He was found to have Peritoneal Carcinomatosis. Pt underwent Ileostomy 5/24 by Dr. Ambriz. PMH: Metstatic CA, HTN, prostate CA. At baseline, pt lives with wife in a H with 3 [place].  He is normally independent with ambulation, ADLs, completes yardwork and is an active .  At time of evaluation he is A&O x 2 with marked abdominal pain, however he is motivated to get up and move.  He performs bed mobility with CGA and extra time due to pain. He ambulates 40' with HHA.  He would benefit from PT while here.  Current recommendation is for HH PT.     Time Calculation:    PT Charges     Row Name 05/25/23 1607             Time Calculation    Start Time 1433  -CL      Stop Time 1457  -CL      Time Calculation (min) 24 min  -CL      PT Received On 05/25/23  -CL      PT - Next Appointment 05/26/23  -CL      PT Goal Re-Cert Due Date 06/08/23  -CL         Time Calculation- PT    Total Timed Code Minutes- PT 10 minute(s)  -CL            User Key  (r) = Recorded By, (t) = Taken By, (c) = Cosigned By    Initials Name Provider Type    CL Janis Irene, PT Physical Therapist              Therapy Charges for Today     Code Description Service Date Service Provider Modifiers Qty    45972436196 HC GAIT TRAINING EA 15 MIN  5/25/2023 Janis Irene, PT GP 1    97135945473 HC PT EVAL MOD COMPLEXITY 4 5/25/2023 Janis Irene, PT GP 1          PT G-Codes  Outcome Measure Options: AM-PAC 6 Clicks Basic Mobility (PT)  AM-PAC 6 Clicks Score (PT): 18  AM-PAC 6 Clicks Score (OT): 21  PT Discharge Summary  Anticipated Discharge Disposition (PT): home with home health    Janis Irene, PT  5/25/2023

## 2023-05-25 NOTE — PROGRESS NOTES
LOS: 2 days   Patient Care Team:  William Crawford MD as PCP - General (Family Medicine)    Subjective     Patient is back to mental baseline and denies any complaints except abdominal pain with movement or coughin    Review of Systems   Constitutional: Positive for activity change, appetite change, fatigue and unexpected weight change.   HENT: Negative.    Respiratory: Negative.  Negative for shortness of breath.    Cardiovascular: Negative.    Gastrointestinal: Positive for abdominal distention and abdominal pain.   Genitourinary: Negative.    Musculoskeletal: Negative.    Neurological: Positive for weakness.   Psychiatric/Behavioral: Negative.            Objective     Vital Signs  Temp:  [97 °F (36.1 °C)-98.6 °F (37 °C)] 97.9 °F (36.6 °C)  Heart Rate:  [] 87  Resp:  [18-29] 18  BP: ()/(46-95) 143/88      Physical Exam  Vitals reviewed.   Constitutional:       Appearance: He is not ill-appearing.   HENT:      Head: Normocephalic and atraumatic.      Right Ear: External ear normal.      Left Ear: External ear normal.      Nose: Nose normal.      Mouth/Throat:      Mouth: Mucous membranes are dry.   Eyes:      General:         Right eye: No discharge.         Left eye: No discharge.   Cardiovascular:      Rate and Rhythm: Normal rate and regular rhythm.      Pulses: Normal pulses.   Pulmonary:      Effort: Pulmonary effort is normal.      Breath sounds: Normal breath sounds.   Abdominal:      General: Bowel sounds are normal. There is distension.   Musculoskeletal:         General: Normal range of motion.      Cervical back: Normal range of motion.   Skin:     General: Skin is warm.      Coloration: Skin is pale.   Neurological:      Mental Status: He is alert. He is disoriented.   Psychiatric:         Cognition and Memory: Cognition is impaired. Memory is impaired.              Results Review:    Lab Results (last 24 hours)     Procedure Component Value Units Date/Time    Phosphorus [[id number]]   (Normal) Collected: 05/24/23 2258    Specimen: Blood Updated: 05/25/23 0000     Phosphorus 2.9 mg/dL     Magnesium [[id number]]  (Normal) Collected: 05/24/23 2258    Specimen: Blood Updated: 05/25/23 0000     Magnesium 2.3 mg/dL     Basic Metabolic Panel [[id number]]  (Abnormal) Collected: 05/24/23 2258    Specimen: Blood Updated: 05/24/23 2354     Glucose 184 mg/dL      BUN 18 mg/dL      Creatinine 0.80 mg/dL      Sodium 135 mmol/L      Potassium 4.0 mmol/L      Chloride 101 mmol/L      CO2 22.0 mmol/L      Calcium 8.9 mg/dL      BUN/Creatinine Ratio 22.5     Anion Gap 12.0 mmol/L      eGFR 94.0 mL/min/1.73     Narrative:      GFR Normal >60  Chronic Kidney Disease <60  Kidney Failure <15    The GFR formula is only valid for adults with stable renal function between ages 18 and 70.    CBC & Differential [[id number]]  (Abnormal) Collected: 05/24/23 2258    Specimen: Blood Updated: 05/24/23 2335    Narrative:      The following orders were created for panel order CBC & Differential.  Procedure                               Abnormality         Status                     ---------                               -----------         ------                     CBC Auto Differential[[id number]]        Abnormal            Final result                 Please view results for these tests on the individual orders.    CBC Auto Differential [[id number]]  (Abnormal) Collected: 05/24/23 2258    Specimen: Blood Updated: 05/24/23 2335     WBC 14.00 10*3/mm3      RBC 3.71 10*6/mm3      Hemoglobin 11.0 g/dL      Hematocrit 32.3 %      MCV 87.1 fL      MCH 29.6 pg      MCHC 34.0 g/dL      RDW 13.3 %      RDW-SD 42.9 fl      MPV 7.7 fL      Platelets 281 10*3/mm3      Neutrophil % 94.7 %      Lymphocyte % 1.8 %      Monocyte % 3.3 %      Eosinophil % 0.0 %      Basophil % 0.2 %      Neutrophils, Absolute 13.20 10*3/mm3      Lymphocytes, Absolute 0.30 10*3/mm3      Monocytes, Absolute 0.50 10*3/mm3      Eosinophils, Absolute 0.00 10*3/mm3       Basophils, Absolute 0.00 10*3/mm3      nRBC 0.0 /100 WBC     Blood Culture - Blood, Hand, Left [[id number]]  (Normal) Collected: 05/23/23 2238    Specimen: Blood from Hand, Left Updated: 05/24/23 2301     Blood Culture No growth at 24 hours    Narrative:      Less than seven (7) mL's of blood was collected.  Insufficient quantity may yield false negative results.    MRSA Screen, PCR (Inpatient) - Swab, Nares [[id number]]  (Normal) Collected: 05/24/23 1129    Specimen: Swab from Nares Updated: 05/24/23 1252     MRSA PCR No MRSA Detected    Narrative:      The negative predictive value of this diagnostic test is high and should only be used to consider de-escalating anti-MRSA therapy. A positive result may indicate colonization with MRSA and must be correlated clinically.    Tissue Pathology Exam [[id number]] Collected: 05/24/23 1051    Specimen: Tissue from Liver Updated: 05/24/23 1105    Tissue Pathology Exam [[id number]] Collected: 05/23/23 1600    Specimen: Tissue from Large Intestine, Rectum Updated: 05/24/23 1055           Imaging Results (Last 24 Hours)     Procedure Component Value Units Date/Time    US Guided Liver Biopsy [[id number]] Collected: 05/24/23 1156     Updated: 05/24/23 1201    Narrative:      DATE OF EXAM:  5/24/2023 10:34 AM EDT    PROCEDURE:  US GUIDED LIVER BIOPSY    INDICATIONS:  Apparent metastatic disease.    COMPARISON:  No Comparisons Available    FLUOROSCOPIC TIME:  None    PHYSICIAN MONITORED CONSCIOUS SEDATION TIME:  8 minutes    TECHNIQUE:   A detailed explanation of the procedure, including the risks, benefits, and alternatives was provided. A preprocedure timeout was performed. The interventional radiology nurse mild the patient at all times and provided conscious sedation. The patient was   placed supine on the bed and the right upper quadrant was ultrasounded, demonstrating multiple hypoechoic liver lesions. An appropriate access site was selected and the area was prepped and draped in  the usual sterile fashion. The skin was anesthetized   with lidocaine. Next a 17-gauge coaxial needle was advanced to one of the lesions. From this position multiple 18-gauge core needle biopsies were obtained and given to the pathologist who noted the presence of diagnostic tissue. A Gelfoam slurry was   injected as the needle was removed. The patient tolerated the procedure well without immediate complication.    FINDINGS:  See above      Impression:        1. Successful ultrasound-guided biopsy of liver lesion      Electronically Signed: Marshall Mcadams    5/24/2023 11:59 AM EDT    Workstation ID: [id number]               I reviewed the patient's new clinical results.    Medication Review:   Scheduled Meds:First Mouthwash (Magic Mouthwash), 5 mL, Swish & Spit, Q6H  lisinopril, 40 mg, Oral, Daily  melatonin, 5 mg, Oral, Nightly  metoprolol tartrate, 50 mg, Oral, BID With Meals  mirtazapine, 7.5 mg, Oral, Nightly  polyethylene glycol, 17 g, Oral, Daily  risperiDONE, 1 mg, Oral, Nightly  sodium chloride, 10 mL, Intravenous, Q12H  sorbitol, 50 mL, Oral, Once      Continuous Infusions:niCARdipine, 5-15 mg/hr  phenylephrine, 0.5-3 mcg/kg/min  sodium chloride, 75 mL/hr, Last Rate: 75 mL/hr (05/25/23 0244)      PRN Meds:.•  acetaminophen  •  [DISCONTINUED] senna-docusate sodium **AND** [DISCONTINUED] polyethylene glycol **AND** [DISCONTINUED] bisacodyl **AND** bisacodyl  •  HYDROmorphone  •  LORazepam  •  Magnesium Standard Dose Replacement - Follow Nurse / BPA Driven Protocol  •  Morphine  •  niCARdipine  •  nitroglycerin  •  ondansetron **OR** ondansetron  •  oxyCODONE  •  phenylephrine  •  [COMPLETED] Insert Peripheral IV **AND** sodium chloride  •  sodium chloride     Interval History:    5/22 patient is to undergo EGD/colonoscopy today however due to chronic constipation and colonic burden patient is just now this morning beginning to clear bowels.  He has become confused per wife at bedside earlier this morning.  CEA is  normal at 25 elevated LFTs most likely due to liver mets oncology and GI following.  Also plans for biopsy of liver and paracentesis.  Continue supportive care and monitor.  We will discontinue IV fluids this morning and restart after scopes.  We will decrease Dilaudid, cont Ativan continue oxy and give risperidone at night see if this improves mentation. CT head in am and another ammonia level    5/25 patient had normal EGD and colonoscopy with proximal rectal stricture and partial likely high-grade colonic obstruction he was taken to surgery on 5/24 for loop colostomy peritoneal biopsy: during surgery the following inspection of the abdominal cavity he had firm nodularity of the entire omentum that encased the transverse colon.  There were uncountable nodules throughout the root of all the small bowel mesentery.  The peritoneal surfaces were all thickened and the ascending and descending colon were completely encased in the retroperitoneum by tumor.  The sigmoid colon was densely adherent to the peritoneal surfaces by tumor; await biopsy results; today patient is alert/oriented and understands all that has transpired; will continue support and current plan of care    Assessment & Plan     Peritoneal carcinoma ptosis  Liver metastasis  Ascites  Unintentional weight loss  Abdominal pain  Constipation  Abnormal CT showing peritoneal Gautam with ptosis, liver metastasis, liver left renal lesion, small quantity of ascites  Elevated LFTs  Mild normocytic anemia  Anorexia  Hypertension  History of prostate cancer    Plan for disposition:RALEIGH Latif  05/25/23  10:43 EDT

## 2023-05-25 NOTE — NURSING NOTE
Pt arrived back from having ileostomy placed around 1900, upon assessment dressing/ostomy appliance appears to be leaking informed family and they asked that an abd pad be placed and secured at this time. Wife and two daughters asked that it not be undressed until wound care can come assess and care for it then. Discussed with daughter in regards to dressing and she still insisted that it wait to be changed until tomorrow due to the area being tender and sore.

## 2023-05-25 NOTE — PROGRESS NOTES
"Chp welcomed. Pt/ spouse acknowledge likely terminal prognosis for pt. They are accepting, remain connected to God. Pt/spouse are looking to \"live all the time we've got left well.\" Large family, very supportive, strongly reliant on ines. Prayer and Mandaen support are important. Chp offered prayer as requested. Pt/spouse expressed gratitude. Chp to continue providing routine visits at least once weekly throughout pt's admission. Additional support available as needed.   "

## 2023-05-26 ENCOUNTER — READMISSION MANAGEMENT (OUTPATIENT)
Dept: CALL CENTER | Facility: HOSPITAL | Age: 73
End: 2023-05-26
Payer: MEDICARE

## 2023-05-26 ENCOUNTER — TELEPHONE (OUTPATIENT)
Dept: ONCOLOGY | Facility: CLINIC | Age: 73
End: 2023-05-26
Payer: MEDICARE

## 2023-05-26 ENCOUNTER — TELEPHONE (OUTPATIENT)
Dept: ONCOLOGY | Facility: CLINIC | Age: 73
End: 2023-05-26

## 2023-05-26 VITALS
DIASTOLIC BLOOD PRESSURE: 96 MMHG | RESPIRATION RATE: 24 BRPM | WEIGHT: 160.5 LBS | TEMPERATURE: 97.4 F | SYSTOLIC BLOOD PRESSURE: 156 MMHG | OXYGEN SATURATION: 97 % | HEART RATE: 103 BPM | HEIGHT: 72 IN | BODY MASS INDEX: 21.74 KG/M2

## 2023-05-26 PROBLEM — K59.09 OTHER CONSTIPATION: Status: ACTIVE | Noted: 2023-05-26

## 2023-05-26 PROBLEM — Z93.2 ILEOSTOMY IN PLACE: Status: ACTIVE | Noted: 2023-05-26

## 2023-05-26 PROBLEM — C78.7 METASTASIS TO LIVER: Status: ACTIVE | Noted: 2023-05-26

## 2023-05-26 PROBLEM — R79.89 ELEVATED LFTS: Status: ACTIVE | Noted: 2023-05-26

## 2023-05-26 PROBLEM — R63.0 ANOREXIA: Status: ACTIVE | Noted: 2023-05-26

## 2023-05-26 PROBLEM — D64.9 NORMOCYTIC ANEMIA: Status: ACTIVE | Noted: 2023-05-26

## 2023-05-26 LAB
ANION GAP SERPL CALCULATED.3IONS-SCNC: 10 MMOL/L (ref 5–15)
BASOPHILS # BLD AUTO: 0 10*3/MM3 (ref 0–0.2)
BASOPHILS NFR BLD AUTO: 0.3 % (ref 0–1.5)
BUN SERPL-MCNC: 19 MG/DL (ref 8–23)
BUN/CREAT SERPL: 27.5 (ref 7–25)
CALCIUM SPEC-SCNC: 9.4 MG/DL (ref 8.6–10.5)
CHLORIDE SERPL-SCNC: 100 MMOL/L (ref 98–107)
CO2 SERPL-SCNC: 24 MMOL/L (ref 22–29)
CREAT SERPL-MCNC: 0.69 MG/DL (ref 0.76–1.27)
DEPRECATED RDW RBC AUTO: 40.3 FL (ref 37–54)
EGFRCR SERPLBLD CKD-EPI 2021: 98.3 ML/MIN/1.73
EOSINOPHIL # BLD AUTO: 0.1 10*3/MM3 (ref 0–0.4)
EOSINOPHIL NFR BLD AUTO: 0.5 % (ref 0.3–6.2)
ERYTHROCYTE [DISTWIDTH] IN BLOOD BY AUTOMATED COUNT: 13.2 % (ref 12.3–15.4)
GLUCOSE SERPL-MCNC: 136 MG/DL (ref 65–99)
HCT VFR BLD AUTO: 32.3 % (ref 37.5–51)
HGB BLD-MCNC: 10.6 G/DL (ref 13–17.7)
LYMPHOCYTES # BLD AUTO: 0.6 10*3/MM3 (ref 0.7–3.1)
LYMPHOCYTES NFR BLD AUTO: 4.9 % (ref 19.6–45.3)
MAGNESIUM SERPL-MCNC: 1.7 MG/DL (ref 1.6–2.4)
MCH RBC QN AUTO: 28.6 PG (ref 26.6–33)
MCHC RBC AUTO-ENTMCNC: 32.9 G/DL (ref 31.5–35.7)
MCV RBC AUTO: 87 FL (ref 79–97)
MONOCYTES # BLD AUTO: 1.1 10*3/MM3 (ref 0.1–0.9)
MONOCYTES NFR BLD AUTO: 9.7 % (ref 5–12)
NEUTROPHILS NFR BLD AUTO: 84.6 % (ref 42.7–76)
NEUTROPHILS NFR BLD AUTO: 9.9 10*3/MM3 (ref 1.7–7)
NRBC BLD AUTO-RTO: 0.1 /100 WBC (ref 0–0.2)
PHOSPHATE SERPL-MCNC: 2.8 MG/DL (ref 2.5–4.5)
PLATELET # BLD AUTO: 308 10*3/MM3 (ref 140–450)
PMV BLD AUTO: 7.7 FL (ref 6–12)
POTASSIUM SERPL-SCNC: 3.8 MMOL/L (ref 3.5–5.2)
RBC # BLD AUTO: 3.71 10*6/MM3 (ref 4.14–5.8)
SODIUM SERPL-SCNC: 134 MMOL/L (ref 136–145)
WBC NRBC COR # BLD: 11.7 10*3/MM3 (ref 3.4–10.8)

## 2023-05-26 PROCEDURE — 25010000002 HYDROMORPHONE 1 MG/ML SOLUTION: Performed by: SURGERY

## 2023-05-26 PROCEDURE — 99232 SBSQ HOSP IP/OBS MODERATE 35: CPT | Performed by: INTERNAL MEDICINE

## 2023-05-26 PROCEDURE — 36415 COLL VENOUS BLD VENIPUNCTURE: CPT | Performed by: SURGERY

## 2023-05-26 PROCEDURE — 83735 ASSAY OF MAGNESIUM: CPT | Performed by: SURGERY

## 2023-05-26 PROCEDURE — 92526 ORAL FUNCTION THERAPY: CPT

## 2023-05-26 PROCEDURE — 80048 BASIC METABOLIC PNL TOTAL CA: CPT | Performed by: SURGERY

## 2023-05-26 PROCEDURE — 25010000002 MORPHINE PER 10 MG: Performed by: SURGERY

## 2023-05-26 PROCEDURE — 85025 COMPLETE CBC W/AUTO DIFF WBC: CPT | Performed by: SURGERY

## 2023-05-26 PROCEDURE — 84100 ASSAY OF PHOSPHORUS: CPT | Performed by: SURGERY

## 2023-05-26 RX ORDER — OXYCODONE HYDROCHLORIDE 5 MG/1
10 TABLET ORAL EVERY 4 HOURS PRN
Status: DISCONTINUED | OUTPATIENT
Start: 2023-05-26 | End: 2023-05-26 | Stop reason: HOSPADM

## 2023-05-26 RX ORDER — HEPARIN SODIUM 5000 [USP'U]/ML
5000 INJECTION, SOLUTION INTRAVENOUS; SUBCUTANEOUS EVERY 8 HOURS SCHEDULED
Status: DISCONTINUED | OUTPATIENT
Start: 2023-05-26 | End: 2023-05-26

## 2023-05-26 RX ORDER — HYDROCODONE BITARTRATE AND ACETAMINOPHEN 7.5; 325 MG/1; MG/1
1 TABLET ORAL EVERY 6 HOURS PRN
Status: DISCONTINUED | OUTPATIENT
Start: 2023-05-26 | End: 2023-05-26 | Stop reason: HOSPADM

## 2023-05-26 RX ORDER — DIPHENHYDRAMINE HYDROCHLORIDE AND LIDOCAINE HYDROCHLORIDE AND ALUMINUM HYDROXIDE AND MAGNESIUM HYDRO
5 KIT EVERY 6 HOURS
Qty: 360 ML | Refills: 1 | Status: SHIPPED | OUTPATIENT
Start: 2023-05-26

## 2023-05-26 RX ORDER — NALOXONE HYDROCHLORIDE 4 MG/.1ML
SPRAY NASAL
Qty: 2 EACH | Refills: 0 | Status: SHIPPED | OUTPATIENT
Start: 2023-05-26

## 2023-05-26 RX ORDER — LORAZEPAM 1 MG/1
1 TABLET ORAL EVERY 8 HOURS PRN
Qty: 10 TABLET | Refills: 0 | Status: SHIPPED | OUTPATIENT
Start: 2023-05-26

## 2023-05-26 RX ORDER — HYDROCODONE BITARTRATE AND ACETAMINOPHEN 7.5; 325 MG/1; MG/1
1 TABLET ORAL EVERY 6 HOURS PRN
Qty: 120 TABLET | Refills: 0 | Status: SHIPPED | OUTPATIENT
Start: 2023-05-26 | End: 2023-06-25

## 2023-05-26 RX ORDER — OXYCODONE HYDROCHLORIDE 10 MG/1
10 TABLET ORAL EVERY 6 HOURS PRN
Qty: 120 EACH | Refills: 0 | Status: SHIPPED | OUTPATIENT
Start: 2023-05-26 | End: 2023-06-25

## 2023-05-26 RX ORDER — LOPERAMIDE HYDROCHLORIDE 2 MG/1
2 CAPSULE ORAL 4 TIMES DAILY PRN
Status: DISCONTINUED | OUTPATIENT
Start: 2023-05-26 | End: 2023-05-26 | Stop reason: HOSPADM

## 2023-05-26 RX ADMIN — METOPROLOL TARTRATE 50 MG: 50 TABLET ORAL at 08:45

## 2023-05-26 RX ADMIN — HYDROMORPHONE HYDROCHLORIDE 0.5 MG: 1 INJECTION, SOLUTION INTRAMUSCULAR; INTRAVENOUS; SUBCUTANEOUS at 04:09

## 2023-05-26 RX ADMIN — SODIUM CHLORIDE 75 ML/HR: 9 INJECTION, SOLUTION INTRAVENOUS at 02:52

## 2023-05-26 RX ADMIN — DIPHENHYDRAMINE HYDROCHLORIDE AND LIDOCAINE HYDROCHLORIDE AND ALUMINUM HYDROXIDE AND MAGNESIUM HYDRO 5 ML: KIT at 08:41

## 2023-05-26 RX ADMIN — Medication 10 ML: at 08:51

## 2023-05-26 RX ADMIN — MORPHINE SULFATE 2 MG: 2 INJECTION, SOLUTION INTRAMUSCULAR; INTRAVENOUS at 00:34

## 2023-05-26 RX ADMIN — MORPHINE SULFATE 2 MG: 2 INJECTION, SOLUTION INTRAMUSCULAR; INTRAVENOUS at 11:50

## 2023-05-26 RX ADMIN — LISINOPRIL 40 MG: 20 TABLET ORAL at 08:45

## 2023-05-26 RX ADMIN — MORPHINE SULFATE 2 MG: 2 INJECTION, SOLUTION INTRAMUSCULAR; INTRAVENOUS at 09:41

## 2023-05-26 NOTE — DISCHARGE SUMMARY
Date of Discharge:  5/26/2023    Discharge Diagnosis:   Peritoneal carcinoma   Liver metastasis  High-grade colonic obstruction  Status post ileostomy  Unintentional weight loss  Anorexia  Hypertension  History of prostate cancer  Constipation  Abdominal pain  Elevated LFTs  Mild normocytic anemia    Presenting Problem/History of Present Illness  Active Hospital Problems    Diagnosis  POA   • **Peritoneal carcinomatosis [C78.6]  Yes   • Metastasis to liver [C78.7]  Yes   • Other constipation [K59.09]  Unknown   • Elevated LFTs [R79.89]  Yes   • Normocytic anemia [D64.9]  Yes   • Ileostomy in place [Z93.2]  Not Applicable   • Anorexia [R63.0]  Yes   • Severe Malnutrition (HCC) [E43]  Yes   • Abdominal pain [R10.9]  Yes   • Hyponatremia [E87.1]  Yes   • Abnormal CT of the abdomen [R93.5]  Yes   • Colonic obstruction [K56.609]  Yes      Resolved Hospital Problems   No resolved problems to display.          Hospital Course    Patient is a 72 y.o. male presented with history of hypertension, prostate cancer back surgery x2 who presented to the emergency department 5/21 with complaints of constipation abdominal pain.  He recently underwent an outpatient CT that showed metastatic disease in the liver.  He also reports the last bowel movement was approximately 2 weeks ago.  He is have abdominal distention nausea and vomiting. Patient had EGD/colonoscopy due to chronic constipation and colonic burden.  He had become confused prior to scopes.  On 5/24 he had had a normal EGD and colonoscopy with proximal rectal stricture and partial likely high-grade colonic obstruction he was taken to surgery on for loop colostomy peritoneal biopsy: during surgery the following inspection of the abdominal cavity he had firm nodularity of the entire omentum that encased the transverse colon. There were uncountable nodules throughout the root of all the small bowel mesentery.  The peritoneal surfaces were all thickened and the ascending and  descending colon were completely encased in the retroperitoneum by tumor.  The sigmoid colon was densely adherent to the peritoneal surfaces by tumor and he had an ileostomy.  Postsurgery patient has been alert and oriented back to baseline.  His supportive family and they have decided to at least discuss hospice today.  He will be discharged home he will need follow-up with PCP in 2 weeks and he is followed by oncology.  On discharge pathology is still pending.  He is agreeable to this plan at discharge.    Procedures Performed    Procedure(s):  COLONOSCOPY with rectal biopsy's  ESOPHAGOGASTRODUODENOSCOPY  -------------------    Procedure(s):  LOOP COLOSTOMY, PERITONEAL BIOPSY  -------------------  05/23 1540 COLONOSCOPY  05/23 1503 UPPER GI ENDOSCOPY    Consults:   Consults     Date and Time Order Name Status Description    5/24/2023 12:33 AM Inpatient General Surgery Consult Completed     5/22/2023  8:50 AM Hematology & Oncology Inpatient Consult Completed     5/22/2023  8:48 AM Inpatient Gastroenterology Consult Completed           Pertinent Test Results:    Lab Results (most recent)     Procedure Component Value Units Date/Time    Magnesium [[id number]]  (Normal) Collected: 05/26/23 0017    Specimen: Blood Updated: 05/26/23 0116     Magnesium 1.7 mg/dL     Basic Metabolic Panel [[id number]]  (Abnormal) Collected: 05/26/23 0017    Specimen: Blood Updated: 05/26/23 0107     Glucose 136 mg/dL      BUN 19 mg/dL      Creatinine 0.69 mg/dL      Sodium 134 mmol/L      Potassium 3.8 mmol/L      Chloride 100 mmol/L      CO2 24.0 mmol/L      Calcium 9.4 mg/dL      BUN/Creatinine Ratio 27.5     Anion Gap 10.0 mmol/L      eGFR 98.3 mL/min/1.73     Narrative:      GFR Normal >60  Chronic Kidney Disease <60  Kidney Failure <15    The GFR formula is only valid for adults with stable renal function between ages 18 and 70.    Phosphorus [[id number]]  (Normal) Collected: 05/26/23 0017    Specimen: Blood Updated: 05/26/23 0107      Phosphorus 2.8 mg/dL     CBC & Differential [[id number]]  (Abnormal) Collected: 05/26/23 0017    Specimen: Blood Updated: 05/26/23 0056    Narrative:      The following orders were created for panel order CBC & Differential.  Procedure                               Abnormality         Status                     ---------                               -----------         ------                     CBC Auto Differential[[id number]]        Abnormal            Final result                 Please view results for these tests on the individual orders.    CBC Auto Differential [[id number]]  (Abnormal) Collected: 05/26/23 0017    Specimen: Blood Updated: 05/26/23 0056     WBC 11.70 10*3/mm3      RBC 3.71 10*6/mm3      Hemoglobin 10.6 g/dL      Hematocrit 32.3 %      MCV 87.0 fL      MCH 28.6 pg      MCHC 32.9 g/dL      RDW 13.2 %      RDW-SD 40.3 fl      MPV 7.7 fL      Platelets 308 10*3/mm3      Neutrophil % 84.6 %      Lymphocyte % 4.9 %      Monocyte % 9.7 %      Eosinophil % 0.5 %      Basophil % 0.3 %      Neutrophils, Absolute 9.90 10*3/mm3      Lymphocytes, Absolute 0.60 10*3/mm3      Monocytes, Absolute 1.10 10*3/mm3      Eosinophils, Absolute 0.10 10*3/mm3      Basophils, Absolute 0.00 10*3/mm3      nRBC 0.1 /100 WBC     Blood Culture - Blood, Hand, Left [[id number]]  (Normal) Collected: 05/23/23 2238    Specimen: Blood from Hand, Left Updated: 05/25/23 2300     Blood Culture No growth at 2 days    Narrative:      Less than seven (7) mL's of blood was collected.  Insufficient quantity may yield false negative results.    Tissue Pathology Exam [[id number]] Collected: 05/23/23 1600    Specimen: Tissue from Large Intestine, Rectum Updated: 05/25/23 1600     Case Report --     Surgical Pathology Report                         Case: [id number]                                  Authorizing Provider:  Shiraz Adamson MD Collected:           05/23/2023 04:00 PM          Ordering Location:     [facility]   "Received:            05/24/2023 10:55 AM                                 SUITES                                                                       Pathologist:           Rishi Heath MD                                                             Specimen:    Large Intestine, Rectum, rectal mass                                                        Final Diagnosis --     Rectal mass, biopsy:    Reactive chronically inflamed colonic mucosa with focal hyperplastic change, see comment     No evidence of malignancy    JPR/sms        Comment --     Multiple deeper levels have been examined and no dysplasia or malignancy is identified in this sample. Clinical and endoscopic correlation is recommended.     JPR/sms        Gross Description --     1. Large Intestine, Rectum.  Received in formalin designated \"Rectal mass\" are several fragments of whitish tissue measuring 0.2 cm in greatest dimension, submitted in one cassette.     AFSANEH/d         Tissue Pathology Exam [[id number]] Collected: 05/24/23 1640    Specimen: Tissue from Omentum Updated: 05/25/23 1104    Phosphorus [[id number]]  (Normal) Collected: 05/24/23 2258    Specimen: Blood Updated: 05/25/23 0000     Phosphorus 2.9 mg/dL     Magnesium [[id number]]  (Normal) Collected: 05/24/23 2258    Specimen: Blood Updated: 05/25/23 0000     Magnesium 2.3 mg/dL     Basic Metabolic Panel [[id number]]  (Abnormal) Collected: 05/24/23 2258    Specimen: Blood Updated: 05/24/23 2354     Glucose 184 mg/dL      BUN 18 mg/dL      Creatinine 0.80 mg/dL      Sodium 135 mmol/L      Potassium 4.0 mmol/L      Chloride 101 mmol/L      CO2 22.0 mmol/L      Calcium 8.9 mg/dL      BUN/Creatinine Ratio 22.5     Anion Gap 12.0 mmol/L      eGFR 94.0 mL/min/1.73     Narrative:      GFR Normal >60  Chronic Kidney Disease <60  Kidney Failure <15    The GFR formula is only valid for adults with stable renal function between ages 18 and 70.    CBC & Differential [[id number]]  (Abnormal) Collected: " 05/24/23 2258    Specimen: Blood Updated: 05/24/23 2335    Narrative:      The following orders were created for panel order CBC & Differential.  Procedure                               Abnormality         Status                     ---------                               -----------         ------                     CBC Auto Differential[[id number]]        Abnormal            Final result                 Please view results for these tests on the individual orders.    CBC Auto Differential [[id number]]  (Abnormal) Collected: 05/24/23 2258    Specimen: Blood Updated: 05/24/23 2335     WBC 14.00 10*3/mm3      RBC 3.71 10*6/mm3      Hemoglobin 11.0 g/dL      Hematocrit 32.3 %      MCV 87.1 fL      MCH 29.6 pg      MCHC 34.0 g/dL      RDW 13.3 %      RDW-SD 42.9 fl      MPV 7.7 fL      Platelets 281 10*3/mm3      Neutrophil % 94.7 %      Lymphocyte % 1.8 %      Monocyte % 3.3 %      Eosinophil % 0.0 %      Basophil % 0.2 %      Neutrophils, Absolute 13.20 10*3/mm3      Lymphocytes, Absolute 0.30 10*3/mm3      Monocytes, Absolute 0.50 10*3/mm3      Eosinophils, Absolute 0.00 10*3/mm3      Basophils, Absolute 0.00 10*3/mm3      nRBC 0.0 /100 WBC     MRSA Screen, PCR (Inpatient) - Swab, Nares [[id number]]  (Normal) Collected: 05/24/23 1129    Specimen: Swab from Nares Updated: 05/24/23 1252     MRSA PCR No MRSA Detected    Narrative:      The negative predictive value of this diagnostic test is high and should only be used to consider de-escalating anti-MRSA therapy. A positive result may indicate colonization with MRSA and must be correlated clinically.    Extra Tubes [[id number]] Collected: 05/23/23 2047    Specimen: Blood, Venous Line Updated: 05/24/23 0101    Narrative:      The following orders were created for panel order Extra Tubes.  Procedure                               Abnormality         Status                     ---------                               -----------         ------                     Urcker  Top[[id number]]                                         Final result                 Please view results for these tests on the individual orders.    Gray Top [[id number]] Collected: 05/23/23 2047    Specimen: Blood Updated: 05/24/23 0101     Extra Tube Hold for add-ons.     Comment: Auto resulted.       Extra Tubes [[id number]] Collected: 05/23/23 2238    Specimen: Blood, Venous Line Updated: 05/23/23 2345    Narrative:      The following orders were created for panel order Extra Tubes.  Procedure                               Abnormality         Status                     ---------                               -----------         ------                     Gold Top - SST[[id number]]                                   Final result                 Please view results for these tests on the individual orders.    [facility] [[id number]] Collected: 05/23/23 2238    Specimen: Blood Updated: 05/23/23 2345     Extra Tube Hold for add-ons.     Comment: Auto resulted.       Comprehensive Metabolic Panel [[id number]]  (Abnormal) Collected: 05/23/23 2238    Specimen: Blood Updated: 05/23/23 2313     Glucose 105 mg/dL      BUN 15 mg/dL      Creatinine 0.90 mg/dL      Sodium 135 mmol/L      Potassium 3.6 mmol/L      Chloride 99 mmol/L      CO2 25.0 mmol/L      Calcium 8.8 mg/dL      Total Protein 5.7 g/dL      Albumin 3.1 g/dL      ALT (SGPT) 33 U/L      AST (SGOT) 33 U/L      Alkaline Phosphatase 256 U/L      Total Bilirubin 0.8 mg/dL      Globulin 2.6 gm/dL      A/G Ratio 1.2 g/dL      BUN/Creatinine Ratio 16.7     Anion Gap 11.0 mmol/L      eGFR 90.7 mL/min/1.73     Narrative:      GFR Normal >60  Chronic Kidney Disease <60  Kidney Failure <15    The GFR formula is only valid for adults with stable renal function between ages 18 and 70.    Blood Gas, Arterial - [[id number]]  (Abnormal) Collected: 05/23/23 2038    Specimen: Arterial Blood Updated: 05/23/23 2146     Site Left Radial     Sulaiman's Test Positive     pH, Arterial  7.544 pH units      pCO2, Arterial 30.6 mm Hg      pO2, Arterial 35.7 mm Hg      HCO3, Arterial 26.4 mmol/L      Base Excess, Arterial 4.3 mmol/L      Comment: Serial Number: [id number]:  [id number]        O2 Saturation, Arterial 77.1 %      Barometric Pressure for Blood Gas --     Comment: N/A        Modality Cannula     FIO2 36 %      Hemodilution No    Comprehensive Metabolic Panel [[id number]]  (Abnormal) Collected: 05/23/23 2047    Specimen: Blood Updated: 05/23/23 2126     Glucose 116 mg/dL      BUN 16 mg/dL      Creatinine 0.87 mg/dL      Sodium 136 mmol/L      Potassium 3.6 mmol/L      Chloride 98 mmol/L      CO2 25.0 mmol/L      Calcium 9.1 mg/dL      Total Protein 5.8 g/dL      Albumin 3.2 g/dL      ALT (SGPT) 34 U/L      AST (SGOT) 31 U/L      Alkaline Phosphatase 245 U/L      Total Bilirubin 0.8 mg/dL      Globulin 2.6 gm/dL      A/G Ratio 1.2 g/dL      BUN/Creatinine Ratio 18.4     Anion Gap 13.0 mmol/L      eGFR 91.7 mL/min/1.73     Narrative:      GFR Normal >60  Chronic Kidney Disease <60  Kidney Failure <15    The GFR formula is only valid for adults with stable renal function between ages 18 and 70.    Ammonia [[id number]]  (Normal) Collected: 05/23/23 2047    Specimen: Blood Updated: 05/23/23 2116     Ammonia 27 umol/L     POC Lactate [[id number]]  (Normal) Collected: 05/23/23 2046    Specimen: Blood Updated: 05/23/23 2050     Lactate 1.1 mmol/L      Comment: Serial Number: [id number]:  [id number]       POC Glucose Once [[id number]]  (Abnormal) Collected: 05/23/23 2046    Specimen: Blood Updated: 05/23/23 2050     Glucose 122 mg/dL      Comment: Serial Number: [id number]:  [id number]       POCT Electrolytes +HGB +HCT [[id number]]  (Abnormal) Collected: 05/23/23 2046    Specimen: Blood Updated: 05/23/23 2050     Sodium 135 mmol/L      POC Potassium 3.5 mmol/L      Ionized Calcium 1.24 mmol/L      Comment: Serial Number: [id number]:  [id number]        Glucose 122 mg/dL      Hematocrit 36 %      Hemoglobin  12.3 g/dL     Blood Gas, Arterial - [[id number]]  (Abnormal) Collected: 05/23/23 2046    Specimen: Arterial Blood Updated: 05/23/23 2050     Site Left Radial     Sulaiman's Test Positive     pH, Arterial 7.486 pH units      pCO2, Arterial 35.8 mm Hg      pO2, Arterial 70.2 mm Hg      HCO3, Arterial 27.0 mmol/L      Base Excess, Arterial 3.7 mmol/L      Comment: Serial Number: [id number]:  [id number]        O2 Saturation, Arterial 95.2 %      Barometric Pressure for Blood Gas --     Comment: N/A        Modality Cannula     FIO2 36 %      Hemodilution No    POC Lactate [[id number]]  (Normal) Collected: 05/23/23 2038    Specimen: Blood Updated: 05/23/23 2044     Lactate 1.6 mmol/L      Comment: Serial Number: [id number]:  [id number]       POC Glucose Once [[id number]]  (Abnormal) Collected: 05/23/23 2038    Specimen: Blood Updated: 05/23/23 2044     Glucose 111 mg/dL      Comment: Serial Number: [id number]:  [id number]       POCT Electrolytes +HGB +HCT [[id number]]  (Abnormal) Collected: 05/23/23 2038    Specimen: Blood Updated: 05/23/23 2044     Sodium 134 mmol/L      POC Potassium 3.8 mmol/L      Ionized Calcium 1.18 mmol/L      Comment: Serial Number: [id number]:  [id number]        Glucose 111 mg/dL      Hematocrit 34 %      Hemoglobin 11.7 g/dL     Protime-INR [[id number]]  (Normal) Collected: 05/23/23 0248    Specimen: Blood from Arm, Right Updated: 05/23/23 0347     Protime 11.6 Seconds      INR 1.09    Gold Top - SST [[id number]] Collected: 05/21/23 2352    Specimen: Blood Updated: 05/22/23 0100     Extra Tube Hold for add-ons.     Comment: Auto resulted.       Ammonia [[id number]]  (Normal) Collected: 05/21/23 2352    Specimen: Blood Updated: 05/22/23 0022     Ammonia 25 umol/L     Protime-INR [[id number]]  (Normal) Collected: 05/21/23 2352    Specimen: Blood Updated: 05/22/23 0014     Protime 11.1 Seconds      INR 1.04                Condition on Discharge:  Stable    Vital Signs  Temp:  [97.4 °F (36.3 °C)-98.2 °F (36.8 °C)]  97.4 °F (36.3 °C)  Heart Rate:  [] 103  Resp:  [15-24] 24  BP: (145-163)/(88-96) 156/96      Physical Exam  Vitals reviewed.   Constitutional:       Appearance: He is not ill-appearing.   HENT:      Head: Normocephalic and atraumatic.      Right Ear: External ear normal.      Left Ear: External ear normal.      Nose: Nose normal.      Mouth/Throat:      Mouth: Mucous membranes are moist.   Eyes:      General:         Right eye: No discharge.         Left eye: No discharge.   Cardiovascular:      Rate and Rhythm: Normal rate and regular rhythm.      Pulses: Normal pulses.      Heart sounds: Normal heart sounds.   Pulmonary:      Effort: Pulmonary effort is normal.      Breath sounds: Normal breath sounds.   Abdominal:      General: Bowel sounds are normal.      Palpations: Abdomen is soft.   Musculoskeletal:         General: Normal range of motion.      Cervical back: Normal range of motion.   Skin:     General: Skin is warm and dry.   Neurological:      Mental Status: He is alert and oriented to person, place, and time.   Psychiatric:         Behavior: Behavior normal.              Discharge Disposition      Discharge Medications     Discharge Medications      New Medications      Instructions Start Date   First Mouthwash (Magic Mouthwash) suspension   5 mL, Swish & Spit, Every 6 Hours      HYDROcodone-acetaminophen 7.5-325 MG per tablet  Commonly known as: NORCO   1 tablet, Oral, Every 6 Hours PRN      LORazepam 1 MG tablet  Commonly known as: Ativan   1 mg, Oral, Every 8 Hours PRN      naloxone 4 MG/0.1ML nasal spray  Commonly known as: NARCAN   Call 911. Don't prime. Friend in 1 nostril for overdose. Repeat in 2-3 minutes in other nostril if no or minimal breathing/responsiveness.         Changes to Medications      Instructions Start Date   oxyCODONE 10 MG tablet  Commonly known as: ROXICODONE  What changed:   · medication strength  · how much to take   10 mg, Oral, Every 6 Hours PRN         Continue These  Medications      Instructions Start Date   lisinopril 40 MG tablet  Commonly known as: PRINIVIL,ZESTRIL   1 tablet, Oral, Daily      metoprolol tartrate 50 MG tablet  Commonly known as: LOPRESSOR   50 mg, Oral, 2 Times Daily With Meals      mirtazapine 7.5 MG tablet  Commonly known as: REMERON   7.5 mg, Oral, Nightly             Discharge Diet:   Diet Instructions     Diet: Regular/House Diet; Regular Texture (IDDSI 7); Thin (IDDSI 0)      Discharge Diet: Regular/House Diet    Texture: Regular Texture (IDDSI 7)    Fluid Consistency: Thin (IDDSI 0)          Activity at Discharge:   Activity Instructions     Gradually Increase Activity Until at Pre-Hospitalization Level            Follow-up Appointments  Future Appointments   Date Time Provider Department Center   5/31/2023  3:30 PM LAB MD [facility]   5/31/2023  3:45 PM Jonathan Jacob MD [facility]     Additional Instructions for the Follow-ups that You Need to Schedule     Discharge Follow-up with PCP   As directed       Currently Documented PCP:    William Crawford MD    PCP Phone Number:    [phone]     Follow Up Details: call for appt 2 weeks               Test Results Pending at Discharge  Pending Labs     Order Current Status    Tissue Pathology Exam In process    Tissue Pathology Exam In process    Blood Culture - Blood, Hand, Left Preliminary result           RALEIGH Ellis  05/26/23  11:12 EDT    Time: Discharge 25 min

## 2023-05-26 NOTE — PLAN OF CARE
Goal Outcome Evaluation:      Pt has been resting through the night, alternating pain medications between dilaudid and morphine. Pt family has remained at bedside and attentive to patient and his needs. Plan for patient per family to go home with hospice today if they are able to do such. VSS at this time with no further issues noted, call light has remained within reach.

## 2023-05-26 NOTE — PROGRESS NOTES
Hematology/Oncology Inpatient Progress Note    PATIENT NAME: Cali Cavazos  : 1950  MRN: [id number]    CHIEF COMPLAINT: Metastatic malignancy.     HISTORY OF PRESENT ILLNESS:      5/15/2023: Mr. Cavazos was referred for the investigation and treatment of what appeared to be a metastatic malignancy. His symptoms had started at the end of  or the beginning of . He first noted a loss of appetite. More important, however, he had dysgeusia. This led slowly to a significant reduction in the amount he ate and consequently he had a drop in his weight. Slowly he developed low back pain that became progressively more intense. His dysgeusia became more severe. He had imaging of the abdomen that reported findings consistent with metastatic disease involving lymph nodes in the ben hepatis, mid abdominal mesentery and the mid abdominal retroperitoneum. Some suggestion of peritoneal carcinomatosis was also present. But more important, there were several nodules in the liver, the largest of which was about 1.8 cm. No obvious source was present, though a non-specific lesion measuring 1.5 cm was present in the mid left kidney. Also a change in caliber of the colon near the level of the rectum. The exam revealed him to be a slender man in no distress. No jaundice and not ill or in distress. No jaundice. No oral lesions and no palpable adenopathy. Lungs diminished. The liver appeared enlarged to palpation of the abdomen; at the the level of the mid clavicular line it seemed to be approximately 5 cm below the costal margin and it was hard and nodular. Family history was not contributory to the present illness. A decision was made to obtain biopsy of the liver.      2023: Mr. Cavazos came to the emergency department complaining of progressive loss of his sense of well being. Profound anorexia and inability to eat anything were other prominent symptoms. As well, he was having abdominal pain. He was  admitted for further investigations.     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 A colonoscopy could not be done. He had an ileostomy created.     Subjective   5/26/2023: Progressively better. Taking liquids without any problem. To be discharged today.     ROS:  Review of Systems   Constitutional: Positive for activity change, appetite change, fatigue and unexpected weight change. Negative for chills, diaphoresis and fever.   HENT: Negative for congestion, dental problem, drooling, ear discharge, ear pain, facial swelling, hearing loss, mouth sores, nosebleeds, postnasal drip, rhinorrhea, sinus pressure, sinus pain, sneezing, sore throat, tinnitus, trouble swallowing and voice change.    Eyes: Negative for photophobia, pain, discharge, redness, itching and visual disturbance.   Respiratory: Negative for apnea, cough, choking, chest tightness, shortness of breath, wheezing and stridor.    Cardiovascular: Negative for chest pain, palpitations and leg swelling.   Gastrointestinal: Positive for abdominal pain. Negative for abdominal distention, anal bleeding, blood in stool, constipation, diarrhea, nausea, rectal pain and vomiting.   Endocrine: Negative for cold intolerance, heat intolerance, polydipsia and polyuria.   Genitourinary: Negative for decreased urine volume, difficulty urinating, dysuria, flank pain, frequency, genital sores, hematuria and urgency.   Musculoskeletal: Negative for arthralgias, back pain, gait problem, joint swelling, myalgias, neck pain and neck stiffness.   Skin: Negative for color change, pallor and rash.   Neurological: Negative for dizziness, tremors, seizures, syncope, facial asymmetry, speech difficulty, weakness, light-headedness, numbness and headaches.   Hematological: Negative for adenopathy. Does not bruise/bleed easily.   Psychiatric/Behavioral: Negative for agitation, behavioral problems,  "confusion, decreased concentration, hallucinations, self-injury, sleep disturbance and suicidal ideas. The patient is not nervous/anxious.         MEDICATIONS:    Scheduled Meds:  First Mouthwash (Magic Mouthwash), 5 mL, Swish & Spit, Q6H  lisinopril, 40 mg, Oral, Daily  melatonin, 5 mg, Oral, Nightly  metoprolol tartrate, 50 mg, Oral, BID With Meals  mirtazapine, 7.5 mg, Oral, Nightly  polyethylene glycol, 17 g, Oral, Daily  sodium chloride, 10 mL, Intravenous, Q12H  sorbitol, 50 mL, Oral, Once       Continuous Infusions:  phenylephrine, 0.5-3 mcg/kg/min       PRN Meds:  •  acetaminophen  •  [DISCONTINUED] senna-docusate sodium **AND** [DISCONTINUED] polyethylene glycol **AND** [DISCONTINUED] bisacodyl **AND** bisacodyl  •  HYDROcodone-acetaminophen  •  loperamide  •  Magnesium Standard Dose Replacement - Follow Nurse / BPA Driven Protocol  •  Morphine  •  nitroglycerin  •  ondansetron **OR** ondansetron  •  oxyCODONE  •  phenylephrine  •  [COMPLETED] Insert Peripheral IV **AND** sodium chloride  •  sodium chloride     ALLERGIES:    Allergies   Allergen Reactions   • Penicillins Hives     Tolerated cefepime for 5/21/23 admission     Objective    VITALS:   /96   Pulse 103   Temp 97.4 °F (36.3 °C) (Oral)   Resp 24   Ht 182.9 cm (72\")   Wt 72.8 kg (160 lb 7.9 oz)   SpO2 97%   BMI 21.77 kg/m²     PHYSICAL EXAM: (performed by MD)  Physical Exam  Constitutional:       General: He is not in acute distress.     Appearance: He is ill-appearing. He is not toxic-appearing or diaphoretic.   HENT:      Head: Normocephalic and atraumatic.      Right Ear: External ear normal.      Left Ear: External ear normal.      Nose: Nose normal.      Mouth/Throat:      Mouth: Mucous membranes are moist.      Pharynx: Oropharynx is clear.   Eyes:      General: No scleral icterus.        Right eye: No discharge.         Left eye: No discharge.      Conjunctiva/sclera: Conjunctivae normal.      Pupils: Pupils are equal, round, and " reactive to light.   Cardiovascular:      Rate and Rhythm: Normal rate and regular rhythm.      Pulses: Normal pulses.      Heart sounds: Normal heart sounds. No murmur heard.    No friction rub. No gallop.   Pulmonary:      Effort: No respiratory distress.      Breath sounds: No stridor. No wheezing, rhonchi or rales.   Chest:      Chest wall: No tenderness.   Abdominal:      General: Abdomen is flat. Bowel sounds are normal. There is no distension.      Palpations: Abdomen is soft. There is no mass.      Tenderness: There is no abdominal tenderness. There is no right CVA tenderness, left CVA tenderness, guarding or rebound.   Musculoskeletal:         General: No swelling, tenderness, deformity or signs of injury.      Cervical back: No rigidity.      Right lower leg: No edema.      Left lower leg: No edema.   Lymphadenopathy:      Cervical: No cervical adenopathy.   Skin:     General: Skin is warm and dry.      Coloration: Skin is not jaundiced.      Findings: No bruising or rash.   Neurological:      General: No focal deficit present.      Mental Status: He is alert and oriented to person, place, and time.      Gait: Gait normal.   Psychiatric:         Mood and Affect: Mood normal.         Behavior: Behavior normal.         Thought Content: Thought content normal.         Judgment: Judgment normal.     ARELIS Jacob MD performed the physical exam on 5/26/2023 as documented above.     RECENT LABS:  Lab Results (last 24 hours)     Procedure Component Value Units Date/Time    Magnesium [[id number]]  (Normal) Collected: 05/26/23 0017    Specimen: Blood Updated: 05/26/23 0116     Magnesium 1.7 mg/dL     Basic Metabolic Panel [[id number]]  (Abnormal) Collected: 05/26/23 0017    Specimen: Blood Updated: 05/26/23 0107     Glucose 136 mg/dL      BUN 19 mg/dL      Creatinine 0.69 mg/dL      Sodium 134 mmol/L      Potassium 3.8 mmol/L      Chloride 100 mmol/L      CO2 24.0 mmol/L      Calcium 9.4 mg/dL      BUN/Creatinine  Ratio 27.5     Anion Gap 10.0 mmol/L      eGFR 98.3 mL/min/1.73     Narrative:      GFR Normal >60  Chronic Kidney Disease <60  Kidney Failure <15    The GFR formula is only valid for adults with stable renal function between ages 18 and 70.    Phosphorus [[id number]]  (Normal) Collected: 05/26/23 0017    Specimen: Blood Updated: 05/26/23 0107     Phosphorus 2.8 mg/dL     CBC & Differential [[id number]]  (Abnormal) Collected: 05/26/23 0017    Specimen: Blood Updated: 05/26/23 0056    Narrative:      The following orders were created for panel order CBC & Differential.  Procedure                               Abnormality         Status                     ---------                               -----------         ------                     CBC Auto Differential[[id number]]        Abnormal            Final result                 Please view results for these tests on the individual orders.    CBC Auto Differential [[id number]]  (Abnormal) Collected: 05/26/23 0017    Specimen: Blood Updated: 05/26/23 0056     WBC 11.70 10*3/mm3      RBC 3.71 10*6/mm3      Hemoglobin 10.6 g/dL      Hematocrit 32.3 %      MCV 87.0 fL      MCH 28.6 pg      MCHC 32.9 g/dL      RDW 13.2 %      RDW-SD 40.3 fl      MPV 7.7 fL      Platelets 308 10*3/mm3      Neutrophil % 84.6 %      Lymphocyte % 4.9 %      Monocyte % 9.7 %      Eosinophil % 0.5 %      Basophil % 0.3 %      Neutrophils, Absolute 9.90 10*3/mm3      Lymphocytes, Absolute 0.60 10*3/mm3      Monocytes, Absolute 1.10 10*3/mm3      Eosinophils, Absolute 0.10 10*3/mm3      Basophils, Absolute 0.00 10*3/mm3      nRBC 0.1 /100 WBC     Blood Culture - Blood, Hand, Left [[id number]]  (Normal) Collected: 05/23/23 2238    Specimen: Blood from Hand, Left Updated: 05/25/23 2300     Blood Culture No growth at 2 days    Narrative:      Less than seven (7) mL's of blood was collected.  Insufficient quantity may yield false negative results.    Tissue Pathology Exam [[id number]] Collected: 05/23/23  "1600    Specimen: Tissue from Large Intestine, Rectum Updated: 05/25/23 1600     Case Report --     Surgical Pathology Report                         Case: [id number]                                  Authorizing Provider:  Shiraz Adamson MD Collected:           05/23/2023 04:00 PM          Ordering Location:     [facility]  Received:            05/24/2023 10:55 AM                                 SUITES                                                                       Pathologist:           Rishi Heath MD                                                             Specimen:    Large Intestine, Rectum, rectal mass                                                        Final Diagnosis --     Rectal mass, biopsy:    Reactive chronically inflamed colonic mucosa with focal hyperplastic change, see comment     No evidence of malignancy    JPR/sms        Comment --     Multiple deeper levels have been examined and no dysplasia or malignancy is identified in this sample. Clinical and endoscopic correlation is recommended.     JPR/sms        Gross Description --     1. Large Intestine, Rectum.  Received in formalin designated \"Rectal mass\" are several fragments of whitish tissue measuring 0.2 cm in greatest dimension, submitted in one cassette.     AFSANEH/tkd             IMAGING REVIEWED:  No radiology results for the last day    Assessment & Plan   ASSESSMENT:  1. Metastatic malignancy. Discussed with Dr. Tay in Pathology. Discussed with him on the phone as he had left the hospital before the information was available. Awaiting the final report and will see in the office.     PLAN:  1. As above.     Jonathan Jacob MD on 5/26/2023 at 16:41    "

## 2023-05-26 NOTE — TELEPHONE ENCOUNTER
Caller: MacyMae    Relationship: Emergency Contact    Best call back number: [phone]    What is the best time to reach you: ASAP    Who are you requesting to speak with (clinical staff, provider,  specific staff member): CLINICAL - OSCAR    What was the call regarding: PT WILL BE DISCHARGED THIS AFTERNOON. DR. MOORE IS SUPPOSED TO CALL WITH BIOPSY RESULTS. PT WOULD LIKE DR. MOORE TO KNOW THAT HE HAS BEEN DISCHARGED AND TO PLEASE CALL THE HOME PHONE WITH THE BIOPSY RESULTS.    Do you require a callback: YES

## 2023-05-26 NOTE — DISCHARGE PLACEMENT REQUEST
"Adryan Yusuf (72 y.o. Male)     Date of Birth   1950    Social Security Number       Address   [address] [place] [address] [address] [place] [address]    Home Phone   [phone]    MRN   [id number]       Sabianism   Mormon    Marital Status                               Admission Date   5/21/23    Admission Type   Emergency    Admitting Provider   Rajwinder Inman MD    Attending Provider   William Crawford MD    Department, Room/Bed   [facility], 2123/1       Discharge Date       Discharge Disposition       Discharge Destination                               Attending Provider: William Crawford MD    Allergies: Penicillins    Isolation: None   Infection: None   Code Status: CPR    Ht: 182.9 cm (72\")   Wt: 72.8 kg (160 lb 7.9 oz)    Admission Cmt: None   Principal Problem: Peritoneal carcinomatosis [C78.6]                 Active Insurance as of 5/21/2023     Primary Coverage     Payor Plan Insurance Group Employer/Plan Group    MEDICARE MEDICARE A & B      Payor Plan Address Payor Plan Phone Number Payor Plan Fax Number Effective Dates    [address] [phone]  6/1/2015 - None Entered    [facility] [id number]       Subscriber Name Subscriber Birth Date Member ID       ADRYAN YUSUF 1950 [id number]           Secondary Coverage     Payor Plan Insurance Group Employer/Plan Group    [employer]P AAR HE[employer] CARE OPTIONS      Payor Plan Address Payor Plan Phone Number Payor Plan Fax Number Effective Dates    [facility] [phone]  1/1/2021 - None Entered    [address]       [facility] [address]       Subscriber Name Subscriber Birth Date Member ID       ADRYAN YUSUF 1950 [id number]                 Emergency Contacts      (Rel.) Home Phone Work Phone Mobile Phone    Mae Yusuf (Spouse) [phone] -- --    juana brown (Daughter) -- -- [phone]    dallas narayan (Daughter) -- -- [phone]              "

## 2023-05-26 NOTE — PROGRESS NOTES
General Surgery Progress Note    Name: Cali Cavazos ADMIT: 2023   : 1950  PCP: William Crawford MD    MRN: [id number] LOS: 3 days   AGE/SEX: 72 y.o. male  ROOM:    [facility]    Chief Complaint   Patient presents with   • Constipation     Pt reports constipation for the past 3 days.  Pt reports anorexia, states total loss of appetite and states that the taste of food has changed.  Pt reports metastatic cancer to the liver and is not currently doing radiation or chemo.      Subjective     72 y.o. male admitted with constipation loss of appetite found to have carcinomatosis status post exploratory laparotomy with loop ileostomy on May 24    In general feeling pretty good kind of worn out from his visitors yesterday but his incisional pain is okay.  Tolerating some full liquid diet without nausea or vomiting.        Objective     Scheduled Medications:   First Mouthwash (Magic Mouthwash), 5 mL, Swish & Spit, Q6H  heparin (porcine), 5,000 Units, Subcutaneous, Q8H  lisinopril, 40 mg, Oral, Daily  melatonin, 5 mg, Oral, Nightly  metoprolol tartrate, 50 mg, Oral, BID With Meals  mirtazapine, 7.5 mg, Oral, Nightly  polyethylene glycol, 17 g, Oral, Daily  risperiDONE, 1 mg, Oral, Nightly  sodium chloride, 10 mL, Intravenous, Q12H  sorbitol, 50 mL, Oral, Once        Active Infusions:  niCARdipine, 5-15 mg/hr  phenylephrine, 0.5-3 mcg/kg/min  sodium chloride, 75 mL/hr, Last Rate: 75 mL/hr (23 0252)        As Needed Medications:  •  acetaminophen  •  [DISCONTINUED] senna-docusate sodium **AND** [DISCONTINUED] polyethylene glycol **AND** [DISCONTINUED] bisacodyl **AND** bisacodyl  •  HYDROmorphone  •  loperamide  •  LORazepam  •  Magnesium Standard Dose Replacement - Follow Nurse / BPA Driven Protocol  •  Morphine  •  niCARdipine  •  nitroglycerin  •  ondansetron **OR** ondansetron  •  oxyCODONE  •  phenylephrine  •  [COMPLETED] Insert Peripheral IV **AND** sodium chloride  •  sodium chloride    Vital  Signs  Vital Signs   Patient Vitals for the past 24 hrs:   BP Temp Temp src Pulse Resp SpO2 Weight   05/26/23 0540 146/91 97.7 °F (36.5 °C) Oral 102 20 93 % 72.8 kg (160 lb 7.9 oz)   05/26/23 0017 145/88 98.1 °F (36.7 °C) Oral 114 20 93 % --   05/25/23 2051 150/89 97.9 °F (36.6 °C) Oral 98 21 93 % --   05/25/23 1700 149/89 97.8 °F (36.6 °C) Oral 89 15 95 % --   05/25/23 1300 163/91 98.2 °F (36.8 °C) Oral 87 18 97 % --   05/25/23 0957 -- 98.1 °F (36.7 °C) Oral -- 18 98 % --       Physical Exam:  Physical Exam  Constitutional:       General: He is not in acute distress.     Appearance: He is not ill-appearing.   HENT:      Head: Normocephalic and atraumatic.   Pulmonary:      Effort: Pulmonary effort is normal. No respiratory distress.   Abdominal:      Comments: Appropriate tender to palpation no significant drainage abdomen is soft appropriately tender to palpation moderate distention the ileostomy is red but still appears moist and viable.  There is some production of fluid  Incision line is clean dry intact   Neurological:      Mental Status: He is alert.         Results Review:     CBC    Results from last 7 days   Lab Units 05/26/23  0017 05/24/23  2258 05/23/23  2238 05/23/23  2047 05/23/23  2046 05/23/23  2038 05/23/23  0248 05/22/23  0447 05/21/23  2352   WBC 10*3/mm3 11.70* 14.00* 11.20* 8.70  --   --  7.80 9.00 8.10   HEMOGLOBIN g/dL 10.6* 11.0* 11.0* 11.6*  --   --  11.7* 11.9* 12.1*   HEMOGLOBIN, POC g/dL  --   --   --   --  12.3 11.7*  --   --   --    PLATELETS 10*3/mm3 308 281 296 310  --   --  330 342 326     BMP   Results from last 7 days   Lab Units 05/26/23  0017 05/24/23 2258 05/23/23 2238 05/23/23  2047 05/23/23  0248 05/22/23  0447 05/21/23  2352   SODIUM mmol/L 134* 135* 135* 136 133* 131* 129*   POTASSIUM mmol/L 3.8 4.0 3.6 3.6 4.7 4.8 4.9   CHLORIDE mmol/L 100 101 99 98 95* 95* 95*   CO2 mmol/L 24.0 22.0 25.0 25.0 24.0 27.0 24.0   BUN mg/dL 19 18 15 16 13 19 19   CREATININE mg/dL 0.69* 0.80  0.90 0.87 0.90 1.02 0.99   GLUCOSE mg/dL 136* 184* 105* 116* 103* 117* 101*   MAGNESIUM mg/dL 1.7 2.3 1.5*  --  1.9 1.9  --    PHOSPHORUS mg/dL 2.8 2.9 2.1*  --  2.9 3.1  --        I reviewed the patient's new clinical results.    Assessment & Plan       Peritoneal carcinomatosis    Abdominal pain    Hyponatremia    Abnormal CT of the abdomen    Severe Malnutrition (HCC)    Colon obstruction      72 y.o. male status post loop ileostomy for carcinomatosis on May 24    Regular diet as tolerated    Pathology still pending.  I have counseled him about the risk and benefits of Nxbamc-g-Vtef placement if that is something that is going be offered in the future.  Talked about steps of the operation anticipated.  Possible complications.  After his spinal plan with oncology he is needs to reach out to the office and I can set this up as an outpatient  Okay for DVT chemoprophylaxis    Also okay to remove the bridge with his ileostomy stoma appliance changed today        This note was created using Dragon Voice Recognition software.    Floyd Ambriz MD  05/26/23  08:25 EDT

## 2023-05-26 NOTE — OUTREACH NOTE
Prep Survey    Flowsheet Row Responses   [facility] facility patient discharged from? [facility]   Is LACE score < 7 ? No   Eligibility Not Eligible   What are the reasons patient is not eligible? Hospice/Pallative Care  [[facility]]   Does the patient have one of the following disease processes/diagnoses(primary or secondary)? Other   Prep survey completed? Yes          Shanika MOBLEY - Registered Nurse

## 2023-05-26 NOTE — THERAPY RE-EVALUATION
Acute Care - Speech Language Pathology   Swallow Re-Evaluation [facility]     Patient Name: Cali Cavazos  : 1950  MRN: [id number]  Today's Date: 2023               Admit Date: 2023    Visit Dx:     ICD-10-CM ICD-9-CM   1. Peritoneal carcinomatosis  C78.6 197.6   2. Abdominal pain, unspecified abdominal location  R10.9 789.00   3. Hyponatremia  E87.1 276.1   4. Abnormal CT of the abdomen  R93.5 793.6   5. Abdominal distension  R14.0 787.3   6. Prostate cancer  C61 185   7. Liver lesion  K76.9 573.8   8. Metastatic malignant neoplasm, unspecified site  C79.9 199.1   9. Colon obstruction  K56.609 560.9     Patient Active Problem List   Diagnosis   • Peritoneal carcinomatosis   • Abdominal pain   • Hyponatremia   • Abnormal CT of the abdomen   • Severe Malnutrition (HCC)   • Colon obstruction     Past Medical History:   Diagnosis Date   • Hypertension    • Prostate cancer      Past Surgical History:   Procedure Laterality Date   • BACK SURGERY      two back surgeries    • COLONOSCOPY     • COLONOSCOPY N/A 2023    Procedure: COLONOSCOPY with rectal biopsy's;  Surgeon: Shiraz Adamson MD;  Location: [facility] ENDOSCOPY;  Service: Gastroenterology;  Laterality: N/A;  high grade distal colonic stricture   • ENDOSCOPY N/A 2023    Procedure: ESOPHAGOGASTRODUODENOSCOPY;  Surgeon: Shiraz Adamson MD;  Location: [facility] ENDOSCOPY;  Service: Gastroenterology;  Laterality: N/A;  GERD       SLP Recommendation and Plan     EDUCATION  The patient has been educated in the following areas:   Dysphagia (Swallowing Impairment) Oral Care/Hydration.        SLP GOALS     Row Name 23 1000          Diet Texture (Demonstrate functional swallow) regular textures  -CB    Liquid viscosity (Demonstrate functional swallow) thin liquids  -CB    [place] (Demonstrate functional swallow) independently (over 90% accuracy)  -CB    Time Frame (Demonstrate functional swallow) by discharge  -CB     Progress/Outcomes (Demonstrate functional swallow) goal met  -CB          (LTG) Swallow Pt will return to baseline level of diet w/ no observable or reported issues.  -CB    Time Frame (Swallow Long Term Goal) 1 week  -CB    Progress/Outcomes (Swallow Long Term Goal) goal met  -CB    Comment (Swallow Long Term Goal) Patient was seen for re-evaluation of swallow function. Patient was sitting upright in 90 degree hip flexion. Patient has his own natural teeth. Oral mechanism examination revealed patient demonstrated full ROM and mobility of lingual and labial structures. Patient was provided sips of thins via straw x 3 with no overt s/s of aspiration and/or complications. Swallow was timely. Patient was provided trials of regular x 3. Patient exhibited adequate rotary chewing. Patient exhibited adequate bolus manipulation.  Patient cleared oral cavity effectively between bites as no oral residue was evident. No wet vocal quality was detected. Patient was placed on regular diet per MD this morning as he was cleared for solids. Patient is tolerating solids without difficutlty. Patient only c/o decrease appetite. It is recommended that patient remain on regular diet at this time. ST will s/o as patient is managing least restrictive diet.  -CB          User Key  (r) = Recorded By, (t) = Taken By, (c) = Cosigned By    Initials Name Provider Type    Giulia Morris, SLP Speech and Language Pathologist                   Time Calculation:                TOBY Celestin  5/26/2023

## 2023-05-26 NOTE — ANESTHESIA POSTPROCEDURE EVALUATION
Patient: Cali Cavazos    Procedure Summary     Date: 05/24/23 Room / Location: [facility] OR 09 / [facility] MAIN OR    Anesthesia Start: 1611 Anesthesia Stop: 1747    Procedure: LOOP COLOSTOMY, PERITONEAL BIOPSY (Abdomen) Diagnosis:       Colon obstruction      (Colon obstruction [K56.609])    Surgeons: Floyd Ambriz MD Provider: Jodi Lopez CRNA    Anesthesia Type: general ASA Status: 4          Anesthesia Type: general    Vitals  Vitals Value Taken Time   /66 05/24/23 1837   Temp 97 °F (36.1 °C) 05/24/23 1837   Pulse 87 05/24/23 1838   Resp 20 05/24/23 1837   SpO2 96 % 05/24/23 1838   Vitals shown include unvalidated device data.        Post Anesthesia Care and Evaluation    Patient location during evaluation: PACU  Patient participation: complete - patient participated  Level of consciousness: awake  Pain scale: See nurse's notes for pain score.  Pain management: adequate    Airway patency: patent  Anesthetic complications: No anesthetic complications  PONV Status: none  Cardiovascular status: acceptable  Respiratory status: acceptable and spontaneous ventilation  Hydration status: acceptable    Comments: Patient seen and examined postoperatively; vital signs stable; SpO2 greater than or equal to 90%; cardiopulmonary status stable; nausea/vomiting adequately controlled; pain adequately controlled; no apparent anesthesia complications; patient discharged from anesthesia care when discharge criteria were met

## 2023-05-26 NOTE — TELEPHONE ENCOUNTER
SPOKE WITH DR. MOORE, HE STATED HE HAS SPOKE WITH DR. BAUM IN PATHOLOGY REGARDING PRELIMINARY RESULTS FROM BIOPSY. DR. MOORE STATED HE WILL CONTACT THE PATIENT REGARDING THE PRELIMINARY RESULTS.

## 2023-05-26 NOTE — CASE MANAGEMENT/SOCIAL WORK
Discharge Planning Assessment  [facility]     Patient Name: Cali Cavazos  MRN: [id number]  Today's Date: 5/26/2023    Admit Date: 5/21/2023    Plan: Anticipate home with spouse and [facility].         Discharge Plan     Row Name 05/26/23 1202       Plan    Plan Anticipate home with spouse and [facility].    Patient/Family in Agreement with Plan yes    Plan Comments CM met with patient, spouse and daughters at bedside. Patient and family would like referral to [facility] and would like to d/c home today. IMM letter reviewed with patient and family, verbal consent and copy left at bedside. CM notified RN/NP of [facility] referral. CM notified [facility] liaison who met with patient, DME ordered for home and family will transport home today. D/C orders noted.                   Expected Discharge Date and Time     Expected Discharge Date Expected Discharge Time    May 26, 2023              Patient Forms     Row Name 05/26/23 1205       Patient Forms    Important Message from Medicare ([facility]) Delivered  5/26/23    Delivered to Support person    Method of delivery In person  reviewed with patient and spouse/daughters, verbal consent and copy left at bedside              Case Management Discharge Note      Final Note: Home with [facility]    Provided Post Acute Provider List?: N/A  N/A Provider List Comment: anticipate home    Selected Continued Care - Admitted Since 5/21/2023         Home Medical Care Coordination complete.    Service Provider Selected Services Address Phone Fax Patient Preferred    [facility] [address] [place] [address] [phone] -- --               Transportation Services  Private: Car    Final Discharge Disposition Code: 50 - home with hospice    Met with patient and family in room. Maintained distance greater than six feet and spent less than 15 minutes in the room.      MERLENE MaddoxN, RN    [facility]  [place]aitanya  [address] [place] [id number]    Office: [phone]  Fax: [phone]

## 2023-05-26 NOTE — NURSING NOTE
72-year-old male admitted to the hospital on May 21 with complaints of constipation and abdominal distention.  Noted with peritoneal carcinomatosis.  He underwent an exploratory laparotomy with lysis of adhesions, omental biopsy and creation of a loop ileostomy on May 24.  Patient is now postop day 2.  Patient is awake and alert has started a regular diet and tolerating well.    Comprehensive education is performed with the patient and his family.  Written materials and supplies are left in the room.  The right lower quadrant ileostomy the stoma is red and moist mostly serosanguineous exudate with some small stool noted in the pouch.  Patient is producing gas.  The pouch was removed peristomal skin is intact.  There is a [employer] T-bar juliana in place.  I cleansed and dried the skin.  I applied Skin-Prep.  I applied an adapt ring under the juliana and I also pouched under the juliana using a Martin cut to fit 1 piece system.  I demonstrated the pouch change to the patient and as well as his family.  The daughter is a nurse and is comfortable with ostomy care.  Patient also to have hospice as it is likely he is going home today.  I instructed him and family on how to empty the appliance how to clean the end of the pouch when to empty.  I instructed him on bathing, hygiene, clothing as well.  As hydration and nutrition and how to thicken stools.  They verbalized understanding of information.  All questions were answered.  I also discussed that if needed.  Patient can return to the ostomy clinic if needed.  Business card are in his ostomy folder.

## 2023-05-28 LAB — BACTERIA SPEC AEROBE CULT: NORMAL

## 2023-05-30 LAB
LAB AP CASE REPORT: NORMAL
LAB AP CASE REPORT: NORMAL
LAB AP DIAGNOSIS COMMENT: NORMAL
LAB AP DIAGNOSIS COMMENT: NORMAL
LAB AP FLOW CYTOMETRY SUMMARY: NORMAL
Lab: NORMAL
PATH REPORT.FINAL DX SPEC: NORMAL
PATH REPORT.FINAL DX SPEC: NORMAL
PATH REPORT.GROSS SPEC: NORMAL
PATH REPORT.GROSS SPEC: NORMAL

## 2023-05-30 NOTE — PROGRESS NOTES
HEMATOLOGY ONCOLOGY OUTPATIENT FOLLOW-UP       Patient name: Cali Cavazos  : 1950  MRN: [id number]  Primary Care Physician: William Crawford MD  Referring Physician: No ref. provider found  Reason For Consult: Metastatic malignancy. History of prostate cancer.     History of Present Illness:  Patient is a 72 y.o.     5/15/2023: Mr. Cavazos was referred for the investigation and treatment of what appeared to be a metastatic malignancy. His symptoms had started at the end of  or the beginning of . He first noted a loss of appetite. More important, however, he had dysgeusia. This led slowly to a significant reduction in the amount he ate and consequently he had a drop in his weight. Slowly he developed low back pain that became progressively more intense. His dysgeusia became more severe. He had imaging of the abdomen that reported findings consistent with metastatic disease involving lymph nodes in the ben hepatis, mid abdominal mesentery and the mid abdominal retroperitoneum. Some suggestion of peritoneal carcinomatosis was also present. But more important, there were several nodules in the liver, the largest of which was about 1.8 cm. No obvious source was present, though a non-specific lesion measuring 1.5 cm was present in the mid left kidney. Also a change in caliber of the colon near the level of the rectum. The exam revealed him to be a slender man in no distress. No jaundice and not ill or in distress. No jaundice. No oral lesions and no palpable adenopathy. Lungs diminished. The liver appeared enlarged to palpation of the abdomen; at the the level of the mid clavicular line it seemed to be approximately 5 cm below the costal margin and it was hard and nodular. Family history was not contributory to the present illness. A decision was made to obtain biopsy of the liver. Several laboratory exams were requested. To see me in 2 weeks.     2023: Mr. Cavazos  came to the emergency department complaining of progressive loss of his sense of well being. Profound anorexia and inability to eat anything were other prominent symptoms. As well, he was having abdominal pain. He was admitted for further investigations.     5/24/2023: Underwent exploratory laparotomy. He was found to have extensive peritoneal carcinomatosis. The sigmoid was tightly adhered to the peritoneal surfaces and could not be mobilized for a colostomy. For this reason he had an ileostomy created. He had recovery of bowel function and was discharged on 5/27/2023.     5/31/2023: Feeling better. Weak and not able to eat much. No nausea. Pain is under control and has been taking less analgesic. No dyspnea or chest pain. No dysuria. No edema. On exam appears ill. No distress. No jaundice. No oral lesions. The abdomen is flat and soft. Ileostomy in place; bowel sounds present. No edema. The final report of pathology was a moderately to poorly differentiated adenocarcinoma. Tumor cells were positive for CDX-2, cytokeratin AE1/3 and CA 19.9 and was focally positive for CK20. The tumor was negative for CK7, CD 56, TTF-1, napsin A, CK5/6, chromogranin, and synaptophysin. This immunohistochemical pattern was non-specific but upper digestive tract, including pancreaticobiliary origin was suggested. Next generation sequencing and PD-L1 expression was requested. The rationale of this was explained to them. Insertion of a port was requested and I asked him to see me in 2 weeks.     Subjective:  5/31/2023: Feeling poorly. Weak and anorectic but without much pain. Not much nausea. No dyspnea or cough. The ileostomy has been functioning properly. No dysuria. No edema. No skin rash.     Past Medical History:   Diagnosis Date   • Hypertension    • Prostate cancer      Past Surgical History:   Procedure Laterality Date   • BACK SURGERY      two back surgeries 1996   • COLONOSCOPY  2008   • COLONOSCOPY N/A 5/23/2023    Procedure:  COLONOSCOPY with rectal biopsy's;  Surgeon: Shiraz Adamson MD;  Location: [facility] ENDOSCOPY;  Service: Gastroenterology;  Laterality: N/A;  high grade distal colonic stricture   • COLOSTOMY N/A 5/24/2023    Procedure: LOOP COLOSTOMY, PERITONEAL BIOPSY;  Surgeon: Floyd Ambriz MD;  Location: [facility] MAIN OR;  Service: General;  Laterality: N/A;   • ENDOSCOPY N/A 5/23/2023    Procedure: ESOPHAGOGASTRODUODENOSCOPY;  Surgeon: Shiraz Adamson MD;  Location: [facility] ENDOSCOPY;  Service: Gastroenterology;  Laterality: N/A;  GERD       Current Outpatient Medications:   •  DPH-Lido-AlHydr-MgHydr-Simeth (First Mouthwash, Magic Mouthwash,) suspension, Swish and spit 5 mL Every 6 (Six) Hours., Disp: 360 mL, Rfl: 1  •  HYDROcodone-acetaminophen (NORCO) 7.5-325 MG per tablet, Take 1 tablet by mouth Every 6 (Six) Hours As Needed for Moderate Pain for up to 30 days., Disp: 120 tablet, Rfl: 0  •  lisinopril (PRINIVIL,ZESTRIL) 40 MG tablet, Take 1 tablet by mouth Daily., Disp: , Rfl:   •  LORazepam (Ativan) 1 MG tablet, Take 1 tablet by mouth Every 8 (Eight) Hours As Needed for Anxiety., Disp: 10 tablet, Rfl: 0  •  metoprolol tartrate (LOPRESSOR) 50 MG tablet, Take 1 tablet by mouth 2 (Two) Times a Day With Meals., Disp: , Rfl:   •  mirtazapine (REMERON) 7.5 MG tablet, Take 1 tablet by mouth Every Night., Disp: , Rfl:   •  naloxone (NARCAN) 4 MG/0.1ML nasal spray, Call 911. Don't prime. [place] in 1 nostril for overdose. Repeat in 2-3 minutes in other nostril if no or minimal breathing/responsiveness., Disp: 2 each, Rfl: 0  •  oxyCODONE (ROXICODONE) 10 MG tablet, Take 1 tablet by mouth Every 6 (Six) Hours As Needed for Moderate Pain for up to 30 days., Disp: 120 each, Rfl: 0  •  dexamethasone (DECADRON) 4 MG tablet, Take 1 tablet by mouth 2 (Two) Times a Day With Meals. Take one dose at around 8 am and one dose at about 4 pm for two weeks. Then reduce to 8 mg in the morning only., Disp: 60 tablet, Rfl: 1    Allergies    Allergen Reactions   • Penicillins Hives     Tolerated cefepime for 23 admission     Family History   Problem Relation Age of Onset   • Stroke Mother    • Stomach cancer Father 74   • Throat cancer Sister    • Cancer Brother         Unclear origin. Bladder?   • Prostate cancer Brother 65     Cancer-related family history includes Cancer in his brother; Prostate cancer (age of onset: 65) in his brother; Stomach cancer (age of onset: 74) in his father; Throat cancer in his sister.    Social History     Tobacco Use   • Smoking status: Former     Packs/day: 1.00     Years: 50.00     Pack years: 50.00     Types: Cigarettes     Start date:      Quit date: 2022     Years since quittin.0   • Smokeless tobacco: Never   Vaping Use   • Vaping Use: Never used   Substance Use Topics   • Alcohol use: Not Currently   • Drug use: Never     Social History     Social History Narrative   • Not on file     ROS:   Review of Systems   Constitutional: Positive for activity change, appetite change, fatigue and unexpected weight change. Negative for chills, diaphoresis and fever.   HENT: Negative for congestion, dental problem, drooling, ear discharge, ear pain, facial swelling, hearing loss, mouth sores, nosebleeds, postnasal drip, rhinorrhea, sinus pressure, sinus pain, sneezing, sore throat, tinnitus, trouble swallowing and voice change.    Eyes: Negative for photophobia, pain, discharge, redness, itching and visual disturbance.   Respiratory: Negative for apnea, cough, choking, chest tightness, shortness of breath, wheezing and stridor.    Cardiovascular: Negative for chest pain, palpitations and leg swelling.   Gastrointestinal: Negative for abdominal distention, abdominal pain, anal bleeding, blood in stool, constipation, diarrhea, nausea, rectal pain and vomiting.        Persistent dysgeusia.    Endocrine: Negative for cold intolerance, heat intolerance, polydipsia and polyuria.   Genitourinary: Negative for  "decreased urine volume, difficulty urinating, dysuria, flank pain, frequency, genital sores, hematuria and urgency.   Musculoskeletal: Positive for back pain. Negative for arthralgias, gait problem, joint swelling, myalgias, neck pain and neck stiffness.   Skin: Negative for color change, pallor and rash.   Neurological: Negative for dizziness, tremors, seizures, syncope, facial asymmetry, speech difficulty, weakness, light-headedness, numbness and headaches.   Hematological: Negative for adenopathy. Does not bruise/bleed easily.   Psychiatric/Behavioral: Negative for agitation, behavioral problems, confusion, decreased concentration, hallucinations, self-injury, sleep disturbance and suicidal ideas. The patient is not nervous/anxious.      Objective:    Vital Signs:  Vitals:    05/31/23 1521   BP: 127/78   Pulse: (!) 126   Temp: 98.1 °F (36.7 °C)   TempSrc: Oral   SpO2: 97%   Weight: 70 kg (154 lb 6.4 oz)   Height: 182.9 cm (72\")   PainSc: 0-No pain     Body mass index is 20.94 kg/m².    ECOG  (1) Restricted in physically strenuous activity, ambulatory and able to do work of light nature    Physical Exam:   Physical Exam  Constitutional:       General: He is not in acute distress.     Appearance: He is not ill-appearing, toxic-appearing or diaphoretic.      Comments: Appears chronically ill. Pale but not jaundiced.    HENT:      Head: Normocephalic and atraumatic.      Right Ear: External ear normal.      Left Ear: External ear normal.      Nose: Nose normal.      Mouth/Throat:      Mouth: Mucous membranes are moist.      Pharynx: Oropharynx is clear.   Eyes:      General: No scleral icterus.        Right eye: No discharge.         Left eye: No discharge.      Conjunctiva/sclera: Conjunctivae normal.      Pupils: Pupils are equal, round, and reactive to light.   Cardiovascular:      Rate and Rhythm: Normal rate and regular rhythm.      Pulses: Normal pulses.      Heart sounds: Normal heart sounds. No murmur " heard.    No friction rub. No gallop.   Pulmonary:      Effort: No respiratory distress.      Breath sounds: No stridor. No wheezing, rhonchi or rales.   Chest:      Chest wall: No tenderness.   Abdominal:      General: Abdomen is flat. Bowel sounds are normal. There is no distension.      Palpations: Abdomen is soft. There is no mass.      Tenderness: There is no abdominal tenderness. There is no right CVA tenderness, left CVA tenderness, guarding or rebound.   Musculoskeletal:         General: No swelling, tenderness, deformity or signs of injury.      Cervical back: No rigidity.      Right lower leg: No edema.      Left lower leg: No edema.   Lymphadenopathy:      Cervical: No cervical adenopathy.   Skin:     General: Skin is warm and dry.      Coloration: Skin is not jaundiced.      Findings: No bruising or rash.   Neurological:      General: No focal deficit present.      Mental Status: He is alert and oriented to person, place, and time.      Gait: Gait normal.   Psychiatric:         Mood and Affect: Mood normal.         Behavior: Behavior normal.         Thought Content: Thought content normal.         Judgment: Judgment normal.     ARELIS Jacob MD performed the physical exam on 6/31/2023 as documented above.     Lab Results - Last 18 Months   Lab Units 05/31/23  1524 05/26/23  0017 05/24/23  2258   WBC 10*3/mm3 10.79 11.70* 14.00*   HEMOGLOBIN g/dL 11.5* 10.6* 11.0*   HEMATOCRIT % 34.6* 32.3* 32.3*   PLATELETS 10*3/mm3 408 308 281   MCV fL 87.4 87.0 87.1     Lab Results - Last 18 Months   Lab Units 05/26/23  0017 05/24/23  2258 05/23/23  2238 05/23/23  2047 05/23/23  0248   SODIUM mmol/L 134* 135* 135* 136 133*   POTASSIUM mmol/L 3.8 4.0 3.6 3.6 4.7   CHLORIDE mmol/L 100 101 99 98 95*   CO2 mmol/L 24.0 22.0 25.0 25.0 24.0   BUN mg/dL 19 18 15 16 13   CREATININE mg/dL 0.69* 0.80 0.90 0.87 0.90   CALCIUM mg/dL 9.4 8.9 8.8 9.1 9.4   BILIRUBIN mg/dL  --   --  0.8 0.8 0.7   ALK PHOS U/L  --   --  256* 245*  269*   ALT (SGPT) U/L  --   --  33 34 41   AST (SGOT) U/L  --   --  33 31 41*   GLUCOSE mg/dL 136* 184* 105* 116* 103*     Lab Results   Component Value Date    GLUCOSE 136 (H) 05/26/2023    BUN 19 05/26/2023    CREATININE 0.69 (L) 05/26/2023    EGFRIFNONA 63 10/26/2021    BCR 27.5 (H) 05/26/2023    K 3.8 05/26/2023    CO2 24.0 05/26/2023    CALCIUM 9.4 05/26/2023    ALBUMIN 3.1 (L) 05/23/2023    AST 33 05/23/2023    ALT 33 05/23/2023     Assessment & Plan     1. Metastatic moderately to poorly differentiated adenocarcinoma of unclear origin. Discussed at length with him and his family both present in the exam room and on the phone. The natural history and the causes of this condition were described. I explained it to them as an incurable condition that can potentially be treated. The role of next generation sequencing and PD-L1 expression in deciding how to treat him was explained.   2. He will need port insertion and I communicated with Dr. Ambriz for coordination of care.   3. Reviewed all the records and the report of pathology. Communicated with Dr. Tay in Pathology  4. He is to see me in 2 weeks with results.     Jonathan Jacob MD on 5/31/2023 at 17:13

## 2023-05-31 ENCOUNTER — LAB (OUTPATIENT)
Dept: LAB | Facility: HOSPITAL | Age: 73
End: 2023-05-31

## 2023-05-31 ENCOUNTER — OFFICE VISIT (OUTPATIENT)
Dept: ONCOLOGY | Facility: CLINIC | Age: 73
End: 2023-05-31

## 2023-05-31 ENCOUNTER — TELEPHONE (OUTPATIENT)
Dept: ONCOLOGY | Facility: CLINIC | Age: 73
End: 2023-05-31

## 2023-05-31 VITALS
HEIGHT: 72 IN | TEMPERATURE: 98.1 F | WEIGHT: 154.4 LBS | HEART RATE: 126 BPM | DIASTOLIC BLOOD PRESSURE: 78 MMHG | BODY MASS INDEX: 20.91 KG/M2 | SYSTOLIC BLOOD PRESSURE: 127 MMHG | OXYGEN SATURATION: 97 %

## 2023-05-31 DIAGNOSIS — C61 PROSTATE CANCER: ICD-10-CM

## 2023-05-31 DIAGNOSIS — C79.9 METASTATIC MALIGNANT NEOPLASM, UNSPECIFIED SITE: ICD-10-CM

## 2023-05-31 DIAGNOSIS — C61 PROSTATE CANCER: Primary | ICD-10-CM

## 2023-05-31 DIAGNOSIS — C79.9 METASTATIC MALIGNANT NEOPLASM, UNSPECIFIED SITE: Primary | ICD-10-CM

## 2023-05-31 LAB
BASOPHILS # BLD AUTO: 0.05 10*3/MM3 (ref 0–0.2)
BASOPHILS NFR BLD AUTO: 0.5 % (ref 0–1.5)
DEPRECATED RDW RBC AUTO: 40 FL (ref 37–54)
EOSINOPHIL # BLD AUTO: 0.08 10*3/MM3 (ref 0–0.4)
EOSINOPHIL NFR BLD AUTO: 0.7 % (ref 0.3–6.2)
ERYTHROCYTE [DISTWIDTH] IN BLOOD BY AUTOMATED COUNT: 12.8 % (ref 12.3–15.4)
HCT VFR BLD AUTO: 34.6 % (ref 37.5–51)
HGB BLD-MCNC: 11.5 G/DL (ref 13–17.7)
HOLD SPECIMEN: NORMAL
HOLD SPECIMEN: NORMAL
LYMPHOCYTES # BLD AUTO: 0.8 10*3/MM3 (ref 0.7–3.1)
LYMPHOCYTES NFR BLD AUTO: 7.4 % (ref 19.6–45.3)
MCH RBC QN AUTO: 29 PG (ref 26.6–33)
MCHC RBC AUTO-ENTMCNC: 33.2 G/DL (ref 31.5–35.7)
MCV RBC AUTO: 87.4 FL (ref 79–97)
MONOCYTES # BLD AUTO: 1.36 10*3/MM3 (ref 0.1–0.9)
MONOCYTES NFR BLD AUTO: 12.6 % (ref 5–12)
NEUTROPHILS NFR BLD AUTO: 78.8 % (ref 42.7–76)
NEUTROPHILS NFR BLD AUTO: 8.5 10*3/MM3 (ref 1.7–7)
PLATELET # BLD AUTO: 408 10*3/MM3 (ref 140–450)
PMV BLD AUTO: 8.4 FL (ref 6–12)
RBC # BLD AUTO: 3.96 10*6/MM3 (ref 4.14–5.8)
WBC NRBC COR # BLD: 10.79 10*3/MM3 (ref 3.4–10.8)

## 2023-05-31 PROCEDURE — 85025 COMPLETE CBC W/AUTO DIFF WBC: CPT

## 2023-05-31 PROCEDURE — 36415 COLL VENOUS BLD VENIPUNCTURE: CPT

## 2023-05-31 RX ORDER — DEXAMETHASONE 4 MG/1
4 TABLET ORAL 2 TIMES DAILY WITH MEALS
Qty: 60 TABLET | Refills: 1 | Status: SHIPPED | OUTPATIENT
Start: 2023-05-31

## 2023-05-31 NOTE — TELEPHONE ENCOUNTER
AFTER SPEAKING WITH DR. MOORE, I CONTACTED [facility]. I SPOKE WITH LEIDY, PHARMACIST. I INFORMED LEIDY THAT PER DR. MOORE, THE PATIENT IS SUPPOSE TO TAKE 4 MG AT 8 AM AND 4 MG AT 4 PM FOR TWO WEEKS THEN ONCE THOSE TWO WEEKS ARE COMPLETED, HE WILL REDUCE DOWN TO TAKING 4 MG AT 8 AM ONLY. LEIDY VOICED UNDERSTANDING. NO FURTHER QUESTIONS AT THIS TIME.

## 2023-05-31 NOTE — TELEPHONE ENCOUNTER
Caller: [id number] - [place], [place] - [address]  - [phone]  - [phone] FX    Relationship: Pharmacy    Who are you requesting to speak with (clinical staff, provider,  specific staff member): CLINICAL    What was the call regarding: PHARMACY NEEDS CLARIFICATION ON DEXAMETHASONE 4MG INSTRUCTIONS, LAST SENTENCE SAYS REDUCE TO 8MG IN MORNING, THAT WILL NOT BE A REDUCTION.  PLEASE CALL BACK TO [facility]ISE.

## 2023-06-01 ENCOUNTER — TELEPHONE (OUTPATIENT)
Dept: ONCOLOGY | Facility: CLINIC | Age: 73
End: 2023-06-01
Payer: MEDICARE

## 2023-06-01 NOTE — TELEPHONE ENCOUNTER
Caller: [email or web address]    Relationship: Emergency Contact    Best call back number: [phone]    What is the best time to reach you: ANYTIME    Who are you requesting to speak with (clinical staff, provider, specific staff member): CLINICAL TEAM    What was the call regarding: OTILIO CALLING FOR AN UPDATE ON HER FMLA PAPERWORK BEING COMPLETED, NEEDS NO LATER THAN 6/8.    PLEASE CALL TO DISCUSS FURTHER.     Is it okay if the provider responds through [employer]: NO

## 2023-06-02 NOTE — TELEPHONE ENCOUNTER
ATTEMPTED TO CONTACT MR. YUSUF'S DAUGHTER REGARDING HER MESSAGE WE RECEIVED. UNABLE TO SPEAK WITH OTILIO. VOICEMAIL LEFT REQUESTING A CALLBACK. CALLBACK NUMBER LEFT ON VOICEMAIL.

## 2023-06-05 NOTE — TELEPHONE ENCOUNTER
ATTEMPTED TO CONTACT PATIENTS DAUGHTER OTILIO REGARDING HER MESSAGE WE RECEIVED. UNABLE TO SPEAK WITH OTILIO. VOICEMAIL LEFT REQUESTING A CALLBACK. CALLBACK NUMBER LEFT ON VOICEMAIL.

## 2023-06-05 NOTE — TELEPHONE ENCOUNTER
Caller: dallas narayan    Relationship to patient: Emergency Contact    Best call back number: [phone]    Patient is needing: TO RETURN CALL TO [facility].

## 2023-06-06 ENCOUNTER — OFFICE VISIT (OUTPATIENT)
Dept: SURGERY | Facility: CLINIC | Age: 73
End: 2023-06-06
Payer: MEDICARE

## 2023-06-06 VITALS
RESPIRATION RATE: 18 BRPM | WEIGHT: 139.4 LBS | OXYGEN SATURATION: 99 % | HEART RATE: 80 BPM | BODY MASS INDEX: 18.88 KG/M2 | DIASTOLIC BLOOD PRESSURE: 65 MMHG | TEMPERATURE: 97.8 F | SYSTOLIC BLOOD PRESSURE: 99 MMHG | HEIGHT: 72 IN

## 2023-06-06 DIAGNOSIS — C78.6 PERITONEAL CARCINOMATOSIS: Primary | ICD-10-CM

## 2023-06-06 PROCEDURE — 1159F MED LIST DOCD IN RCRD: CPT | Performed by: SURGERY

## 2023-06-06 PROCEDURE — 99024 POSTOP FOLLOW-UP VISIT: CPT | Performed by: SURGERY

## 2023-06-06 PROCEDURE — 1160F RVW MEDS BY RX/DR IN RCRD: CPT | Performed by: SURGERY

## 2023-06-06 NOTE — TELEPHONE ENCOUNTER
Caller: otilio narayan    Relationship to patient: Emergency Contact    Best call back number: [phone]    OTILIO IS RETURNING CALL TO PATIENT AND REQUEST CALL BACK ASAP. SHE HAS NOT BEEN ABLE TO GET IN CONTACT AND NEEDS PAPER WORK SUBMITTED TO COMPANY BY 6-9-23.

## 2023-06-06 NOTE — TELEPHONE ENCOUNTER
CONTACTED OTILIO TO OBTAIN A FAX NUMBER IN ORDER FOR US TO SEND IN HER COMPLETED [facility] PAPERWORK. OTILIO STATED SHE WILL VERIFY THE FAX NUMBER THEN RETURN MY PHONE CALL. I CONFIRMED.     OTILIO RETURNED MY PHONE CALL AND STATED THAT THE FAX NUMBER [phone] ATTN IVY FUENTES. I CONFIRMED.

## 2023-06-06 NOTE — PROGRESS NOTES
"Subjective   Cali Cavazos is a 72 y.o. male.   About 2 weeks out from exploratory laparotomy omental biopsy diverting loop ileostomy for peritoneal carcinomatosis.  In general he is doing okay still pretty weak getting a little bit stronger is able to eat some but mostly liquids has been drinking mostly protein shakes.  Says the ileostomy has been functioning well.  Has been pink and productive.    Objective   Resp 18   Ht 182.9 cm (72\")   Wt 63.2 kg (139 lb 6.4 oz)   BMI 18.91 kg/m²   Physical Exam  Marcie is soft minimally tender to palpation incisions healing with some minor hyperemia around the stable post especially inferiorly ileostomy is pink and viable  Assessment & Plan   Diagnoses and all orders for this visit:    1. Peritoneal carcinomatosis (Primary)    Diet as tolerated.  If they are going to be undergoing chemotherapy I have already counseled him about the risk and benefits of the port placement.  They would just need to notify me and I will get that scheduled.  Follow-up in about 1 month  Staples removed in the office      Floyd Ambriz MD  6/6/2023  8:34 AM EDT    This note was created using Dragon Voice Recognition software.  "

## 2023-06-06 NOTE — TELEPHONE ENCOUNTER
RETURNED OTILIO'S PHONE CALL REGARDING HER [facility] PAPERWORK. I INFORMED OTILIO THAT WE HAVE HER [facility] PAPERWORK COMPLETED AND WE WILL SEND IT IN TODAY. OTILIO CONFIRMED. SHE ASKED IF HER SISTER CAN COME INTO THE OFFICE TODAY TO OBTAIN A COPY OF THE PAPERWORK; I INFORMED HER THAT I AM CURRENTLY AT A SATELLITE LOCATION WITH DR. MOORE BUT STATED THAT SHE CAN  THE PAPERWORK TOMORROW. OTILIO CONFIRMED. NO FURTHER QUESTIONS AT THIS TIME.

## 2023-06-14 ENCOUNTER — LAB (OUTPATIENT)
Dept: LAB | Facility: HOSPITAL | Age: 73
End: 2023-06-14
Payer: MEDICARE

## 2023-06-14 ENCOUNTER — OFFICE VISIT (OUTPATIENT)
Dept: ONCOLOGY | Facility: CLINIC | Age: 73
End: 2023-06-14
Payer: MEDICARE

## 2023-06-14 VITALS
TEMPERATURE: 97.7 F | HEART RATE: 126 BPM | WEIGHT: 138.8 LBS | SYSTOLIC BLOOD PRESSURE: 104 MMHG | BODY MASS INDEX: 18.8 KG/M2 | DIASTOLIC BLOOD PRESSURE: 69 MMHG | HEIGHT: 72 IN | OXYGEN SATURATION: 97 %

## 2023-06-14 DIAGNOSIS — C79.9 METASTATIC MALIGNANT NEOPLASM, UNSPECIFIED SITE: ICD-10-CM

## 2023-06-14 DIAGNOSIS — C79.9 METASTATIC MALIGNANT NEOPLASM, UNSPECIFIED SITE: Primary | ICD-10-CM

## 2023-06-14 DIAGNOSIS — C61 PROSTATE CANCER: Primary | ICD-10-CM

## 2023-06-14 PROBLEM — C80.0 ADENOCARCINOMA CARCINOMATOSIS: Status: ACTIVE | Noted: 2023-06-14

## 2023-06-14 LAB
BASOPHILS # BLD AUTO: 0.08 10*3/MM3 (ref 0–0.2)
BASOPHILS NFR BLD AUTO: 0.8 % (ref 0–1.5)
DEPRECATED RDW RBC AUTO: 41.9 FL (ref 37–54)
EOSINOPHIL # BLD AUTO: 0.05 10*3/MM3 (ref 0–0.4)
EOSINOPHIL NFR BLD AUTO: 0.5 % (ref 0.3–6.2)
ERYTHROCYTE [DISTWIDTH] IN BLOOD BY AUTOMATED COUNT: 13.8 % (ref 12.3–15.4)
HCT VFR BLD AUTO: 34.1 % (ref 37.5–51)
HGB BLD-MCNC: 11.3 G/DL (ref 13–17.7)
HOLD SPECIMEN: NORMAL
HOLD SPECIMEN: NORMAL
LYMPHOCYTES # BLD AUTO: 0.94 10*3/MM3 (ref 0.7–3.1)
LYMPHOCYTES NFR BLD AUTO: 9.4 % (ref 19.6–45.3)
MCH RBC QN AUTO: 28 PG (ref 26.6–33)
MCHC RBC AUTO-ENTMCNC: 33.1 G/DL (ref 31.5–35.7)
MCV RBC AUTO: 84.6 FL (ref 79–97)
MONOCYTES # BLD AUTO: 1.4 10*3/MM3 (ref 0.1–0.9)
MONOCYTES NFR BLD AUTO: 13.9 % (ref 5–12)
NEUTROPHILS NFR BLD AUTO: 7.58 10*3/MM3 (ref 1.7–7)
NEUTROPHILS NFR BLD AUTO: 75.4 % (ref 42.7–76)
PLATELET # BLD AUTO: 455 10*3/MM3 (ref 140–450)
PMV BLD AUTO: 8.6 FL (ref 6–12)
RBC # BLD AUTO: 4.03 10*6/MM3 (ref 4.14–5.8)
WBC NRBC COR # BLD: 10.05 10*3/MM3 (ref 3.4–10.8)

## 2023-06-14 PROCEDURE — 85025 COMPLETE CBC W/AUTO DIFF WBC: CPT

## 2023-06-14 PROCEDURE — 36415 COLL VENOUS BLD VENIPUNCTURE: CPT

## 2023-06-14 RX ORDER — FLUOROURACIL 50 MG/ML
400 INJECTION, SOLUTION INTRAVENOUS ONCE
Status: CANCELLED | OUTPATIENT
Start: 2023-06-19

## 2023-06-14 RX ORDER — FAMOTIDINE 10 MG/ML
20 INJECTION, SOLUTION INTRAVENOUS AS NEEDED
Status: CANCELLED | OUTPATIENT
Start: 2023-06-19

## 2023-06-14 RX ORDER — DIPHENHYDRAMINE HYDROCHLORIDE 50 MG/ML
50 INJECTION INTRAMUSCULAR; INTRAVENOUS AS NEEDED
Status: CANCELLED | OUTPATIENT
Start: 2023-06-19

## 2023-06-14 NOTE — PROGRESS NOTES
HEMATOLOGY ONCOLOGY OUTPATIENT FOLLOW-UP       Patient name: Cali Cavazos  : 1950  MRN: [id number]  Primary Care Physician: William Crawford MD  Referring Physician: No ref. provider found  Reason For Consult: Metastatic malignancy. History of prostate cancer.     History of Present Illness:  Patient is a 72 y.o.     5/15/2023: Mr. Cavazos was referred for the investigation and treatment of what appeared to be a metastatic malignancy. His symptoms had started at the end of  or the beginning of . He first noted a loss of appetite. More important, however, he had dysgeusia. This led slowly to a significant reduction in the amount he ate and consequently he had a drop in his weight. Slowly he developed low back pain that became progressively more intense. His dysgeusia became more severe. He had imaging of the abdomen that reported findings consistent with metastatic disease involving lymph nodes in the ben hepatis, mid abdominal mesentery and the mid abdominal retroperitoneum. Some suggestion of peritoneal carcinomatosis was also present. But more important, there were several nodules in the liver, the largest of which was about 1.8 cm. No obvious source was present, though a non-specific lesion measuring 1.5 cm was present in the mid left kidney. Also a change in caliber of the colon near the level of the rectum. The exam revealed him to be a slender man in no distress. No jaundice and not ill or in distress. No jaundice. No oral lesions and no palpable adenopathy. Lungs diminished. The liver appeared enlarged to palpation of the abdomen; at the the level of the mid clavicular line it seemed to be approximately 5 cm below the costal margin and it was hard and nodular. Family history was not contributory to the present illness. A decision was made to obtain biopsy of the liver. Several laboratory exams were requested. To see me in 2 weeks.     2023: Mr. Cavazos  came to the emergency department complaining of progressive loss of his sense of well being. Profound anorexia and inability to eat anything were other prominent symptoms. As well, he was having abdominal pain. He was admitted for further investigations.     5/24/2023: Underwent exploratory laparotomy. He was found to have extensive peritoneal carcinomatosis. The sigmoid was tightly adhered to the peritoneal surfaces and could not be mobilized for a colostomy. For this reason he had an ileostomy created. He had recovery of bowel function and was discharged on 5/27/2023.     5/31/2023: Feeling better. Weak and not able to eat much. No nausea. Pain is under control and has been taking less analgesic. No dyspnea or chest pain. No dysuria. No edema. On exam appears ill. No distress. No jaundice. No oral lesions. The abdomen is flat and soft. Ileostomy in place; bowel sounds present. No edema. The final report of pathology was a moderately to poorly differentiated adenocarcinoma. Tumor cells were positive for CDX-2, cytokeratin AE1/3 and CA 19.9 and was focally positive for CK20. The tumor was negative for CK7, CD 56, TTF-1, napsin A, CK5/6, chromogranin, and synaptophysin. This immunohistochemical pattern was non-specific but upper digestive tract, including pancreaticobiliary origin was suggested. Next generation sequencing and PD-L1 expression was requested. The rationale of this was explained to them. Insertion of a port was requested and I asked him to see me in 2 weeks.     6/14/2023: Feeling about the same. Taking analgesic around the clock with good results. Eating poorly and taking protein supplements. No chest pain and no more dyspnea than before. Also not coughing more. The ileostomy had been functioning properly. ON exam pale, well hydrated and well oriented. Abdomen protuberant but soft and not tender. Ileostomy in place. No edema. Reviewed the laboratory exams. The final report of pathology was discussed  with him and his family. Plans to start treatment with FOLFOX were discussed to this end he will need a PICC as his surgeon was not enthusiastic about inserting a port. The option to continue supportive and symptomatic treatment only was also discussed and he wanted some time to think about the options. However, in order to obtain approval and have the adequate appointments, the plans were placed.     Subjective:  6/14/2023: Feeling about the same. Weak, tired and without appetite. Afebrile. No chest pain and no more dyspnea than before. The abdominal pain is well controlled. No diarrhea but still defecating per anus. No edema. No skin rash.     Past Medical History:   Diagnosis Date    Hypertension     Liver cancer 5/31/23    Dr Jacob    Prostate cancer      Past Surgical History:   Procedure Laterality Date    BACK SURGERY      two back surgeries 1996    COLONOSCOPY  2008    COLONOSCOPY N/A 05/23/2023    Procedure: COLONOSCOPY with rectal biopsy's;  Surgeon: Shiraz Adamson MD;  Location: [facility] ENDOSCOPY;  Service: Gastroenterology;  Laterality: N/A;  high grade distal colonic stricture    COLOSTOMY N/A 05/24/2023    Procedure: LOOP COLOSTOMY, PERITONEAL BIOPSY;  Surgeon: Floyd Ambriz MD;  Location: [facility] MAIN OR;  Service: General;  Laterality: N/A;    ENDOSCOPY N/A 05/23/2023    Procedure: ESOPHAGOGASTRODUODENOSCOPY;  Surgeon: Shiraz Adamson MD;  Location: [facility] ENDOSCOPY;  Service: Gastroenterology;  Laterality: N/A;  GERD       Current Outpatient Medications:     DPH-Lido-AlHydr-MgHydr-Simeth (First Mouthwash, Magic Mouthwash,) suspension, Swish and spit 5 mL Every 6 (Six) Hours., Disp: 360 mL, Rfl: 1    HYDROcodone-acetaminophen (NORCO) 7.5-325 MG per tablet, Take 1 tablet by mouth Every 6 (Six) Hours As Needed for Moderate Pain for up to 30 days., Disp: 120 tablet, Rfl: 0    lisinopril (PRINIVIL,ZESTRIL) 40 MG tablet, Take 1 tablet by mouth Daily., Disp: , Rfl:     LORazepam  (Ativan) 1 MG tablet, Take 1 tablet by mouth Every 8 (Eight) Hours As Needed for Anxiety., Disp: 10 tablet, Rfl: 0    metoprolol tartrate (LOPRESSOR) 50 MG tablet, Take 1 tablet by mouth 2 (Two) Times a Day With Meals., Disp: , Rfl:     mirtazapine (REMERON) 7.5 MG tablet, Take 1 tablet by mouth Every Night., Disp: , Rfl:     naloxone (NARCAN) 4 MG/0.1ML nasal spray, Call 911. Don't prime. [place] in 1 nostril for overdose. Repeat in 2-3 minutes in other nostril if no or minimal breathing/responsiveness., Disp: 2 each, Rfl: 0    oxyCODONE (ROXICODONE) 10 MG tablet, Take 1 tablet by mouth Every 6 (Six) Hours As Needed for Moderate Pain for up to 30 days., Disp: 120 each, Rfl: 0    dexamethasone (DECADRON) 4 MG tablet, Take 1 tablet by mouth 2 (Two) Times a Day With Meals. Take one dose at around 8 am and one dose at about 4 pm for two weeks. Then reduce to 8 mg in the morning only. (Patient not taking: Reported on 2023), Disp: 60 tablet, Rfl: 1    Allergies   Allergen Reactions    Penicillins Hives     Tolerated cefepime for 23 admission     Family History   Problem Relation Age of Onset    Stroke Mother     Stomach cancer Father 74    Throat cancer Sister     Cancer Brother         Unclear origin. Bladder?    Prostate cancer Brother 65    Arthritis Brother      Cancer-related family history includes Cancer in his brother; Prostate cancer (age of onset: 65) in his brother; Stomach cancer (age of onset: 74) in his father; Throat cancer in his sister.    Social History     Tobacco Use    Smoking status: Former     Packs/day: 1.00     Years: 50.00     Pack years: 50.00     Types: Cigarettes     Start date: 1968     Quit date: 2022     Years since quittin.1     Passive exposure: Past    Smokeless tobacco: Never   Vaping Use    Vaping Use: Never used   Substance Use Topics    Alcohol use: Not Currently    Drug use: Never     Social History     Social History Narrative    Not on file     ROS:   Review  of Systems   Constitutional:  Positive for activity change, appetite change, fatigue and unexpected weight change. Negative for chills, diaphoresis and fever.   HENT:  Negative for congestion, dental problem, drooling, ear discharge, ear pain, facial swelling, hearing loss, mouth sores, nosebleeds, postnasal drip, rhinorrhea, sinus pressure, sinus pain, sneezing, sore throat, tinnitus, trouble swallowing and voice change.    Eyes:  Negative for photophobia, pain, discharge, redness, itching and visual disturbance.   Respiratory:  Negative for apnea, cough, choking, chest tightness, shortness of breath, wheezing and stridor.    Cardiovascular:  Negative for chest pain, palpitations and leg swelling.   Gastrointestinal:  Negative for abdominal distention, abdominal pain, anal bleeding, blood in stool, constipation, diarrhea, nausea, rectal pain and vomiting.        Persistent dysgeusia.    Endocrine: Negative for cold intolerance, heat intolerance, polydipsia and polyuria.   Genitourinary:  Negative for decreased urine volume, difficulty urinating, dysuria, flank pain, frequency, genital sores, hematuria and urgency.   Musculoskeletal:  Positive for back pain. Negative for arthralgias, gait problem, joint swelling, myalgias, neck pain and neck stiffness.   Skin:  Negative for color change, pallor and rash.   Neurological:  Negative for dizziness, tremors, seizures, syncope, facial asymmetry, speech difficulty, weakness, light-headedness, numbness and headaches.   Hematological:  Negative for adenopathy. Does not bruise/bleed easily.   Psychiatric/Behavioral:  Negative for agitation, behavioral problems, confusion, decreased concentration, hallucinations, self-injury, sleep disturbance and suicidal ideas. The patient is not nervous/anxious.    Objective:    Vital Signs:  Vitals:    06/14/23 1515   BP: 104/69   Pulse: (!) 126   Temp: 97.7 °F (36.5 °C)   TempSrc: Oral   SpO2: 97%   Weight: 63 kg (138 lb 12.8 oz)  "  Height: 182.9 cm (72\")   PainSc: 0-No pain     Body mass index is 18.82 kg/m².    ECOG  (1) Restricted in physically strenuous activity, ambulatory and able to do work of light nature    Physical Exam:   Physical Exam  Constitutional:       General: He is not in acute distress.     Appearance: He is not ill-appearing, toxic-appearing or diaphoretic.      Comments: Appears chronically ill. Pale but not jaundiced.    HENT:      Head: Normocephalic and atraumatic.      Right Ear: External ear normal.      Left Ear: External ear normal.      Nose: Nose normal.      Mouth/Throat:      Mouth: Mucous membranes are moist.      Pharynx: Oropharynx is clear.   Eyes:      General: No scleral icterus.        Right eye: No discharge.         Left eye: No discharge.      Conjunctiva/sclera: Conjunctivae normal.      Pupils: Pupils are equal, round, and reactive to light.   Cardiovascular:      Rate and Rhythm: Normal rate and regular rhythm.      Pulses: Normal pulses.      Heart sounds: Normal heart sounds. No murmur heard.    No friction rub. No gallop.   Pulmonary:      Effort: No respiratory distress.      Breath sounds: No stridor. No wheezing, rhonchi or rales.   Chest:      Chest wall: No tenderness.   Abdominal:      General: Abdomen is flat. Bowel sounds are normal. There is no distension.      Palpations: Abdomen is soft. There is no mass.      Tenderness: There is no abdominal tenderness. There is no right CVA tenderness, left CVA tenderness, guarding or rebound.   Musculoskeletal:         General: No swelling, tenderness, deformity or signs of injury.      Cervical back: No rigidity.      Right lower leg: No edema.      Left lower leg: No edema.   Lymphadenopathy:      Cervical: No cervical adenopathy.   Skin:     General: Skin is warm and dry.      Coloration: Skin is not jaundiced.      Findings: No bruising or rash.   Neurological:      General: No focal deficit present.      Mental Status: He is alert and " oriented to person, place, and time.      Gait: Gait normal.   Psychiatric:         Mood and Affect: Mood normal.         Behavior: Behavior normal.         Thought Content: Thought content normal.         Judgment: Judgment normal.   ARELIS Jacob MD performed the physical exam on 6/14/2023 as documented above.     Lab Results - Last 18 Months   Lab Units 06/14/23  1512 05/31/23  1524 05/26/23  0017   WBC 10*3/mm3 10.05 10.79 11.70*   HEMOGLOBIN g/dL 11.3* 11.5* 10.6*   HEMATOCRIT % 34.1* 34.6* 32.3*   PLATELETS 10*3/mm3 455* 408 308   MCV fL 84.6 87.4 87.0       Lab Results - Last 18 Months   Lab Units 05/26/23  0017 05/24/23  2258 05/23/23 2238 05/23/23 2047 05/23/23  0248   SODIUM mmol/L 134* 135* 135* 136 133*   POTASSIUM mmol/L 3.8 4.0 3.6 3.6 4.7   CHLORIDE mmol/L 100 101 99 98 95*   CO2 mmol/L 24.0 22.0 25.0 25.0 24.0   BUN mg/dL 19 18 15 16 13   CREATININE mg/dL 0.69* 0.80 0.90 0.87 0.90   CALCIUM mg/dL 9.4 8.9 8.8 9.1 9.4   BILIRUBIN mg/dL  --   --  0.8 0.8 0.7   ALK PHOS U/L  --   --  256* 245* 269*   ALT (SGPT) U/L  --   --  33 34 41   AST (SGOT) U/L  --   --  33 31 41*   GLUCOSE mg/dL 136* 184* 105* 116* 103*       Lab Results   Component Value Date    GLUCOSE 136 (H) 05/26/2023    BUN 19 05/26/2023    CREATININE 0.69 (L) 05/26/2023    EGFRIFNONA 63 10/26/2021    BCR 27.5 (H) 05/26/2023    K 3.8 05/26/2023    CO2 24.0 05/26/2023    CALCIUM 9.4 05/26/2023    ALBUMIN 3.1 (L) 05/23/2023    AST 33 05/23/2023    ALT 33 05/23/2023     Assessment & Plan     1. Metastatic moderately to poorly differentiated adenocarcinoma of unclear origin, likely of upper gastrointestinal origin. To start treatment with FOLFOX as soon as approved. PICC to be inserted in the near future. Discussed with him and his family. Discussed the option to continue supportive ans symptomatic treatment only.   2. Reviewed all results, including the report of pathology.   3. The final report of the next generation sequencing and PD-L1  are not yet available.   4. He will return to see me in approximately 3 weeks. Tentatively he is to start  treatment on 6/19/2023.     Jonathan Jacob MD on 6/14/2023 at 16:34

## 2023-06-14 NOTE — PROGRESS NOTES
TREATMENT  PREPARATION    Cali Cavazos  [id number]  1950    Chief Complaint: Treatment preparation and needs assessment    History of present illness:  Cali Cavazos is a 72 y.o. year old male who is here today for treatment preparation and needs assessment.  The patient has been diagnosed with   Encounter Diagnoses   Name Primary?    Metastatic malignant neoplasm, unspecified site Yes    Peritoneal carcinomatosis     Metastasis to liver     Adenocarcinoma carcinomatosis     and is scheduled to begin treatment with:     Oncology History:    Oncology/Hematology History   Peritoneal carcinomatosis   5/22/2023 Initial Diagnosis    Peritoneal carcinomatosis     6/19/2023 -  Chemotherapy    OP COLON mFOLFOX6 OXALIplatin / Leucovorin / Fluorouracil       Metastasis to liver   5/26/2023 Initial Diagnosis    Metastasis to liver     6/19/2023 -  Chemotherapy    OP COLON mFOLFOX6 OXALIplatin / Leucovorin / Fluorouracil       Adenocarcinoma carcinomatosis   6/14/2023 Initial Diagnosis    Adenocarcinoma carcinomatosis     6/19/2023 -  Chemotherapy    OP COLON mFOLFOX6 OXALIplatin / Leucovorin / Fluorouracil           The current medication list and allergy list were reviewed and reconciled.     Past Medical History, Past Surgical History, Social History, Family History have been reviewed and are without significant changes except as mentioned.    Physical Exam:    Vitals:    06/15/23 0814   BP: 96/63   Pulse: 103   SpO2: 98%     Vitals:    06/15/23 0814   PainSc: 0-No pain                      Physical Exam  Vitals reviewed.   Constitutional:       General: He is not in acute distress.  HENT:      Head: Normocephalic and atraumatic.   Pulmonary:      Effort: Pulmonary effort is normal.   Musculoskeletal:         General: Normal range of motion.      Cervical back: Normal range of motion.      Comments: In wheelchair   Neurological:      Mental Status: He is alert and oriented to person, place, and time.    Psychiatric:         Mood and Affect: Mood normal.         Behavior: Behavior normal.         NEEDS ASSESSMENTS    Genetics  The patient's new diagnosis and family history have been reviewed for genetic counseling needs. The patient will not be referred..     Psychosocial and Barriers to care  Patient has home health in place.  The patient will not be referred to our .       Nutrition  Patients beginning at risk treatment regimens or who have dietary concerns will also be referred to our oncology dietitian. The patient will be referred.    Functional Assessment  The patient has home health in place.    Intravenous Access Assessment  The patient and I discussed planned intravenous chemo/biotherapy as well as other IV treatments that are often needed throughout the course of treatment. These may include, but are not limited to blood transfusions, antibiotics, and IV hydration. Discussed that depending on selected treatment and vein assessment, patient may require venous access device (VAD) which could include but not limited to a Mediport or PICC line. Risks and benefits of VADs reviewed. The patient will be treated via PICC.    Reproductive/Sexual Activity   People should avoid becoming pregnant and should not get a partner pregnant while undergoing chemo/biotherapy.  People of childbearing age should use effective contraception during active therapy. The best recommendation for all people is to use a barrier method for a minimum of 1 week after the last infusion of chemo/biotherapy to prevent your partner being exposed to byproducts from treatment medications in bodily fluids. Effective contraception should be discussed with your oncology team to make sure it is safe to take based on your diagnosis. Possible options include oral contraceptives, barrier methods. Chemo/biotherapy can change your ability to reproduce children in the future.  There are options for fertility preservation. NOT  "APPLICABLE    Advanced Care Planning  Advance Care Planning   The patient and I discussed advanced care planning, \"Conversations that Matter\".   This service is offered for development of advance directives with a certified ACP facilitator.  The patient does have an up-to-date advanced directive. This document is on file with our office. The patient is not interested in an appointment with one of our facilitators to create or update their advanced directives.               Smoking cessation  Tobacco Use: Medium Risk    Smoking Tobacco Use: Former    Smokeless Tobacco Use: Never    Passive Exposure: Past       Patient and I discussed their tobacco use history. Referral will not be made for smoking cessation.      Palliative Care  When appropriate, the patient and I discussed the availability palliative care services and when appropriate Hospice care. Palliative care is not the same as Hospice care which was explained to the patient.The patient is not interested in additional information from our  on these services.  The patient has previously been on hospice as an outpatient and now has home health through the same company.    Survivorship   When appropriate, we discussed that we will refer the patient to survivorship clinic to discuss next steps following completion of planned treatment.  Reviewed this visit will include assessment of your physical, psychological, functional, and spiritual needs as a survivor and the need at attend this visit when scheduled.    TREATMENT EDUCATION    Today I met with the patient to discuss the chemo/biotherapy regimen recommended for treatment of Metastatic malignant neoplasm, unspecified site    Peritoneal carcinomatosis  - ondansetron (ZOFRAN) 8 MG tablet    Metastasis to liver  - ondansetron (ZOFRAN) 8 MG tablet    Adenocarcinoma carcinomatosis  - ondansetron (ZOFRAN) 8 MG tablet  .  The patient was given explanation of treatment premed side effects including office " policy that prohibits patients to drive if sedating medications are administered, MD explanation given regarding benefits, side effects, toxicities and goals of treatment.  The patient received a Chemotherapy/Biotherapy Plan Summary including diagnosis and explanation of specific treatment plan.    SIDE EFFECTS:  Common side effects were discussed with the patient and/or significant other.  Discussion included where applicable hair loss/discoloration, anemia/fatigue, infection/chills/fever, appetite, bleeding risk/precautions, constipation, diarrhea, mouth sores, taste alteration, loss of appetite, nausea/vomiting, peripheral neuropathy, skin/nail changes, rash, muscle aches/weakness, photosensitivity, weight gain/loss, hearing loss, dizziness, menopausal symptoms, menstrual irregularity, sterility, high blood pressure, heart damage, liver damage, lung damage, kidney damage, DVT/PE risk, fluid retention, pleural/pericardial effusion, somnolence, electrolyte/LFT imbalance, vein exercises and/or the possible need for vascular access/port placement.  The patient was advised that although uncommon, leakage of an infused medication from the vein or venous access device may lead to skin breakdown and/or other tissue damage.  The patient was advised that he/she may have pain, bleeding, and/or bruising from the insertion of a needle in their vein or venous access device (port).  The patient was further advised that, in spite of proper technique, infection with redness and irritation may rarely occur at the site where the needle was inserted.  The patient was advised that if complications occur, additional medical treatment is available.  Finally, where applicable we have reviewed rare but potential immune mediated side effects including shortness of breath, cough, chest pain (pneumonitis), abdominal pain, diarrhea (colitis), thyroiditis (hypothyroid or hyperthyroid), hepatitis and liver dysfunction, nephritis and renal  dysfunction.    Discussion also included side effects specific to drugs in the treatment plan, specifically:    Treatment Plans       Name Type Plan Dates Plan Provider         Active    IP COLORECTAL mFOLFOX6 (OXALIplatin / Leucovorin / Fluorouracil CIV) ONCOLOGY TREATMENT  6/19/2023 - Present Jonathan Jacob MD                      Questions answered and additional information discussed on topics including:  Anemia, Thrombocytopenia, Neutropenia, Nutrition and appetite changes, Constipation, Diarrhea, Nausea & vomiting, Mouth sores, Alopecia, Intimate activity, Nervous system changes, Pain, Skin & nail changes, Organ toxicities, and Home care       Assessment and Plan:    Diagnoses and all orders for this visit:    1. Metastatic malignant neoplasm, unspecified site (Primary)    2. Peritoneal carcinomatosis  -     ondansetron (ZOFRAN) 8 MG tablet; Take 1 tablet by mouth 3 (Three) Times a Day As Needed for Nausea or Vomiting.  Dispense: 30 tablet; Refill: 5    3. Metastasis to liver  -     ondansetron (ZOFRAN) 8 MG tablet; Take 1 tablet by mouth 3 (Three) Times a Day As Needed for Nausea or Vomiting.  Dispense: 30 tablet; Refill: 5    4. Adenocarcinoma carcinomatosis  -     ondansetron (ZOFRAN) 8 MG tablet; Take 1 tablet by mouth 3 (Three) Times a Day As Needed for Nausea or Vomiting.  Dispense: 30 tablet; Refill: 5      No orders of the defined types were placed in this encounter.        The patient and I have reviewed their diagnosis and scheduled treatment plan. Needs assessment was completed where applicable including genetics, psychosocial needs, barriers to care, VAD evaluation, advanced care planning, survivorship, and palliative care services where indicated. Referrals have been ordered as appropriate based upon evaluation today and patient desires.   Chemo/biotherapy teaching was completed today and consent obtained. See separate documentation for further details.  Adequate time was given to answer  questions.  Patient made aware of their care team members and contact information if they have questions or problems throughout the treatment course.  Discussion held and written information provided describing frequency of office visits and ongoing monitoring throughout the treatment plan.     Reviewed with patient any prescribed medication sent to pharmacy.  Education provided regarding proper storage, safe handling, and proper disposal of unused medication.  Proper handling of body fluids and waste discussed and written information provided.  If appropriate, patient had pretreatment labs drawn today.    Learning assessment completed at initial patient encounter. See separate flowsheet. Chemo/biotherapy education comprehension assessed at today's visit.    I spent 60 minutes caring for Cali on this date of service. This time includes time spent by me in the following activities: preparing for the visit, obtaining and/or reviewing a separately obtained history, performing a medically appropriate examination and/or evaluation, counseling and educating the patient/family/caregiver, referring and communicating with other health care professionals, and documenting information in the medical record.     Maida Taveras, APRN   06/15/23    Discussion: The patient asked to defer signing his consent until after he and his wife were able to discuss the information that they received at the education appointment with his daughter who is a nurse.

## 2023-06-15 ENCOUNTER — OFFICE VISIT (OUTPATIENT)
Dept: ONCOLOGY | Facility: CLINIC | Age: 73
End: 2023-06-15
Payer: MEDICARE

## 2023-06-15 VITALS
DIASTOLIC BLOOD PRESSURE: 63 MMHG | HEART RATE: 103 BPM | OXYGEN SATURATION: 98 % | SYSTOLIC BLOOD PRESSURE: 96 MMHG | HEIGHT: 72 IN | BODY MASS INDEX: 18.69 KG/M2 | WEIGHT: 138 LBS

## 2023-06-15 DIAGNOSIS — C78.7 METASTASIS TO LIVER: ICD-10-CM

## 2023-06-15 DIAGNOSIS — C78.6 PERITONEAL CARCINOMATOSIS: ICD-10-CM

## 2023-06-15 DIAGNOSIS — C79.9 METASTATIC MALIGNANT NEOPLASM, UNSPECIFIED SITE: Primary | ICD-10-CM

## 2023-06-15 DIAGNOSIS — C80.0 ADENOCARCINOMA CARCINOMATOSIS: ICD-10-CM

## 2023-06-15 RX ORDER — ONDANSETRON HYDROCHLORIDE 8 MG/1
8 TABLET, FILM COATED ORAL 3 TIMES DAILY PRN
Qty: 30 TABLET | Refills: 5 | Status: SHIPPED | OUTPATIENT
Start: 2023-06-15

## 2023-06-16 ENCOUNTER — DOCUMENTATION (OUTPATIENT)
Dept: GASTROENTEROLOGY | Facility: HOSPITAL | Age: 73
End: 2023-06-16
Payer: MEDICARE

## 2023-06-17 ENCOUNTER — HOSPITAL ENCOUNTER (OUTPATIENT)
Dept: OTHER | Facility: HOSPITAL | Age: 73
Discharge: HOME | End: 2023-06-17
Payer: MEDICARE

## 2023-06-17 DIAGNOSIS — C79.9 METASTATIC MALIGNANT NEOPLASM, UNSPECIFIED SITE: ICD-10-CM

## 2023-06-17 PROCEDURE — C1751 CATH, INF, PER/CENT/MIDLINE: HCPCS

## 2023-06-17 NOTE — CONSULTS
picc team consult:    Procedure explained to patient and family.  Informed consent obtained and time out performed.  picc line placed RUE basilic vessel utilizing US guidance and modified seldinger technique with easily compressible vessel without difficulty.

## 2023-06-19 ENCOUNTER — HOSPITAL ENCOUNTER (OUTPATIENT)
Dept: ONCOLOGY | Facility: HOSPITAL | Age: 73
Discharge: HOME | End: 2023-06-19
Admitting: INTERNAL MEDICINE
Payer: MEDICARE

## 2023-06-19 VITALS
HEART RATE: 87 BPM | TEMPERATURE: 97.3 F | BODY MASS INDEX: 17.92 KG/M2 | SYSTOLIC BLOOD PRESSURE: 90 MMHG | DIASTOLIC BLOOD PRESSURE: 62 MMHG | OXYGEN SATURATION: 95 % | RESPIRATION RATE: 18 BRPM | WEIGHT: 132.1 LBS

## 2023-06-19 DIAGNOSIS — C78.7 METASTASIS TO LIVER: Primary | ICD-10-CM

## 2023-06-19 DIAGNOSIS — C78.6 PERITONEAL CARCINOMATOSIS: ICD-10-CM

## 2023-06-19 DIAGNOSIS — C80.0 ADENOCARCINOMA CARCINOMATOSIS: ICD-10-CM

## 2023-06-19 LAB
ALBUMIN SERPL-MCNC: 3.2 G/DL (ref 3.5–5.2)
ALBUMIN/GLOB SERPL: 1.1 G/DL
ALP SERPL-CCNC: 475 U/L (ref 39–117)
ALT SERPL W P-5'-P-CCNC: 46 U/L (ref 1–41)
ANION GAP SERPL CALCULATED.3IONS-SCNC: 12 MMOL/L (ref 5–15)
AST SERPL-CCNC: 50 U/L (ref 1–40)
BASOPHILS # BLD AUTO: 0.07 10*3/MM3 (ref 0–0.2)
BASOPHILS NFR BLD AUTO: 0.5 % (ref 0–1.5)
BILIRUB SERPL-MCNC: 0.6 MG/DL (ref 0–1.2)
BUN SERPL-MCNC: 15 MG/DL (ref 8–23)
BUN/CREAT SERPL: 21.1 (ref 7–25)
CALCIUM SPEC-SCNC: 10.1 MG/DL (ref 8.6–10.5)
CHLORIDE SERPL-SCNC: 88 MMOL/L (ref 98–107)
CO2 SERPL-SCNC: 24 MMOL/L (ref 22–29)
CREAT SERPL-MCNC: 0.71 MG/DL (ref 0.76–1.27)
DEPRECATED RDW RBC AUTO: 40.4 FL (ref 37–54)
EGFRCR SERPLBLD CKD-EPI 2021: 97.5 ML/MIN/1.73
EOSINOPHIL # BLD AUTO: 0.04 10*3/MM3 (ref 0–0.4)
EOSINOPHIL NFR BLD AUTO: 0.3 % (ref 0.3–6.2)
ERYTHROCYTE [DISTWIDTH] IN BLOOD BY AUTOMATED COUNT: 13.8 % (ref 12.3–15.4)
GLOBULIN UR ELPH-MCNC: 2.9 GM/DL
GLUCOSE SERPL-MCNC: 105 MG/DL (ref 65–99)
HCT VFR BLD AUTO: 32.1 % (ref 37.5–51)
HGB BLD-MCNC: 10.7 G/DL (ref 13–17.7)
LYMPHOCYTES # BLD AUTO: 0.68 10*3/MM3 (ref 0.7–3.1)
LYMPHOCYTES NFR BLD AUTO: 4.6 % (ref 19.6–45.3)
MCH RBC QN AUTO: 27.8 PG (ref 26.6–33)
MCHC RBC AUTO-ENTMCNC: 33.3 G/DL (ref 31.5–35.7)
MCV RBC AUTO: 83.4 FL (ref 79–97)
MONOCYTES # BLD AUTO: 1.19 10*3/MM3 (ref 0.1–0.9)
MONOCYTES NFR BLD AUTO: 8.1 % (ref 5–12)
NEUTROPHILS NFR BLD AUTO: 12.69 10*3/MM3 (ref 1.7–7)
NEUTROPHILS NFR BLD AUTO: 86.5 % (ref 42.7–76)
PLATELET # BLD AUTO: 346 10*3/MM3 (ref 140–450)
PMV BLD AUTO: 8.6 FL (ref 6–12)
POTASSIUM SERPL-SCNC: 4.5 MMOL/L (ref 3.5–5.2)
PROT SERPL-MCNC: 6.1 G/DL (ref 6–8.5)
RBC # BLD AUTO: 3.85 10*6/MM3 (ref 4.14–5.8)
SODIUM SERPL-SCNC: 124 MMOL/L (ref 136–145)
WBC NRBC COR # BLD: 14.67 10*3/MM3 (ref 3.4–10.8)

## 2023-06-19 PROCEDURE — 25010000002 LEUCOVORIN 200 MG RECONSTITUTED SOLUTION 200 MG VIAL: Performed by: INTERNAL MEDICINE

## 2023-06-19 PROCEDURE — 25010000002 DEXAMETHASONE SODIUM PHOSPHATE 120 MG/30ML SOLUTION: Performed by: INTERNAL MEDICINE

## 2023-06-19 PROCEDURE — 25010000002 OXALIPLATIN PER 0.5 MG: Performed by: INTERNAL MEDICINE

## 2023-06-19 PROCEDURE — 96368 THER/DIAG CONCURRENT INF: CPT

## 2023-06-19 PROCEDURE — 96411 CHEMO IV PUSH ADDL DRUG: CPT

## 2023-06-19 PROCEDURE — 96375 TX/PRO/DX INJ NEW DRUG ADDON: CPT

## 2023-06-19 PROCEDURE — 25010000002 FLUOROURACIL PER 500 MG: Performed by: INTERNAL MEDICINE

## 2023-06-19 PROCEDURE — 25010000002 LEUCOVORIN CALCIUM PER 50 MG: Performed by: INTERNAL MEDICINE

## 2023-06-19 PROCEDURE — 96367 TX/PROPH/DG ADDL SEQ IV INF: CPT

## 2023-06-19 PROCEDURE — 96413 CHEMO IV INFUSION 1 HR: CPT

## 2023-06-19 PROCEDURE — G0498 CHEMO EXTEND IV INFUS W/PUMP: HCPCS

## 2023-06-19 PROCEDURE — 96415 CHEMO IV INFUSION ADDL HR: CPT

## 2023-06-19 PROCEDURE — 85025 COMPLETE CBC W/AUTO DIFF WBC: CPT | Performed by: INTERNAL MEDICINE

## 2023-06-19 PROCEDURE — 96416 CHEMO PROLONG INFUSE W/PUMP: CPT

## 2023-06-19 PROCEDURE — 25010000002 PALONOSETRON 0.25 MG/5ML SOLUTION PREFILLED SYRINGE: Performed by: INTERNAL MEDICINE

## 2023-06-19 RX ORDER — FLUOROURACIL 50 MG/ML
400 INJECTION, SOLUTION INTRAVENOUS ONCE
Status: COMPLETED | OUTPATIENT
Start: 2023-06-19 | End: 2023-06-19

## 2023-06-19 RX ORDER — PALONOSETRON 0.05 MG/ML
0.25 INJECTION, SOLUTION INTRAVENOUS ONCE
Status: COMPLETED | OUTPATIENT
Start: 2023-06-19 | End: 2023-06-19

## 2023-06-19 RX ORDER — FLUOROURACIL 50 MG/ML
400 INJECTION, SOLUTION INTRAVENOUS ONCE
Status: CANCELLED | OUTPATIENT
Start: 2023-06-19

## 2023-06-19 RX ORDER — DEXTROSE MONOHYDRATE 50 MG/ML
250 INJECTION, SOLUTION INTRAVENOUS ONCE
Status: COMPLETED | OUTPATIENT
Start: 2023-06-19 | End: 2023-06-19

## 2023-06-19 RX ORDER — DIPHENHYDRAMINE HYDROCHLORIDE 50 MG/ML
50 INJECTION INTRAMUSCULAR; INTRAVENOUS AS NEEDED
Status: CANCELLED | OUTPATIENT
Start: 2023-06-19

## 2023-06-19 RX ORDER — SODIUM CHLORIDE 0.9 % (FLUSH) 0.9 %
20 SYRINGE (ML) INJECTION DAILY
Qty: 600 ML | Refills: 2 | Status: ON HOLD | OUTPATIENT
Start: 2023-06-19 | End: 2023-06-26

## 2023-06-19 RX ORDER — FAMOTIDINE 10 MG/ML
20 INJECTION, SOLUTION INTRAVENOUS AS NEEDED
Status: CANCELLED | OUTPATIENT
Start: 2023-06-19

## 2023-06-19 RX ADMIN — PALONOSETRON 0.25 MG: 0.25 INJECTION, SOLUTION INTRAVENOUS at 08:29

## 2023-06-19 RX ADMIN — OXALIPLATIN 150 MG: 5 INJECTION, SOLUTION INTRAVENOUS at 09:06

## 2023-06-19 RX ADMIN — DEXAMETHASONE SODIUM PHOSPHATE 12 MG: 4 INJECTION, SOLUTION INTRA-ARTICULAR; INTRALESIONAL; INTRAMUSCULAR; INTRAVENOUS; SOFT TISSUE at 08:34

## 2023-06-19 RX ADMIN — DEXTROSE MONOHYDRATE 250 ML: 50 INJECTION, SOLUTION INTRAVENOUS at 08:29

## 2023-06-19 RX ADMIN — FLUOROURACIL 4370 MG: 50 INJECTION, SOLUTION INTRAVENOUS at 11:20

## 2023-06-19 RX ADMIN — LEUCOVORIN CALCIUM 730 MG: 350 INJECTION, POWDER, LYOPHILIZED, FOR SOLUTION INTRAMUSCULAR; INTRAVENOUS at 09:04

## 2023-06-19 RX ADMIN — FLUOROURACIL 730 MG: 50 INJECTION, SOLUTION INTRAVENOUS at 11:15

## 2023-06-19 NOTE — PROGRESS NOTES
Message below sent to MD Dr. Jacob, pt. is here for C1D1 Folfox, CBC results WBC 14.67, HGB 10.7, Plts 346, [place] [id number], COUld you sign the treatment plan?   Please proceed with treatment per Dr. Jacob  Treatment given as ordered and pt. Tolerated well.   Reviewed chemo home pump care and instructions with pt. And pt's wife. Also gave pt. A chemo spill kit to take home with him.   Instructed pt. And pt's wife that Dr. Jacob's nurse Talha sent in saline flushes to pt's pharmacy.   Pt. And pt's wife verbalized understanding.   Pt. Discharged from clinic with no complaints. Pt's wife states she has a copy of pt's schedule at home.

## 2023-06-21 PROBLEM — Z45.89 ENCOUNTER FOR MANAGEMENT OF PERIPHERALLY INSERTED CENTRAL CATHETER (PICC): Status: ACTIVE | Noted: 2023-06-21

## 2023-06-25 PROBLEM — R00.0 TACHYCARDIA: Status: ACTIVE | Noted: 2023-06-25

## 2023-06-25 PROBLEM — I95.9 HYPOTENSION: Status: ACTIVE | Noted: 2023-06-25

## 2023-06-25 PROBLEM — R53.1 WEAKNESS: Status: ACTIVE | Noted: 2023-06-25

## 2023-06-26 PROBLEM — I95.9 HYPOTENSION, UNSPECIFIED HYPOTENSION TYPE: Status: ACTIVE | Noted: 2023-06-26

## 2023-07-01 PROBLEM — E86.0 ACUTE DEHYDRATION: Status: ACTIVE | Noted: 2023-07-01

## 2023-07-01 PROBLEM — N18.2 CKD (CHRONIC KIDNEY DISEASE), STAGE II: Status: ACTIVE | Noted: 2023-07-01

## 2023-07-01 PROBLEM — I12.9 BENIGN HYPERTENSION WITH CKD (CHRONIC KIDNEY DISEASE), STAGE II: Status: ACTIVE | Noted: 2023-07-01

## 2023-07-01 PROBLEM — K21.9 GASTROESOPHAGEAL REFLUX DISEASE: Status: ACTIVE | Noted: 2023-07-01

## 2023-07-01 PROBLEM — N18.2 BENIGN HYPERTENSION WITH CKD (CHRONIC KIDNEY DISEASE), STAGE II: Status: ACTIVE | Noted: 2023-07-01

## 2023-07-01 PROBLEM — Z85.46 HISTORY OF PROSTATE CANCER: Status: ACTIVE | Noted: 2023-07-01

## 2023-07-05 ENCOUNTER — TELEPHONE (OUTPATIENT)
Dept: ONCOLOGY | Facility: CLINIC | Age: 73
End: 2023-07-05

## 2023-07-24 ENCOUNTER — HOSPITAL ENCOUNTER (OUTPATIENT)
Dept: ONCOLOGY | Facility: HOSPITAL | Age: 73
Discharge: HOME | End: 2023-07-24
Admitting: INTERNAL MEDICINE
Payer: MEDICARE

## 2023-07-24 VITALS
HEART RATE: 96 BPM | OXYGEN SATURATION: 99 % | DIASTOLIC BLOOD PRESSURE: 68 MMHG | BODY MASS INDEX: 14.8 KG/M2 | SYSTOLIC BLOOD PRESSURE: 92 MMHG | TEMPERATURE: 97.7 F | WEIGHT: 109.1 LBS

## 2023-07-24 DIAGNOSIS — C80.0 ADENOCARCINOMA CARCINOMATOSIS: Primary | ICD-10-CM

## 2023-07-24 DIAGNOSIS — C78.6 PERITONEAL CARCINOMATOSIS: ICD-10-CM

## 2023-07-24 DIAGNOSIS — C78.7 METASTASIS TO LIVER: ICD-10-CM

## 2023-07-24 LAB
ALBUMIN SERPL-MCNC: 3.2 G/DL (ref 3.5–5.2)
ALBUMIN/GLOB SERPL: 0.8 G/DL
ALP SERPL-CCNC: 252 U/L (ref 39–117)
ALT SERPL W P-5'-P-CCNC: 16 U/L (ref 1–41)
ANION GAP SERPL CALCULATED.3IONS-SCNC: 10 MMOL/L (ref 5–15)
AST SERPL-CCNC: 17 U/L (ref 1–40)
BASOPHILS # BLD AUTO: 0.11 10*3/MM3 (ref 0–0.2)
BASOPHILS NFR BLD AUTO: 1.6 % (ref 0–1.5)
BILIRUB SERPL-MCNC: 0.5 MG/DL (ref 0–1.2)
BUN SERPL-MCNC: 16 MG/DL (ref 8–23)
BUN/CREAT SERPL: 19.5 (ref 7–25)
CALCIUM SPEC-SCNC: 9.9 MG/DL (ref 8.6–10.5)
CHLORIDE SERPL-SCNC: 93 MMOL/L (ref 98–107)
CO2 SERPL-SCNC: 23 MMOL/L (ref 22–29)
CREAT SERPL-MCNC: 0.82 MG/DL (ref 0.76–1.27)
DEPRECATED RDW RBC AUTO: 46.6 FL (ref 37–54)
EGFRCR SERPLBLD CKD-EPI 2021: 92.8 ML/MIN/1.73
EOSINOPHIL # BLD AUTO: 0.05 10*3/MM3 (ref 0–0.4)
EOSINOPHIL NFR BLD AUTO: 0.7 % (ref 0.3–6.2)
ERYTHROCYTE [DISTWIDTH] IN BLOOD BY AUTOMATED COUNT: 15.6 % (ref 12.3–15.4)
GLOBULIN UR ELPH-MCNC: 4.1 GM/DL
GLUCOSE SERPL-MCNC: 109 MG/DL (ref 65–99)
HCT VFR BLD AUTO: 32 % (ref 37.5–51)
HGB BLD-MCNC: 10.2 G/DL (ref 13–17.7)
LYMPHOCYTES # BLD AUTO: 0.96 10*3/MM3 (ref 0.7–3.1)
LYMPHOCYTES NFR BLD AUTO: 13.9 % (ref 19.6–45.3)
MCH RBC QN AUTO: 26.8 PG (ref 26.6–33)
MCHC RBC AUTO-ENTMCNC: 31.9 G/DL (ref 31.5–35.7)
MCV RBC AUTO: 84.2 FL (ref 79–97)
MONOCYTES # BLD AUTO: 0.8 10*3/MM3 (ref 0.1–0.9)
MONOCYTES NFR BLD AUTO: 11.6 % (ref 5–12)
NEUTROPHILS NFR BLD AUTO: 4.97 10*3/MM3 (ref 1.7–7)
NEUTROPHILS NFR BLD AUTO: 72.2 % (ref 42.7–76)
PLATELET # BLD AUTO: 354 10*3/MM3 (ref 140–450)
PMV BLD AUTO: 8.9 FL (ref 6–12)
POTASSIUM SERPL-SCNC: 4.1 MMOL/L (ref 3.5–5.2)
PROT SERPL-MCNC: 7.3 G/DL (ref 6–8.5)
RBC # BLD AUTO: 3.8 10*6/MM3 (ref 4.14–5.8)
SODIUM SERPL-SCNC: 126 MMOL/L (ref 136–145)
WBC NRBC COR # BLD: 6.89 10*3/MM3 (ref 3.4–10.8)

## 2023-07-24 PROCEDURE — 85025 COMPLETE CBC W/AUTO DIFF WBC: CPT | Performed by: INTERNAL MEDICINE

## 2023-07-24 PROCEDURE — 96549 UNLISTED CHEMOTHERAPY PX: CPT

## 2023-07-24 PROCEDURE — 96367 TX/PROPH/DG ADDL SEQ IV INF: CPT

## 2023-07-24 PROCEDURE — 25010000002 DEXAMETHASONE SODIUM PHOSPHATE 120 MG/30ML SOLUTION: Performed by: INTERNAL MEDICINE

## 2023-07-24 PROCEDURE — 96411 CHEMO IV PUSH ADDL DRUG: CPT

## 2023-07-24 PROCEDURE — 80053 COMPREHEN METABOLIC PANEL: CPT | Performed by: INTERNAL MEDICINE

## 2023-07-24 PROCEDURE — 96368 THER/DIAG CONCURRENT INF: CPT

## 2023-07-24 PROCEDURE — 36592 COLLECT BLOOD FROM PICC: CPT

## 2023-07-24 PROCEDURE — 96416 CHEMO PROLONG INFUSE W/PUMP: CPT

## 2023-07-24 PROCEDURE — 25010000002 LEUCOVORIN 200 MG RECONSTITUTED SOLUTION 200 MG VIAL: Performed by: INTERNAL MEDICINE

## 2023-07-24 PROCEDURE — 25010000002 FLUOROURACIL PER 500 MG: Performed by: INTERNAL MEDICINE

## 2023-07-24 PROCEDURE — 25010000002 OXALIPLATIN PER 0.5 MG: Performed by: INTERNAL MEDICINE

## 2023-07-24 PROCEDURE — 25010000002 LEUCOVORIN 500 MG RECONSTITUTED SOLUTION 1 EACH VIAL: Performed by: INTERNAL MEDICINE

## 2023-07-24 PROCEDURE — 25010000002 PALONOSETRON 0.25 MG/5ML SOLUTION PREFILLED SYRINGE: Performed by: INTERNAL MEDICINE

## 2023-07-24 PROCEDURE — 96375 TX/PRO/DX INJ NEW DRUG ADDON: CPT

## 2023-07-24 PROCEDURE — G0498 CHEMO EXTEND IV INFUS W/PUMP: HCPCS

## 2023-07-24 PROCEDURE — 96415 CHEMO IV INFUSION ADDL HR: CPT

## 2023-07-24 PROCEDURE — 96413 CHEMO IV INFUSION 1 HR: CPT

## 2023-07-24 RX ORDER — PALONOSETRON 0.05 MG/ML
0.25 INJECTION, SOLUTION INTRAVENOUS ONCE
Status: COMPLETED | OUTPATIENT
Start: 2023-07-24 | End: 2023-07-24

## 2023-07-24 RX ORDER — DEXTROSE MONOHYDRATE 50 MG/ML
250 INJECTION, SOLUTION INTRAVENOUS ONCE
Status: COMPLETED | OUTPATIENT
Start: 2023-07-24 | End: 2023-07-24

## 2023-07-24 RX ORDER — FLUOROURACIL 50 MG/ML
650 INJECTION, SOLUTION INTRAVENOUS ONCE
Status: COMPLETED | OUTPATIENT
Start: 2023-07-24 | End: 2023-07-24

## 2023-07-24 RX ADMIN — FLUOROURACIL 3960 MG: 50 INJECTION, SOLUTION INTRAVENOUS at 14:16

## 2023-07-24 RX ADMIN — PALONOSETRON 0.25 MG: 0.25 INJECTION, SOLUTION INTRAVENOUS at 11:29

## 2023-07-24 RX ADMIN — DEXTROSE MONOHYDRATE 250 ML: 50 INJECTION, SOLUTION INTRAVENOUS at 11:28

## 2023-07-24 RX ADMIN — OXALIPLATIN 100 MG: 100 INJECTION, SOLUTION, CONCENTRATE INTRAVENOUS at 12:05

## 2023-07-24 RX ADMIN — DEXAMETHASONE SODIUM PHOSPHATE 12 MG: 4 INJECTION, SOLUTION INTRA-ARTICULAR; INTRALESIONAL; INTRAMUSCULAR; INTRAVENOUS; SOFT TISSUE at 11:28

## 2023-07-24 RX ADMIN — FLUOROURACIL 650 MG: 50 INJECTION, SOLUTION INTRAVENOUS at 14:12

## 2023-07-24 RX ADMIN — LEUCOVORIN CALCIUM 650 MG: 500 INJECTION, POWDER, LYOPHILIZED, FOR SOLUTION INTRAMUSCULAR; INTRAVENOUS at 12:05

## 2023-07-24 NOTE — PROGRESS NOTES
Patient here for C2 Folfox. Patient's PICC single lumen PICC line was flushed, blood return noted. Specimens were collected and sent to lab for processing. Dr. Jacob was made aware of patient's 20 pound weight loss. All lab values were within normal limits for treatment and per Dr. Jacob I proceeded with treatment. Patient tolerated treatment, AVS was given and discharged home with understanding.

## 2023-07-26 ENCOUNTER — HOSPITAL ENCOUNTER (OUTPATIENT)
Dept: ONCOLOGY | Facility: HOSPITAL | Age: 73
Discharge: HOME | End: 2023-07-26
Admitting: INTERNAL MEDICINE
Payer: MEDICARE

## 2023-07-26 VITALS — HEART RATE: 89 BPM | DIASTOLIC BLOOD PRESSURE: 68 MMHG | SYSTOLIC BLOOD PRESSURE: 100 MMHG

## 2023-07-26 DIAGNOSIS — C78.7 METASTASIS TO LIVER: ICD-10-CM

## 2023-07-26 DIAGNOSIS — Z45.89 ENCOUNTER FOR MANAGEMENT OF PERIPHERALLY INSERTED CENTRAL CATHETER (PICC): ICD-10-CM

## 2023-07-26 DIAGNOSIS — C78.6 PERITONEAL CARCINOMATOSIS: Primary | ICD-10-CM

## 2023-07-26 DIAGNOSIS — C80.0 ADENOCARCINOMA CARCINOMATOSIS: ICD-10-CM

## 2023-07-26 PROCEDURE — 96360 HYDRATION IV INFUSION INIT: CPT

## 2023-07-26 RX ORDER — SODIUM CHLORIDE 0.9 % (FLUSH) 0.9 %
20 SYRINGE (ML) INJECTION AS NEEDED
OUTPATIENT
Start: 2023-07-26

## 2023-07-26 RX ORDER — SODIUM CHLORIDE 0.9 % (FLUSH) 0.9 %
20 SYRINGE (ML) INJECTION AS NEEDED
Status: DISCONTINUED | OUTPATIENT
Start: 2023-07-26 | End: 2023-07-27 | Stop reason: HOSPADM

## 2023-07-26 RX ADMIN — Medication 20 ML: at 14:13

## 2023-07-26 RX ADMIN — SODIUM CHLORIDE 1000 ML: 9 INJECTION, SOLUTION INTRAVENOUS at 13:23

## 2023-07-26 NOTE — PROGRESS NOTES
Message below sent to MD Dr. Jacob, pt. is here for his pump DC and IVF's, pt's BP 85/64 when he cam in. I rechecked BP 99/70, pt. states he has no diarrhea, his ileostomy is putting out normal pudding like stool which he empty's 5 to 6 times per day. He states he's been eating and drinking his normal amount with some improvement in appetite. Pt. is on Lisinopril 40mg daily, and Metoprolol tartrate 50mg BID. I'll recheck his BP before he leaves today. He also comes back on Friday for IVF's.   Response per Dr. Jacob  let's please ask him to stop the lisinopril. his sodium was a little low on Monday. if he is feeling ok I'd rather not give him the iv fluids today, but I'd like to bring him in on fri to recheck. would you please ask him to come I'll put in orders for a cbc and chemistry   Lab orders entered per Dr. Jacob,   Stop IVF's today, Pt. Instructed to stop the Lisinopril and pt. Will return to clinic on Friday for lab draw and possible IVF's per Dr. Jacob  Pt. And pt's wife verbalized understanding.   Pt. Discharged from clinic with no complaints, no AVS given today. Pt's wife stated she has a copy of his schedule at home.

## 2023-07-28 ENCOUNTER — HOSPITAL ENCOUNTER (OUTPATIENT)
Dept: ONCOLOGY | Facility: HOSPITAL | Age: 73
Discharge: HOME | End: 2023-07-28
Payer: MEDICARE

## 2023-07-28 VITALS
HEART RATE: 92 BPM | RESPIRATION RATE: 18 BRPM | DIASTOLIC BLOOD PRESSURE: 66 MMHG | TEMPERATURE: 97.7 F | OXYGEN SATURATION: 100 % | HEIGHT: 72 IN | BODY MASS INDEX: 14.52 KG/M2 | SYSTOLIC BLOOD PRESSURE: 96 MMHG | WEIGHT: 107.2 LBS

## 2023-07-28 DIAGNOSIS — C78.6 PERITONEAL CARCINOMATOSIS: Primary | ICD-10-CM

## 2023-07-28 LAB
ALBUMIN SERPL-MCNC: 3 G/DL (ref 3.5–5.2)
ALBUMIN/GLOB SERPL: 0.8 G/DL
ALP SERPL-CCNC: 212 U/L (ref 39–117)
ALT SERPL W P-5'-P-CCNC: 20 U/L (ref 1–41)
ANION GAP SERPL CALCULATED.3IONS-SCNC: 11 MMOL/L (ref 5–15)
AST SERPL-CCNC: 18 U/L (ref 1–40)
BASOPHILS # BLD AUTO: 0.03 10*3/MM3 (ref 0–0.2)
BASOPHILS NFR BLD AUTO: 0.5 % (ref 0–1.5)
BILIRUB SERPL-MCNC: 0.5 MG/DL (ref 0–1.2)
BUN SERPL-MCNC: 24 MG/DL (ref 8–23)
BUN/CREAT SERPL: 29.3 (ref 7–25)
CALCIUM SPEC-SCNC: 10 MG/DL (ref 8.6–10.5)
CHLORIDE SERPL-SCNC: 92 MMOL/L (ref 98–107)
CO2 SERPL-SCNC: 23 MMOL/L (ref 22–29)
CREAT SERPL-MCNC: 0.82 MG/DL (ref 0.76–1.27)
DEPRECATED RDW RBC AUTO: 47.2 FL (ref 37–54)
EGFRCR SERPLBLD CKD-EPI 2021: 92.8 ML/MIN/1.73
EOSINOPHIL # BLD AUTO: 0.07 10*3/MM3 (ref 0–0.4)
EOSINOPHIL NFR BLD AUTO: 1.1 % (ref 0.3–6.2)
ERYTHROCYTE [DISTWIDTH] IN BLOOD BY AUTOMATED COUNT: 16.1 % (ref 12.3–15.4)
GLOBULIN UR ELPH-MCNC: 4 GM/DL
GLUCOSE SERPL-MCNC: 117 MG/DL (ref 65–99)
HCT VFR BLD AUTO: 31.5 % (ref 37.5–51)
HGB BLD-MCNC: 10.5 G/DL (ref 13–17.7)
LYMPHOCYTES # BLD AUTO: 0.69 10*3/MM3 (ref 0.7–3.1)
LYMPHOCYTES NFR BLD AUTO: 11.3 % (ref 19.6–45.3)
MCH RBC QN AUTO: 27.8 PG (ref 26.6–33)
MCHC RBC AUTO-ENTMCNC: 33.3 G/DL (ref 31.5–35.7)
MCV RBC AUTO: 83.3 FL (ref 79–97)
MONOCYTES # BLD AUTO: 0.1 10*3/MM3 (ref 0.1–0.9)
MONOCYTES NFR BLD AUTO: 1.6 % (ref 5–12)
NEUTROPHILS NFR BLD AUTO: 5.21 10*3/MM3 (ref 1.7–7)
NEUTROPHILS NFR BLD AUTO: 85.5 % (ref 42.7–76)
PLATELET # BLD AUTO: 324 10*3/MM3 (ref 140–450)
PMV BLD AUTO: 9.3 FL (ref 6–12)
POTASSIUM SERPL-SCNC: 4.2 MMOL/L (ref 3.5–5.2)
PROT SERPL-MCNC: 7 G/DL (ref 6–8.5)
RBC # BLD AUTO: 3.78 10*6/MM3 (ref 4.14–5.8)
SODIUM SERPL-SCNC: 126 MMOL/L (ref 136–145)
WBC NRBC COR # BLD: 6.1 10*3/MM3 (ref 3.4–10.8)

## 2023-07-28 PROCEDURE — 85025 COMPLETE CBC W/AUTO DIFF WBC: CPT | Performed by: INTERNAL MEDICINE

## 2023-07-28 PROCEDURE — 80053 COMPREHEN METABOLIC PANEL: CPT | Performed by: INTERNAL MEDICINE

## 2023-07-28 PROCEDURE — 96360 HYDRATION IV INFUSION INIT: CPT

## 2023-07-28 RX ADMIN — SODIUM CHLORIDE 500 ML: 9 INJECTION, SOLUTION INTRAVENOUS at 14:53

## 2023-07-31 LAB — REF LAB TEST METHOD: NORMAL

## 2023-08-02 ENCOUNTER — HOSPITAL ENCOUNTER (OUTPATIENT)
Dept: ONCOLOGY | Facility: HOSPITAL | Age: 73
Discharge: HOME | End: 2023-08-02
Admitting: INTERNAL MEDICINE
Payer: MEDICARE

## 2023-08-02 DIAGNOSIS — C78.6 PERITONEAL CARCINOMATOSIS: ICD-10-CM

## 2023-08-02 DIAGNOSIS — Z45.89 ENCOUNTER FOR MANAGEMENT OF PERIPHERALLY INSERTED CENTRAL CATHETER (PICC): Primary | ICD-10-CM

## 2023-08-02 LAB
BASOPHILS # BLD AUTO: 0.06 10*3/MM3 (ref 0–0.2)
BASOPHILS NFR BLD AUTO: 1.3 % (ref 0–1.5)
DEPRECATED RDW RBC AUTO: 44.2 FL (ref 37–54)
EOSINOPHIL # BLD AUTO: 0.08 10*3/MM3 (ref 0–0.4)
EOSINOPHIL NFR BLD AUTO: 1.8 % (ref 0.3–6.2)
ERYTHROCYTE [DISTWIDTH] IN BLOOD BY AUTOMATED COUNT: 15.3 % (ref 12.3–15.4)
HCT VFR BLD AUTO: 30.8 % (ref 37.5–51)
HGB BLD-MCNC: 10.2 G/DL (ref 13–17.7)
LYMPHOCYTES # BLD AUTO: 0.83 10*3/MM3 (ref 0.7–3.1)
LYMPHOCYTES NFR BLD AUTO: 18.6 % (ref 19.6–45.3)
MCH RBC QN AUTO: 27.1 PG (ref 26.6–33)
MCHC RBC AUTO-ENTMCNC: 33.1 G/DL (ref 31.5–35.7)
MCV RBC AUTO: 81.9 FL (ref 79–97)
MONOCYTES # BLD AUTO: 0.37 10*3/MM3 (ref 0.1–0.9)
MONOCYTES NFR BLD AUTO: 8.3 % (ref 5–12)
NEUTROPHILS NFR BLD AUTO: 3.12 10*3/MM3 (ref 1.7–7)
NEUTROPHILS NFR BLD AUTO: 70 % (ref 42.7–76)
PLATELET # BLD AUTO: 276 10*3/MM3 (ref 140–450)
PMV BLD AUTO: 9.1 FL (ref 6–12)
RBC # BLD AUTO: 3.76 10*6/MM3 (ref 4.14–5.8)
WBC NRBC COR # BLD: 4.46 10*3/MM3 (ref 3.4–10.8)

## 2023-08-02 PROCEDURE — 85025 COMPLETE CBC W/AUTO DIFF WBC: CPT | Performed by: NURSE PRACTITIONER

## 2023-08-02 PROCEDURE — 36592 COLLECT BLOOD FROM PICC: CPT

## 2023-08-02 RX ORDER — SODIUM CHLORIDE 0.9 % (FLUSH) 0.9 %
20 SYRINGE (ML) INJECTION AS NEEDED
Status: DISCONTINUED | OUTPATIENT
Start: 2023-08-02 | End: 2023-08-03 | Stop reason: HOSPADM

## 2023-08-02 RX ORDER — SODIUM CHLORIDE 0.9 % (FLUSH) 0.9 %
20 SYRINGE (ML) INJECTION AS NEEDED
OUTPATIENT
Start: 2023-08-02

## 2023-08-02 RX ADMIN — Medication 20 ML: at 10:33

## 2023-11-09 NOTE — PROGRESS NOTES
Case Management Discharge Planning    Admission Date: 10/26/2023  GMLOS: 4.2  ALOS: 14    6-Clicks ADL Score: 14  6-Clicks Mobility Score: 11  PT and/or OT Eval ordered: Yes  Post-acute Referrals Ordered: Yes  Post-acute Choice Obtained: Yes  Has referral(s) been sent to post-acute provider:  Yes      Anticipated Discharge Dispo: Discharge Disposition: D/T to SNF with Medicare cert in anticipation of skilled care (03)    DME Needed: No    Action(s) Taken:     Pt's case discussed during IDT rounds. Per MD, pt not medically cleared for SNF transfer today.     Spoke with Marla at [facility] to notify that pt may be cleared tomorrow. Marla states they will have a bed available    Spoke with pt and pt's sister, Lazara at bedside. Lazara had questions about hospice services. Discussed Hospice in detail and discussed options for hospice @ at home, SNF or . Lazara states goal is to keep pt in his home. She will look into [facility] if pt is ready for it once discharged from SNF.     Escalations Completed: None    Medically Clear: No    Next Steps: D/C to SNF when cleared    Barriers to Discharge: Medical clearance    Is the patient up for discharge tomorrow: No        Pt to the clinic for IV fluids and labs.  PICC flushed with good blood return noted. 10cc of blood wasted prior to specimen collection. Blood specimen obtained and sent to lab for processing per protocol.  Pt reports to feeling very tired.  Discussed with Dr. Jacob.  Orders given to change CMP to STAT and give 500mL NS over 1 hour.

## (undated) DEVICE — Device

## (undated) DEVICE — TOWEL,OR,DSP,ST,WHITE,DLX,4/PK,20PK/CS: Brand: MEDLINE

## (undated) DEVICE — ADHS LIQ MASTISOL 2/3ML

## (undated) DEVICE — COVER,MAYO STAND,STERILE: Brand: MEDLINE

## (undated) DEVICE — TOTAL TRAY, DB, 100% SILI FOLEY, 16FR 10: Brand: MEDLINE

## (undated) DEVICE — ROD OS LP SURFIT 2 1/2IN

## (undated) DEVICE — SYR LUERLOK 30CC

## (undated) DEVICE — DRAPE, LAVH, STERILE: Brand: MEDLINE

## (undated) DEVICE — PK ENDO GI 50

## (undated) DEVICE — ANTIBACTERIAL UNDYED BRAIDED (POLYGLACTIN 910), SYNTHETIC ABSORBABLE SUTURE: Brand: COATED VICRYL

## (undated) DEVICE — BITEBLOCK ENDO W/STRAP 60F A/ LF DISP

## (undated) DEVICE — TUBING, SUCTION, 1/4" X 12', STRAIGHT: Brand: MEDLINE

## (undated) DEVICE — DRAPE SHEET ULTRAGARD: Brand: MEDLINE

## (undated) DEVICE — SLV SCD CALF HEMOFORCE DVT THERP REPROC MD

## (undated) DEVICE — TP SXN YANKR BULB STRL

## (undated) DEVICE — DRN WND EVAC BULB 100CC

## (undated) DEVICE — 450 ML BOTTLE OF 0.05% CHLORHEXIDINE GLUCONATE IN 99.95% STERILE WATER FOR IRRIGATION, USP AND APPLICATOR.: Brand: IRRISEPT ANTIMICROBIAL WOUND LAVAGE

## (undated) DEVICE — CVR HNDL LT CAM LB54

## (undated) DEVICE — KT SURG TURNOVER 050

## (undated) DEVICE — ELECTRD BLD EZ CLN MOD 6.5IN

## (undated) DEVICE — CVR HNDL LT SURG ACCSSRY BLU STRL

## (undated) DEVICE — PENCL HND ROCKRSWTCH HOLSTR EZ CLEAN TP CRD 10FT

## (undated) DEVICE — GOWN,REINFORCE,POLY,SIRUS,BREATH SLV,XLG: Brand: MEDLINE

## (undated) DEVICE — BLAKE SILICONE DRAIN, 15 FR ROUND, HUBLESS: Brand: BLAKE

## (undated) DEVICE — HEMOST ABS SURGICEL PWDR 3GM: Type: IMPLANTABLE DEVICE | Site: ABDOMEN | Status: NON-FUNCTIONAL

## (undated) DEVICE — ENSEAL 20 CM SHAFT, LARGE JAW: Brand: ENSEAL X1

## (undated) DEVICE — ABDOMINAL BINDER: Brand: DEROYAL

## (undated) DEVICE — SOLUTION,WATER,IRRIGATION,1000ML,STERILE: Brand: MEDLINE

## (undated) DEVICE — FRCP BIOP COLD ENDOJAW ALLGTR CUP 2MM/CH 155CM

## (undated) DEVICE — PK MAJ LAPAROTOMY 50